# Patient Record
Sex: FEMALE | Race: WHITE | NOT HISPANIC OR LATINO | Employment: OTHER | URBAN - METROPOLITAN AREA
[De-identification: names, ages, dates, MRNs, and addresses within clinical notes are randomized per-mention and may not be internally consistent; named-entity substitution may affect disease eponyms.]

---

## 2017-01-24 ENCOUNTER — OFFICE VISIT (OUTPATIENT)
Dept: CARDIOLOGY | Facility: CLINIC | Age: 82
End: 2017-01-24

## 2017-01-24 VITALS
DIASTOLIC BLOOD PRESSURE: 72 MMHG | WEIGHT: 130 LBS | HEIGHT: 64 IN | HEART RATE: 75 BPM | SYSTOLIC BLOOD PRESSURE: 127 MMHG | BODY MASS INDEX: 22.2 KG/M2

## 2017-01-24 DIAGNOSIS — E78.2 MIXED HYPERLIPIDEMIA: ICD-10-CM

## 2017-01-24 DIAGNOSIS — I42.9 IDIOPATHIC CARDIOMYOPATHY (HCC): Primary | ICD-10-CM

## 2017-01-24 DIAGNOSIS — I10 ESSENTIAL HYPERTENSION: ICD-10-CM

## 2017-01-24 PROCEDURE — 99212 OFFICE O/P EST SF 10 MIN: CPT | Performed by: INTERNAL MEDICINE

## 2017-01-24 RX ORDER — ACETAMINOPHEN 325 MG/1
650 TABLET ORAL EVERY 6 HOURS PRN
COMMUNITY

## 2017-01-24 RX ORDER — POTASSIUM CHLORIDE 20 MEQ/1
20 TABLET, EXTENDED RELEASE ORAL DAILY
COMMUNITY
End: 2019-01-01 | Stop reason: HOSPADM

## 2017-01-24 RX ORDER — CARVEDILOL 12.5 MG/1
12.5 TABLET ORAL 2 TIMES DAILY WITH MEALS
COMMUNITY

## 2017-01-24 RX ORDER — BISACODYL 10 MG
10 SUPPOSITORY, RECTAL RECTAL DAILY PRN
COMMUNITY

## 2017-01-24 RX ORDER — DONEPEZIL HYDROCHLORIDE 5 MG/1
5 TABLET, FILM COATED ORAL NIGHTLY
COMMUNITY

## 2017-01-24 RX ORDER — FERROUS SULFATE 325(65) MG
325 TABLET ORAL
COMMUNITY

## 2017-01-24 RX ORDER — MIRTAZAPINE 15 MG/1
7.5 TABLET, FILM COATED ORAL NIGHTLY
Status: ON HOLD | COMMUNITY
End: 2019-01-01

## 2017-01-24 RX ORDER — LEVOTHYROXINE SODIUM 0.1 MG/1
50 TABLET ORAL DAILY
Status: ON HOLD | COMMUNITY
End: 2019-01-01

## 2017-01-24 RX ORDER — FUROSEMIDE 40 MG/1
40 TABLET ORAL DAILY
Status: ON HOLD | COMMUNITY
End: 2019-01-01 | Stop reason: SDUPTHER

## 2017-01-24 RX ORDER — FAMOTIDINE 20 MG/1
20 TABLET, FILM COATED ORAL 2 TIMES DAILY
Status: ON HOLD | COMMUNITY
End: 2018-10-01 | Stop reason: ALTCHOICE

## 2017-01-24 RX ORDER — TRAMADOL HYDROCHLORIDE 50 MG/1
50 TABLET ORAL NIGHTLY
COMMUNITY
End: 2019-01-01 | Stop reason: HOSPADM

## 2017-01-24 RX ORDER — ASCORBIC ACID 500 MG
500 TABLET ORAL DAILY
Status: ON HOLD | COMMUNITY
End: 2018-10-01 | Stop reason: ALTCHOICE

## 2017-01-24 RX ORDER — FOLIC ACID 1 MG/1
1 TABLET ORAL DAILY
COMMUNITY

## 2017-01-24 RX ORDER — SERTRALINE HYDROCHLORIDE 25 MG/1
50 TABLET, FILM COATED ORAL DAILY
Status: ON HOLD | COMMUNITY
End: 2019-01-01

## 2017-01-24 RX ORDER — BENZONATATE 100 MG/1
100 CAPSULE ORAL 3 TIMES DAILY PRN
Status: ON HOLD | COMMUNITY
End: 2018-10-01 | Stop reason: ALTCHOICE

## 2017-01-24 RX ORDER — LISINOPRIL 2.5 MG/1
2.5 TABLET ORAL DAILY
COMMUNITY

## 2017-01-24 RX ORDER — VITAMIN E 268 MG
400 CAPSULE ORAL DAILY
Status: ON HOLD | COMMUNITY
End: 2018-10-01 | Stop reason: ALTCHOICE

## 2017-01-24 RX ORDER — ACETAMINOPHEN 500 MG
500 TABLET ORAL EVERY 6 HOURS PRN
COMMUNITY
End: 2019-01-01 | Stop reason: HOSPADM

## 2017-01-24 NOTE — PROGRESS NOTES
OFFICE FOLLOW UP     Date of Encounter:2017     Name: Imelda Maldonado  : 1923  Address: [address] [place] [address]  Phone: [phone]    PCP: Leyla Castro, DO  [address] [facility] [address]  [facility] [address]    Imelda Maldonado is a 89 y.o. female.      Chief Complaint: cardiomyopathy, dyslipidemia, and hypertension.  PROBLEM LIST:  1. 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   a. Left bundle branch block.  b. History of nonsustained ventricular ectopy.  c. “Normal” coronary arteries by catheterization, 2003, echo ejection fraction of 30%, 2003.  d. Echocardiogram, 2004:  Global hypokinesis, ejection fraction 25%, moderate mitral regurgitation.  e. [employer] BI-V pacemaker implantation by Dr. Naidu on 2010 also with a coronary sinus lead placement.    f. Patient refuses upgrade to an ICD and Coumadin, 2010.    g. Atrial fibrillation with “failed” cardioversion (early recurrence), 2010.  h. Biventricular pacemaker generator change, 10/30/2015.    2. Acute gastrointestinal bleed with hospitalization, 2013 - 2013.  a. Status post 7 units of packed red blood cells.  b. Esophagogastroduodenoscopy (no active bleed) and colonoscopy (active bleeding from flat cecal polyps and two ascending colonic polyps) suggestive of non-steroidal anti-inflammatory drug colopathy or mild ischemia.    c. Positive for Clostridium difficile status post 14 days of antibiotics.  d. Discontinuation of aspirin, no anticoagulation.    3. Arthritis.  a. Reinitiation of Celebrex, 2013.  4. Dyslipidemia.  5. Gout.  6. History of histoplasmosis, OS.  7. History of remote DVT, RLE.  8. Status post thyroidectomy, remote.  a. Chronic replacement therapy.  9. History of intermittent mild CRI.  10. Hypertension.  11. Surgical history.  a. Appendectomy.  b. Thyroidectomy.  c. Hysterectomy.  d. Polypectomy.  12. Probable  "cognitive “issues.”      No Known Allergies      Current Outpatient Prescriptions:   •  acetaminophen  325 MG tablet, by mouth Every 6 Hours As Needed   •  acetaminophen  500 MG tablet, by mouth Every 6 Hours As Needed    •  benzonatate 100 MG capsule, by mouth 3  Times a Day As Needed   •  bisacodyl 10 MG suppository,  into the rectum Daily As Needed for   •  carvedilol 12.5 MG tablet, by mouth 2 Times a Day With Meals.  •  Diclofenac 1 % gel gel, Apply topically As Needed.  •  donepezil  5 MG tablet, by mouth Every Night.  •  famotidine  20 MG tablet,  by mouth 2 Times a Day.  •  ferrous sulfate 325  MG tablet, by mouth Daily With Breakfast.  •  folic acid  1 MG tablet, by mouth Daily.  •  furosemide 40 MG tablet,  by mouth Daily.  •  levothyroxine 100 MCG tablet,  by mouth Daily.  •  lisinopril 2.5 MG tablet,  by mouth Daily.  •  Magnesium Hydroxide,  by mouth Daily As Needed.  •  mirtazapine 15 MG tablet, by mouth Every Night.  •  potassium chloride 20 MEQ CR tablet, by mouth Daily.  •  sertraline 25 MG tablet, by mouth Daily.  •  similac liquid protein concentration,  by mouth Daily.  •  tramadol 50 MG tablet, by mouth Every Night.   •  vitamin C 500 MG tablet, by mouth Daily.  •  vitamin E 400 UNIT capsule, by mouth Daily.    History of Present Illness:         Mrs. Maldonado is a 89 year old female presenting today for a follow up. Since her last visit she has been doing well from a cardiac standpoint. The patient is very sedentary. She also has arthritis. She denies any chest pain, palpitations, or shortness of breath.        The following portions of the patient's history were reviewed and updated as appropriate: allergies, current medications and problem list.    HPI: Pertinent positives as listed in the HPI.  All other systems reviewed and negative.      Objective:    Vitals:    01/24/17 1429   BP: 127/72   BP Location: Left arm   Patient Position: Sitting   Pulse: 75   Weight: 130 lb (59 kg)   Height: 64\" " (162.6 cm)       Physical Exam   Constitutional: She is oriented to person, place, and time.   Neck: No JVD present. No thyromegaly present.   Cardiovascular: Intact distal pulses.    Exam reveals no gallop, murmur, or rub.   Pulmonary/Chest:   No wheezes, crackles, or rhonchi.   Musculoskeletal:   No peripheral edema, no clubbing, or cyanosis    Neurological: She is alert and oriented to person, place, and time.   No focal abnormalities in sensation or strength    Skin: Skin is dry.   Vitals reviewed.         Diagnostic Data:    Device Interrogation 01/24/2017  50 NSVT (max= 11 beats)     Procedures      Assessment and Plan:  1. Patient is doing well from a cardiac standpoint. We will not change anything at this time.  2. Continue with current medication therapy and recommendations.  3. Patient should follow up with me as needed; interim device clinic with EP.        Scribed for Les Cardozo MD by Diandra Mijares. 1/24/2017  3:02 PM       ILes MD, personally performed the services described in this documentation as scribed by the above named individual in my presence, and it is both accurate and complete.  1/24/2017  3:52 PM

## 2018-10-01 ENCOUNTER — HOSPITAL ENCOUNTER (INPATIENT)
Facility: HOSPITAL | Age: 83
LOS: 1 days | Discharge: NURSING, REHAB OR LONG-TERM CARE | End: 2018-10-02
Attending: EMERGENCY MEDICINE | Admitting: INTERNAL MEDICINE

## 2018-10-01 ENCOUNTER — APPOINTMENT (OUTPATIENT)
Dept: CARDIOLOGY | Facility: HOSPITAL | Age: 83
End: 2018-10-01
Attending: HOSPITALIST

## 2018-10-01 ENCOUNTER — APPOINTMENT (OUTPATIENT)
Dept: GENERAL RADIOLOGY | Facility: HOSPITAL | Age: 83
End: 2018-10-01

## 2018-10-01 DIAGNOSIS — I50.9 ACUTE ON CHRONIC CONGESTIVE HEART FAILURE, UNSPECIFIED HEART FAILURE TYPE (HCC): ICD-10-CM

## 2018-10-01 DIAGNOSIS — Z66 DNR NO CODE (DO NOT RESUSCITATE): ICD-10-CM

## 2018-10-01 DIAGNOSIS — R79.89 ELEVATED LACTIC ACID LEVEL: ICD-10-CM

## 2018-10-01 DIAGNOSIS — Z87.19 HISTORY OF GI BLEED: ICD-10-CM

## 2018-10-01 DIAGNOSIS — Z86.79 HISTORY OF HYPERTENSION: ICD-10-CM

## 2018-10-01 DIAGNOSIS — F03.90 DEMENTIA WITHOUT BEHAVIORAL DISTURBANCE, UNSPECIFIED DEMENTIA TYPE: ICD-10-CM

## 2018-10-01 DIAGNOSIS — E03.9 HYPOTHYROIDISM, UNSPECIFIED TYPE: ICD-10-CM

## 2018-10-01 DIAGNOSIS — J96.01 ACUTE RESPIRATORY FAILURE WITH HYPOXIA (HCC): Primary | ICD-10-CM

## 2018-10-01 PROBLEM — E87.20 LACTIC ACIDOSIS: Status: ACTIVE | Noted: 2018-10-01

## 2018-10-01 PROBLEM — I50.23 ACUTE ON CHRONIC SYSTOLIC (CONGESTIVE) HEART FAILURE (HCC): Status: ACTIVE | Noted: 2018-10-01

## 2018-10-01 PROBLEM — N18.30 CKD (CHRONIC KIDNEY DISEASE) STAGE 3, GFR 30-59 ML/MIN (HCC): Status: ACTIVE | Noted: 2018-10-01

## 2018-10-01 LAB
ANION GAP SERPL CALCULATED.3IONS-SCNC: 15 MMOL/L (ref 3–11)
APTT PPP: 24.9 SECONDS (ref 24–31)
ARTERIAL PATENCY WRIST A: ABNORMAL
ATMOSPHERIC PRESS: ABNORMAL MMHG
BACTERIA UR QL AUTO: ABNORMAL /HPF
BASE EXCESS BLDA CALC-SCNC: -0.4 MMOL/L (ref 0–2)
BASOPHILS # BLD AUTO: 0.02 10*3/MM3 (ref 0–0.2)
BASOPHILS NFR BLD AUTO: 0.2 % (ref 0–1)
BDY SITE: ABNORMAL
BILIRUB UR QL STRIP: NEGATIVE
BNP SERPL-MCNC: 859 PG/ML (ref 0–100)
BUN BLD-MCNC: 20 MG/DL (ref 9–23)
BUN/CREAT SERPL: 13.1 (ref 7–25)
CALCIUM SPEC-SCNC: 7.3 MG/DL (ref 8.7–10.4)
CHLORIDE SERPL-SCNC: 107 MMOL/L (ref 99–109)
CLARITY UR: CLEAR
CO2 BLDA-SCNC: 25.7 MMOL/L (ref 22–33)
CO2 SERPL-SCNC: 18 MMOL/L (ref 20–31)
COHGB MFR BLD: 1.4 % (ref 0–2)
COLOR UR: YELLOW
CREAT BLD-MCNC: 1.53 MG/DL (ref 0.6–1.3)
D-LACTATE SERPL-SCNC: 1.4 MMOL/L (ref 0.5–2)
D-LACTATE SERPL-SCNC: 7.3 MMOL/L (ref 0.5–2)
DEPRECATED RDW RBC AUTO: 57.2 FL (ref 37–54)
EOSINOPHIL # BLD AUTO: 0.15 10*3/MM3 (ref 0–0.3)
EOSINOPHIL NFR BLD AUTO: 1.4 % (ref 0–3)
ERYTHROCYTE [DISTWIDTH] IN BLOOD BY AUTOMATED COUNT: 15.3 % (ref 11.3–14.5)
GFR SERPL CREATININE-BSD FRML MDRD: 32 ML/MIN/1.73
GLUCOSE BLD-MCNC: 300 MG/DL (ref 70–100)
GLUCOSE UR STRIP-MCNC: NEGATIVE MG/DL
HCO3 BLDA-SCNC: 24.4 MMOL/L (ref 20–26)
HCT VFR BLD AUTO: 36.2 % (ref 34.5–44)
HCT VFR BLD CALC: 32.9 %
HGB BLD-MCNC: 11 G/DL (ref 11.5–15.5)
HGB BLDA-MCNC: 10.7 G/DL (ref 14–18)
HGB UR QL STRIP.AUTO: NEGATIVE
HOLD SPECIMEN: NORMAL
HOROWITZ INDEX BLD+IHG-RTO: 35 %
HYALINE CASTS UR QL AUTO: ABNORMAL /LPF
IMM GRANULOCYTES # BLD: 0.16 10*3/MM3 (ref 0–0.03)
IMM GRANULOCYTES NFR BLD: 1.5 % (ref 0–0.6)
INR PPP: 1.14 (ref 0.91–1.09)
KETONES UR QL STRIP: NEGATIVE
LEUKOCYTE ESTERASE UR QL STRIP.AUTO: ABNORMAL
LYMPHOCYTES # BLD AUTO: 3.38 10*3/MM3 (ref 0.6–4.8)
LYMPHOCYTES NFR BLD AUTO: 30.7 % (ref 24–44)
MCH RBC QN AUTO: 31.4 PG (ref 27–31)
MCHC RBC AUTO-ENTMCNC: 30.4 G/DL (ref 32–36)
MCV RBC AUTO: 103.4 FL (ref 80–99)
METHGB BLD QL: 0.9 % (ref 0–1.5)
MODALITY: ABNORMAL
MONOCYTES # BLD AUTO: 0.51 10*3/MM3 (ref 0–1)
MONOCYTES NFR BLD AUTO: 4.6 % (ref 0–12)
NEUTROPHILS # BLD AUTO: 6.94 10*3/MM3 (ref 1.5–8.3)
NEUTROPHILS NFR BLD AUTO: 63.1 % (ref 41–71)
NITRITE UR QL STRIP: NEGATIVE
OXYHGB MFR BLDV: 95 % (ref 94–99)
PCO2 BLDA: 40.1 MM HG (ref 35–45)
PH BLDA: 7.39 PH UNITS (ref 7.35–7.45)
PH UR STRIP.AUTO: 6 [PH] (ref 5–8)
PLATELET # BLD AUTO: 246 10*3/MM3 (ref 150–450)
PMV BLD AUTO: 9.8 FL (ref 6–12)
PO2 BLDA: 89.7 MM HG (ref 83–108)
POTASSIUM BLD-SCNC: 4.6 MMOL/L (ref 3.5–5.5)
PROCALCITONIN SERPL-MCNC: 0.16 NG/ML
PROT UR QL STRIP: ABNORMAL
PROTHROMBIN TIME: 12 SECONDS (ref 9.6–11.5)
RBC # BLD AUTO: 3.5 10*6/MM3 (ref 3.89–5.14)
RBC # UR: ABNORMAL /HPF
REF LAB TEST METHOD: ABNORMAL
SODIUM BLD-SCNC: 140 MMOL/L (ref 132–146)
SP GR UR STRIP: 1.01 (ref 1–1.03)
SQUAMOUS #/AREA URNS HPF: ABNORMAL /HPF
TROPONIN I SERPL-MCNC: 0.03 NG/ML (ref 0–0.07)
UROBILINOGEN UR QL STRIP: ABNORMAL
WBC NRBC COR # BLD: 11 10*3/MM3 (ref 3.5–10.8)
WBC UR QL AUTO: ABNORMAL /HPF

## 2018-10-01 PROCEDURE — 93306 TTE W/DOPPLER COMPLETE: CPT

## 2018-10-01 PROCEDURE — 82805 BLOOD GASES W/O2 SATURATION: CPT | Performed by: PHYSICIAN ASSISTANT

## 2018-10-01 PROCEDURE — P9612 CATHETERIZE FOR URINE SPEC: HCPCS

## 2018-10-01 PROCEDURE — 93005 ELECTROCARDIOGRAM TRACING: CPT | Performed by: EMERGENCY MEDICINE

## 2018-10-01 PROCEDURE — 84145 PROCALCITONIN (PCT): CPT | Performed by: EMERGENCY MEDICINE

## 2018-10-01 PROCEDURE — 85025 COMPLETE CBC W/AUTO DIFF WBC: CPT | Performed by: EMERGENCY MEDICINE

## 2018-10-01 PROCEDURE — 25010000002 HEPARIN (PORCINE) PER 1000 UNITS: Performed by: HOSPITALIST

## 2018-10-01 PROCEDURE — 93005 ELECTROCARDIOGRAM TRACING: CPT

## 2018-10-01 PROCEDURE — 83605 ASSAY OF LACTIC ACID: CPT | Performed by: EMERGENCY MEDICINE

## 2018-10-01 PROCEDURE — 25010000002 PIPERACILLIN SOD-TAZOBACTAM PER 1 G: Performed by: PHYSICIAN ASSISTANT

## 2018-10-01 PROCEDURE — 25010000002 FUROSEMIDE PER 20 MG: Performed by: HOSPITALIST

## 2018-10-01 PROCEDURE — 87040 BLOOD CULTURE FOR BACTERIA: CPT | Performed by: EMERGENCY MEDICINE

## 2018-10-01 PROCEDURE — 99223 1ST HOSP IP/OBS HIGH 75: CPT | Performed by: HOSPITALIST

## 2018-10-01 PROCEDURE — 81001 URINALYSIS AUTO W/SCOPE: CPT | Performed by: EMERGENCY MEDICINE

## 2018-10-01 PROCEDURE — 25010000002 VANCOMYCIN 10 G RECONSTITUTED SOLUTION: Performed by: PHYSICIAN ASSISTANT

## 2018-10-01 PROCEDURE — 94660 CPAP INITIATION&MGMT: CPT

## 2018-10-01 PROCEDURE — 36600 WITHDRAWAL OF ARTERIAL BLOOD: CPT | Performed by: PHYSICIAN ASSISTANT

## 2018-10-01 PROCEDURE — 85610 PROTHROMBIN TIME: CPT | Performed by: EMERGENCY MEDICINE

## 2018-10-01 PROCEDURE — 80048 BASIC METABOLIC PNL TOTAL CA: CPT | Performed by: EMERGENCY MEDICINE

## 2018-10-01 PROCEDURE — 99291 CRITICAL CARE FIRST HOUR: CPT

## 2018-10-01 PROCEDURE — 71045 X-RAY EXAM CHEST 1 VIEW: CPT

## 2018-10-01 PROCEDURE — 94799 UNLISTED PULMONARY SVC/PX: CPT

## 2018-10-01 PROCEDURE — 84484 ASSAY OF TROPONIN QUANT: CPT

## 2018-10-01 PROCEDURE — 85730 THROMBOPLASTIN TIME PARTIAL: CPT | Performed by: EMERGENCY MEDICINE

## 2018-10-01 PROCEDURE — 83880 ASSAY OF NATRIURETIC PEPTIDE: CPT | Performed by: EMERGENCY MEDICINE

## 2018-10-01 PROCEDURE — 93306 TTE W/DOPPLER COMPLETE: CPT | Performed by: INTERNAL MEDICINE

## 2018-10-01 PROCEDURE — 5A09357 ASSISTANCE WITH RESPIRATORY VENTILATION, LESS THAN 24 CONSECUTIVE HOURS, CONTINUOUS POSITIVE AIRWAY PRESSURE: ICD-10-PCS | Performed by: EMERGENCY MEDICINE

## 2018-10-01 RX ORDER — LEVOTHYROXINE SODIUM 0.1 MG/1
100 TABLET ORAL DAILY
Status: DISCONTINUED | OUTPATIENT
Start: 2018-10-01 | End: 2018-10-01

## 2018-10-01 RX ORDER — SODIUM CHLORIDE 0.9 % (FLUSH) 0.9 %
1-10 SYRINGE (ML) INJECTION AS NEEDED
Status: DISCONTINUED | OUTPATIENT
Start: 2018-10-01 | End: 2018-10-02 | Stop reason: HOSPADM

## 2018-10-01 RX ORDER — DONEPEZIL HYDROCHLORIDE 5 MG/1
5 TABLET, FILM COATED ORAL NIGHTLY
Status: DISCONTINUED | OUTPATIENT
Start: 2018-10-01 | End: 2018-10-02 | Stop reason: HOSPADM

## 2018-10-01 RX ORDER — SODIUM CHLORIDE 0.9 % (FLUSH) 0.9 %
10 SYRINGE (ML) INJECTION AS NEEDED
Status: DISCONTINUED | OUTPATIENT
Start: 2018-10-01 | End: 2018-10-02 | Stop reason: HOSPADM

## 2018-10-01 RX ORDER — TRAMADOL HYDROCHLORIDE 50 MG/1
50 TABLET ORAL NIGHTLY
Status: DISCONTINUED | OUTPATIENT
Start: 2018-10-01 | End: 2018-10-02 | Stop reason: HOSPADM

## 2018-10-01 RX ORDER — SERTRALINE HYDROCHLORIDE 25 MG/1
25 TABLET, FILM COATED ORAL DAILY
Status: DISCONTINUED | OUTPATIENT
Start: 2018-10-01 | End: 2018-10-02 | Stop reason: HOSPADM

## 2018-10-01 RX ORDER — MIRTAZAPINE 15 MG/1
15 TABLET, FILM COATED ORAL NIGHTLY
Status: DISCONTINUED | OUTPATIENT
Start: 2018-10-01 | End: 2018-10-02 | Stop reason: HOSPADM

## 2018-10-01 RX ORDER — FAMOTIDINE 20 MG/1
20 TABLET, FILM COATED ORAL 2 TIMES DAILY
Status: DISCONTINUED | OUTPATIENT
Start: 2018-10-01 | End: 2018-10-01

## 2018-10-01 RX ORDER — FUROSEMIDE 10 MG/ML
40 INJECTION INTRAMUSCULAR; INTRAVENOUS ONCE
Status: COMPLETED | OUTPATIENT
Start: 2018-10-01 | End: 2018-10-01

## 2018-10-01 RX ORDER — CARVEDILOL 12.5 MG/1
12.5 TABLET ORAL 2 TIMES DAILY WITH MEALS
Status: DISCONTINUED | OUTPATIENT
Start: 2018-10-01 | End: 2018-10-02 | Stop reason: HOSPADM

## 2018-10-01 RX ORDER — FAMOTIDINE 20 MG/1
20 TABLET, FILM COATED ORAL DAILY
Status: DISCONTINUED | OUTPATIENT
Start: 2018-10-01 | End: 2018-10-02 | Stop reason: HOSPADM

## 2018-10-01 RX ORDER — LEVOTHYROXINE SODIUM 0.05 MG/1
50 TABLET ORAL DAILY
Status: DISCONTINUED | OUTPATIENT
Start: 2018-10-01 | End: 2018-10-02 | Stop reason: HOSPADM

## 2018-10-01 RX ORDER — ONDANSETRON 2 MG/ML
4 INJECTION INTRAMUSCULAR; INTRAVENOUS EVERY 6 HOURS PRN
Status: DISCONTINUED | OUTPATIENT
Start: 2018-10-01 | End: 2018-10-02 | Stop reason: HOSPADM

## 2018-10-01 RX ORDER — QUETIAPINE FUMARATE 25 MG/1
12.5 TABLET, FILM COATED ORAL ONCE
Status: COMPLETED | OUTPATIENT
Start: 2018-10-01 | End: 2018-10-01

## 2018-10-01 RX ORDER — ACETAMINOPHEN 325 MG/1
650 TABLET ORAL EVERY 4 HOURS PRN
Status: DISCONTINUED | OUTPATIENT
Start: 2018-10-01 | End: 2018-10-02 | Stop reason: HOSPADM

## 2018-10-01 RX ORDER — HEPARIN SODIUM 5000 [USP'U]/ML
5000 INJECTION, SOLUTION INTRAVENOUS; SUBCUTANEOUS EVERY 12 HOURS SCHEDULED
Status: DISCONTINUED | OUTPATIENT
Start: 2018-10-01 | End: 2018-10-02 | Stop reason: HOSPADM

## 2018-10-01 RX ORDER — FERROUS SULFATE 325(65) MG
325 TABLET ORAL
Status: DISCONTINUED | OUTPATIENT
Start: 2018-10-01 | End: 2018-10-02 | Stop reason: HOSPADM

## 2018-10-01 RX ORDER — ONDANSETRON 4 MG/1
4 TABLET, FILM COATED ORAL EVERY 6 HOURS PRN
Status: DISCONTINUED | OUTPATIENT
Start: 2018-10-01 | End: 2018-10-02 | Stop reason: HOSPADM

## 2018-10-01 RX ORDER — FOLIC ACID 1 MG/1
1 TABLET ORAL DAILY
Status: DISCONTINUED | OUTPATIENT
Start: 2018-10-01 | End: 2018-10-02 | Stop reason: HOSPADM

## 2018-10-01 RX ORDER — VITAMIN E 268 MG
400 CAPSULE ORAL DAILY
Status: DISCONTINUED | OUTPATIENT
Start: 2018-10-01 | End: 2018-10-02 | Stop reason: HOSPADM

## 2018-10-01 RX ORDER — ASCORBIC ACID 500 MG
500 TABLET ORAL DAILY
Status: DISCONTINUED | OUTPATIENT
Start: 2018-10-01 | End: 2018-10-02 | Stop reason: HOSPADM

## 2018-10-01 RX ADMIN — LEVOTHYROXINE SODIUM 50 MCG: 50 TABLET ORAL at 05:55

## 2018-10-01 RX ADMIN — ACETAMINOPHEN 650 MG: 325 TABLET ORAL at 18:23

## 2018-10-01 RX ADMIN — TRAMADOL HYDROCHLORIDE 50 MG: 50 TABLET, FILM COATED ORAL at 20:08

## 2018-10-01 RX ADMIN — VANCOMYCIN HYDROCHLORIDE 1250 MG: 10 INJECTION, POWDER, LYOPHILIZED, FOR SOLUTION INTRAVENOUS at 03:24

## 2018-10-01 RX ADMIN — QUETIAPINE FUMARATE 12.5 MG: 25 TABLET ORAL at 20:09

## 2018-10-01 RX ADMIN — DONEPEZIL HYDROCHLORIDE 5 MG: 5 TABLET ORAL at 20:08

## 2018-10-01 RX ADMIN — VITAMIN E CAP 400 UNIT 400 UNITS: 400 CAP at 09:34

## 2018-10-01 RX ADMIN — CARVEDILOL 12.5 MG: 12.5 TABLET, FILM COATED ORAL at 09:35

## 2018-10-01 RX ADMIN — TAZOBACTAM SODIUM AND PIPERACILLIN SODIUM 3.38 G: 375; 3 INJECTION, SOLUTION INTRAVENOUS at 02:45

## 2018-10-01 RX ADMIN — FOLIC ACID 1 MG: 1 TABLET ORAL at 09:35

## 2018-10-01 RX ADMIN — HEPARIN SODIUM 5000 UNITS: 5000 INJECTION, SOLUTION INTRAVENOUS; SUBCUTANEOUS at 09:34

## 2018-10-01 RX ADMIN — CARVEDILOL 12.5 MG: 12.5 TABLET, FILM COATED ORAL at 17:26

## 2018-10-01 RX ADMIN — MIRTAZAPINE 15 MG: 15 TABLET, FILM COATED ORAL at 20:09

## 2018-10-01 RX ADMIN — FUROSEMIDE 40 MG: 10 INJECTION, SOLUTION INTRAMUSCULAR; INTRAVENOUS at 09:34

## 2018-10-01 RX ADMIN — FAMOTIDINE 20 MG: 20 TABLET, FILM COATED ORAL at 09:34

## 2018-10-01 RX ADMIN — OXYCODONE HYDROCHLORIDE AND ACETAMINOPHEN 500 MG: 500 TABLET ORAL at 09:35

## 2018-10-01 RX ADMIN — HEPARIN SODIUM 5000 UNITS: 5000 INJECTION, SOLUTION INTRAVENOUS; SUBCUTANEOUS at 20:08

## 2018-10-01 RX ADMIN — Medication 325 MG: at 09:35

## 2018-10-01 RX ADMIN — SERTRALINE HYDROCHLORIDE 25 MG: 25 TABLET ORAL at 09:35

## 2018-10-01 NOTE — ED PROVIDER NOTES
Subjective   History is limited by respiratory distress.  EMS reports they were called out for shortness of breath that began approx 20 min before they were called.  Pt was reportedly given NTG without improvement and then EMS was called. They found her with RA sats in the 60s.  They initiated CPAP, gave a neb, as well as NTG and Lasix.  Pt had some improvement but only to the 80s.    Pt is unable to give me history secondary to resp distress.  DNR/DNI forms are present in her paperwork.  Records indicate a history of CHF, CKD, HTN, DVT.        History provided by:  EMS personnel  History limited by:  Acuity of condition and severe respiratory distress   used: No        Review of Systems   Unable to perform ROS: Severe respiratory distress       Past Medical History:   Diagnosis Date   • Acute GI bleeding     12/09/2013 - 12/24/2013. Status post 7 units of packed red blood cells. Esophagogastroduodenoscopy (no active bleed) and colonoscopy (active bleeding from flat cecal polyps and two ascending colonic polyps) suggestive of non-steroidal anti-inflammatory drug colopathy or mild ischemia. Positive for Clostridium difficile status post 14 days of antibiotics.     • Arthritis     reinitiation of celebrex, October 2013   • CRI (chronic renal insufficiency)     mild   • DVT (deep venous thrombosis) (CMS/HCC)     RLE   • Gout    • Histoplasmosis    • Hyperlipidemia    • Hypertension    • Hypotension     symptoms of dizziness   • Idiopathic cardiomyopathy (CMS/HCC)        No Known Allergies    Past Surgical History:   Procedure Laterality Date   • APPENDECTOMY     • HYSTERECTOMY     • PACEMAKER IMPLANTATION      change out on 10/30/15 without complication   • POLYPECTOMY     • THYROIDECTOMY      chronic replacement therapy       Family History   Problem Relation Age of Onset   • Arthritis Other    • Diabetes Other    • Heart disease Other    • Hypertension Other    • Thyroid disease Other         Social History     Social History   • Marital status:      Social History Main Topics   • Smoking status: Former Smoker     Types: Cigarettes   • Smokeless tobacco: Never Used   • Drug use: No   • Sexual activity: Defer     Other Topics Concern   • Not on file           Objective   Physical Exam   Constitutional: She appears well-developed and well-nourished.   HENT:   Head: Normocephalic and atraumatic.   Eyes: Conjunctivae are normal. No scleral icterus.   Neck: Neck supple.   Cardiovascular: Normal rate, regular rhythm and normal heart sounds.    Pulmonary/Chest: She is in respiratory distress. She has rales (coarse bilateral).   Abdominal: Soft. She exhibits no distension. There is no tenderness.   Musculoskeletal: She exhibits no edema.   Neurological: She exhibits normal muscle tone.   Moves all fours, no gross deficits.  No facial droop.  Speaks but mostly to complain about being cold.   Skin: Skin is warm and dry. Capillary refill takes less than 2 seconds. She is not diaphoretic.   Nursing note and vitals reviewed.      Critical Care  Performed by: JHONATHAN SKAGGS  Authorized by: JHONATHAN SKAGGS     Critical care provider statement:     Critical care time (minutes):  35    Critical care time was exclusive of:  Separately billable procedures and treating other patients    Critical care was necessary to treat or prevent imminent or life-threatening deterioration of the following conditions:  Cardiac failure and respiratory failure    Critical care was time spent personally by me on the following activities:  Discussions with consultants, examination of patient, evaluation of patient's response to treatment, obtaining history from patient or surrogate, ordering and performing treatments and interventions, ordering and review of laboratory studies, ordering and review of radiographic studies, pulse oximetry, re-evaluation of patient's condition, review of old charts and ventilator  management    I assumed direction of critical care for this patient from another provider in my specialty: no                 ED Course  ED Course as of Oct 01 0242   Mon Oct 01, 2018   0226 Echo from 2/14 showed EF of 25-30%.  Pt is on Lasix 40 mg daily.  BNP is elevated at 859.  CXR shows CMG with mild to moderate CHF.  She was given Lasix en route per EMS.  I also ordered ABG for further evaluation.  [FC]   0239 UA is negative for acute infectious process.  Paged Dr. Vega, hospitalist to discuss admission.  She is agreeable to admission on telemetry for acute hypoxic respiratory failure and CHF exacerbation.  [FC]      ED Course User Index  [FC] Ivy Robles, VANDANA      Pt in resp distress.  BIPAP initiated urgently, sats improved to 90s and pt reports dyspnea improved.  EKG paced rhythm.    Reviewed old records, history of stage III CHF in addition to CKD.              MDM  Number of Diagnoses or Management Options     Amount and/or Complexity of Data Reviewed  Clinical lab tests: ordered and reviewed  Tests in the radiology section of CPT®: ordered and reviewed  Decide to obtain previous medical records or to obtain history from someone other than the patient: yes  Review and summarize past medical records: yes  Independent visualization of images, tracings, or specimens: yes    Critical Care  Total time providing critical care: 30-74 minutes        Final diagnoses:   Acute respiratory failure with hypoxia (CMS/HCC)   Acute on chronic congestive heart failure, unspecified heart failure type (CMS/HCC)   Elevated lactic acid level   History of hypertension   Hypothyroidism, unspecified type   Dementia without behavioral disturbance, unspecified dementia type   History of GI bleed   DNR no code (do not resuscitate)            Daniele Estrada MD  10/02/18 8214

## 2018-10-01 NOTE — PROGRESS NOTES
Patient seen and examined this morning during rounds.  H&P has been reviewed.  She was admitted overnight with decompensated heart failure after presenting with respiratory distress and hypoxia.  Briefly she is a 89-year-old female with history of chronic systolic heart failure (last EF 25-30%), BiV-ICD, CAD, CKD 3, Afib, DVT, GI bleed, and dementia .This morning she states that she is doing well, pleasantly confused, breathing well    --Acute decompensated systolic heart failure, follow-up echo, continue volume unloading with Lasix as renal function allows.  -- Agree with holding off on abx  --Otherwise continue plan of care as per admission H&P.

## 2018-10-01 NOTE — PLAN OF CARE
Problem: Patient Care Overview  Goal: Plan of Care Review  Outcome: Ongoing (interventions implemented as appropriate)   10/01/18 0628   Coping/Psychosocial   Plan of Care Reviewed With patient   Plan of Care Review   Progress no change   OTHER   Outcome Summary VSS. Will continue to monitor.        Problem: Fall Risk (Adult)  Goal: Identify Related Risk Factors and Signs and Symptoms  Outcome: Ongoing (interventions implemented as appropriate)    Goal: Absence of Fall  Outcome: Ongoing (interventions implemented as appropriate)      Problem: Skin Injury Risk (Adult)  Goal: Identify Related Risk Factors and Signs and Symptoms  Outcome: Ongoing (interventions implemented as appropriate)    Goal: Skin Health and Integrity  Outcome: Ongoing (interventions implemented as appropriate)

## 2018-10-01 NOTE — PROGRESS NOTES
Clinical Nutrition     Nutrition Assessment  Reason for Visit:   Identified at risk by screening criteria, MST score 2+      Patient Name: Imelda Maldonado  YOB: 1923  MRN: [id number]  Date of Encounter: 10/01/18 3:09 PM  Admission date: 10/1/2018      Comments:   Based on reported weight and intake status as well as BMI, will defer pt to RD to follow up per protocol.     Nutrition Assessment   Assessment       Hospital Problem List  Principal Problem:    Acute respiratory failure with hypoxia (CMS/HCC)  Active Problems:    Acute on chronic systolic (congestive) heart failure (CMS/HCC)    Lactic acidosis    CKD (chronic kidney disease) stage 3, GFR 30-59 ml/min (CMS/HCC)      PMH: She  has a past medical history of Acute GI bleeding; Arthritis; CRI (chronic renal insufficiency); DVT (deep venous thrombosis) (CMS/HCC); Gout; Histoplasmosis; Hyperlipidemia; Hypertension; Hypotension; and Idiopathic cardiomyopathy (CMS/HCC).   PSxH: She  has a past surgical history that includes Thyroidectomy; Appendectomy; Hysterectomy; Polypectomy; and Pacemaker Implantation.     Reported/Observed/Food/Nutrition Related History:     Pt seemed confused during time of visit. Pt unsure UBW, thinks she used to weigh 130 lbs unsure when last weighed this amount, reported always trying to watch her weight and how much she consumes to stay attractive. Pt reported never being a big eater, reported appetite to be reduced currently because she is not as active in the hospital as she is when at home, reported moving a lot of furniture around and remodeling at home or playing with her grandchildren. Pt reported no issues with chewing or swallowing at this time, does have a couple missing top front teeth. Pt declined nutritional supplements at this time. No family at bedside during time of visit. Spoke with RN on phone, last weight charted was taken by night shift RN, per tech report this was a standing scale  "weight.    Anthropometrics     Height: 163 cm (64.17\")  Last filed wt: Weight: 43.5 kg (96 lb) (10/01/18 1508)  Weight Method: Standing scale weight per RN report    BMI: BMI (Calculated): 16.4  Underweight:<18.5kg/m2    Ideal Body Weight (IBW) (kg): 55.4    Weight Change   UBW: 130 lbs   Weight change: 34 lbs   % wt loss: 26%   Timeframe: unknown     Last 15 Recorded Weights   View Complete Flowsheet   Weight Weight (kg) Weight (lbs) Weight Method VISIT REPORT   10/1/2018 43.545 kg 96 lb Standing scale per RN report -   10/1/2018 43.772 kg 96 lb 8 oz Standing scale per RN report -   10/1/2018 58.968 kg 130 lb Estimated -   1/24/2017 58.968 kg 130 lb - Report   1/9/2013 63.19 kg 139 lb 4.9 oz -        Labs reviewed       Results from last 7 days  Lab Units 10/01/18  0037   GLUCOSE mg/dL 300*   BUN mg/dL 20   CREATININE mg/dL 1.53*   SODIUM mmol/L 140   CHLORIDE mmol/L 107   POTASSIUM mmol/L 4.6       Lab Results  Lab Value Date/Time   HGBA1C 5.4 10/29/2015 0415   HGBA1C 5.3 02/17/2014 0454       Current Nutrition Prescription     PO: Diet Regular; Cardiac  No active supplement orders      Intake:  Per charting 25% of 1 meal          Nutrition Diagnosis       Problem Predicted suboptimal energy intake   Etiology Clinical condition   Signs/Symptoms PO intake 25% of 1 meal charted, report of minimal intake by pt       Nutrition Intervention   1.  Follow treatment progress, Care plan reviewed, Advise alternate selection, Advised available snacks, Interview for preferences, Menu provided, Encourage intake, Supplement offered/refused        Goal:   General: Nutrition support treatment  PO: Establish PO  Additional goals:      Monitoring/Evaluation:   Per protocol, PO intake, Weight      Will Continue to follow per protocol      Emilia Corneliuss  Time Spent: 20 minutes      "

## 2018-10-01 NOTE — H&P
"    [facility] Medicine Services  HISTORY AND PHYSICAL    Patient Name: Imelda Maldonado  : 1923  MRN: [id number]  Primary Care Physician: Leyla Castro DO  Date of admission: 10/1/2018      Subjective   Subjective     Chief Complaint:  SOA, hypoxia    HPI:  Imelda Maldonado is a 89 y.o. female with hx of chronic systolic CHF (last EF 25-30%), BiV-ICD, CAD, CKD 3, Afib, DVT, GI bleed, and dementia presents from her SNF due to respiratory distress. Patient reportedly had acute onset shortness of air. EMS was called, O2 sats found to be in the 60s. She was given nitro, Lasix, and a neb en route. In the ER, she was placed on BiPAP with improvement. CXR showed signs of CHF. BNP elevated at 859. Lactic acid 7.2. WBC 11, negative procalcitonin. Negative troponin. She was given Vanc and Zosyn. Patient admitted to hospitalists. Currently patient has come off the BiPAP and on 3 liters nasal cannula. She is awake, alert, providing history best she can. \"My memory isn't so good at my age.\" She denies any SOA. Says she occasionally has chest pains but \"it's only when I'm doing something I'm not supposed to be doing.\" No current chest pain. Denies cough, fevers, chills. States she wears oxygen from time to time, \"when my doctor tells me to.\" Wanting to go home.    Review of Systems     Otherwise 10-system ROS reviewed and is negative except as mentioned in the HPI.    Personal History     Past Medical History:   Diagnosis Date   • Acute GI bleeding     2013 - 2013. Status post 7 units of packed red blood cells. Esophagogastroduodenoscopy (no active bleed) and colonoscopy (active bleeding from flat cecal polyps and two ascending colonic polyps) suggestive of non-steroidal anti-inflammatory drug colopathy or mild ischemia. Positive for Clostridium difficile status post 14 days of antibiotics.     • Arthritis     reinitiation of celebrex, 2013   • CRI (chronic renal " insufficiency)     mild   • DVT (deep venous thrombosis) (CMS/HCC)     RLE   • Gout    • Histoplasmosis    • Hyperlipidemia    • Hypertension    • Hypotension     symptoms of dizziness   • Idiopathic cardiomyopathy (CMS/HCC)        Past Surgical History:   Procedure Laterality Date   • APPENDECTOMY     • HYSTERECTOMY     • PACEMAKER IMPLANTATION      change out on 10/30/15 without complication   • POLYPECTOMY     • THYROIDECTOMY      chronic replacement therapy       Family History: family history includes Arthritis in her other; Diabetes in her other; Heart disease in her other; Hypertension in her other; Thyroid disease in her other.     Social History:  reports that she has quit smoking. Her smoking use included Cigarettes. She has never used smokeless tobacco. She reports that she does not use drugs.  Social History     Social History Narrative   • No narrative on file       Medications:  Available home medication information reviewed     No Known Allergies    Objective   Objective     Vital Signs:   Temp:  [97.6 °F (36.4 °C)-100.3 °F (37.9 °C)] 100.3 °F (37.9 °C)  Heart Rate:  [74-77] 75  Resp:  [35-40] 36  BP: (101-161)/(49-82) 137/62  FiO2 (%):  [28 %-35 %] 35 %        Physical Exam   Constitutional: No acute distress, awake, alert  Eyes: PERRLA, sclerae anicteric, no conjunctival injection  HENT: NCAT, mucous membranes moist  Neck: Supple, no thyromegaly, no lymphadenopathy, trachea midline  Respiratory: mild bibasilar rales, nonlabored respirations  Cardiovascular: RRR, no murmurs, rubs, or gallops, palpable pedal pulses bilaterally  Gastrointestinal: Positive bowel sounds, soft, nontender, nondistended  Musculoskeletal: No bilateral ankle edema, no clubbing or cyanosis to extremities  Psychiatric: Appropriate affect, cooperative  Neurologic: Oriented x 2, poor historian and forgetful, strength symmetric in all extremities, Cranial Nerves grossly intact to confrontation, speech clear  Skin: No  zoraida    Results Reviewed:  I have personally reviewed current lab, radiology, and data and agree.      Results from last 7 days  Lab Units 10/01/18  0037   WBC 10*3/mm3 11.00*   HEMOGLOBIN g/dL 11.0*   HEMATOCRIT % 36.2   PLATELETS 10*3/mm3 246   INR  1.14*       Results from last 7 days  Lab Units 10/01/18  0037   SODIUM mmol/L 140   POTASSIUM mmol/L 4.6   CHLORIDE mmol/L 107   CO2 mmol/L 18.0*   BUN mg/dL 20   CREATININE mg/dL 1.53*   GLUCOSE mg/dL 300*   CALCIUM mg/dL 7.3*     Estimated Creatinine Clearance: 20.5 mL/min (A) (by C-G formula based on SCr of 1.53 mg/dL (H)).  Brief Urine Lab Results  (Last result in the past 365 days)      Color   Clarity   Blood   Leuk Est   Nitrite   Protein   CREAT   Urine HCG        10/01/18 0159 Yellow Clear Negative Trace(A) Negative 30 mg/dL (1+)(A)             BNP   Date Value Ref Range Status   10/01/2018 859.0 (H) 0.0 - 100.0 pg/mL Final     Comment:     Results may be falsely decreased if patient taking Biotin.     Imaging Results (last 24 hours)     Procedure Component Value Units Date/Time    XR Chest 1 View [[id number]] Collected:  10/01/18 0028     Updated:  10/01/18 0142    Narrative:       EXAM:    XR Chest, 1 View    EXAM DATE/TIME:    10/1/2018 12:28 AM    CLINICAL HISTORY:    89 years old, female; Signs and symptoms; Dyspnea; SOB, chf.    TECHNIQUE:    Frontal view of the chest.    COMPARISON:    CR XR CHEST 1 VIEW PORTABLE 11/3/2015 5:46 AM    FINDINGS:    Again seen is a left chest wall pacemaker with moderate to severe enlargement   of the cardiac silhouette.  Diffuse aortic atherosclerosis without superior   mediastinal widening.      Diffuse pulmonary vascular prominence with interstitial thickening in both   lungs is new since the prior exam.  Possible trace left pleural effusion.  No   pneumothorax.      Impression:         Findings most consistent with mild to moderate CHF, new since the prior exam.    Enlargement of the cardiac silhouette.    THIS  DOCUMENT HAS BEEN ELECTRONICALLY SIGNED BY MARLINE HAMILTON MD            Assessment/Plan   Assessment / Plan     Hospital Problem List     * (Principal)Acute respiratory failure with hypoxia (CMS/HCC)    Acute on chronic systolic (congestive) heart failure (CMS/HCC)    Lactic acidosis    CKD (chronic kidney disease) stage 3, GFR 30-59 ml/min (CMS/[facility])            Assessment & Plan:  89 yo F with hx of chronic systolic CHF (last EF 25-30%), BiV-ICD, CAD, CKD 3, Afib, DVT, GI bleed, and dementia presents from her SNF due to respiratory distress and hypoxia. Admitted for decompensated heart failure.    PLAN:  --Stable off BiPAP. Continue O2 via NC and wean as tolerated. Nebs and incentive spirometry.  --Give another 40 mg IV Lasix in the morning and then reassess.  --Check Echo.  --I will hold off further ATBx at this time given lack of clinical signs and symptoms of pneumonia, even with her low grade temp. I think this is mostly heart failure. Low threshold to resume ATBx.  --Anticipate d/c back to SNF 1-2 days.    DVT prophylaxis: [facility]    CODE STATUS:    Code Status and Medical Interventions:   Ordered at: 10/01/18 0304     Limited Support to NOT Include:    Intubation    Cardioversion/Defibrillation     Code Status:    No CPR     Medical Interventions (Level of Support Prior to Arrest):    Limited       Admission Status:  I believe this patient meets INPATIENT status due to the need for care which can only be reasonably provided in an hospital setting such as aggressive/expedited ancillary services and/or consultation services, the necessity for IV medications, close physician monitoring and/or the possible need for procedures.  In such, I feel patient’s risk for adverse outcomes and need for care warrant INPATIENT evaluation and predict the patient’s care encounter to likely last beyond 2 midnights.      Electronically signed by Beverly Glasgow MD, 10/01/18, 3:20 AM

## 2018-10-01 NOTE — PLAN OF CARE
Problem: Patient Care Overview  Goal: Plan of Care Review  Outcome: Ongoing (interventions implemented as appropriate)   10/01/18 6447   Coping/Psychosocial   Plan of Care Reviewed With patient   Plan of Care Review   Progress improving   OTHER   Outcome Summary Patient up to chair for a few hours today with assist x2. She isn't able to bear weight d/t knee pain. VSS, rm air. Confusion worsening through the day, starting to pull off telemetry. Will continue to monitor.        Problem: Fall Risk (Adult)  Goal: Identify Related Risk Factors and Signs and Symptoms  Outcome: Outcome(s) achieved Date Met: 10/01/18    Goal: Absence of Fall  Outcome: Ongoing (interventions implemented as appropriate)      Problem: Skin Injury Risk (Adult)  Goal: Identify Related Risk Factors and Signs and Symptoms  Outcome: Outcome(s) achieved Date Met: 10/01/18    Goal: Skin Health and Integrity  Outcome: Ongoing (interventions implemented as appropriate)

## 2018-10-02 VITALS
TEMPERATURE: 97.6 F | DIASTOLIC BLOOD PRESSURE: 75 MMHG | RESPIRATION RATE: 20 BRPM | WEIGHT: 96 LBS | BODY MASS INDEX: 16.39 KG/M2 | OXYGEN SATURATION: 97 % | HEIGHT: 64 IN | HEART RATE: 75 BPM | SYSTOLIC BLOOD PRESSURE: 159 MMHG

## 2018-10-02 PROBLEM — I50.23 ACUTE ON CHRONIC SYSTOLIC (CONGESTIVE) HEART FAILURE (HCC): Status: RESOLVED | Noted: 2018-10-01 | Resolved: 2018-10-02

## 2018-10-02 PROBLEM — J96.01 ACUTE RESPIRATORY FAILURE WITH HYPOXIA (HCC): Status: RESOLVED | Noted: 2018-10-01 | Resolved: 2018-10-02

## 2018-10-02 PROBLEM — E87.20 LACTIC ACIDOSIS: Status: RESOLVED | Noted: 2018-10-01 | Resolved: 2018-10-02

## 2018-10-02 LAB
ANION GAP SERPL CALCULATED.3IONS-SCNC: 8 MMOL/L (ref 3–11)
BNP SERPL-MCNC: 1026 PG/ML (ref 0–100)
BUN BLD-MCNC: 20 MG/DL (ref 9–23)
BUN/CREAT SERPL: 13.7 (ref 7–25)
CALCIUM SPEC-SCNC: 6.9 MG/DL (ref 8.7–10.4)
CHLORIDE SERPL-SCNC: 107 MMOL/L (ref 99–109)
CO2 SERPL-SCNC: 27 MMOL/L (ref 20–31)
CREAT BLD-MCNC: 1.46 MG/DL (ref 0.6–1.3)
DEPRECATED RDW RBC AUTO: 54.2 FL (ref 37–54)
ERYTHROCYTE [DISTWIDTH] IN BLOOD BY AUTOMATED COUNT: 15 % (ref 11.3–14.5)
GFR SERPL CREATININE-BSD FRML MDRD: 33 ML/MIN/1.73
GLUCOSE BLD-MCNC: 81 MG/DL (ref 70–100)
HCT VFR BLD AUTO: 32.5 % (ref 34.5–44)
HGB BLD-MCNC: 10.3 G/DL (ref 11.5–15.5)
MCH RBC QN AUTO: 31.7 PG (ref 27–31)
MCHC RBC AUTO-ENTMCNC: 31.7 G/DL (ref 32–36)
MCV RBC AUTO: 100 FL (ref 80–99)
PLATELET # BLD AUTO: 149 10*3/MM3 (ref 150–450)
PMV BLD AUTO: 9.4 FL (ref 6–12)
POTASSIUM BLD-SCNC: 3.7 MMOL/L (ref 3.5–5.5)
RBC # BLD AUTO: 3.25 10*6/MM3 (ref 3.89–5.14)
SODIUM BLD-SCNC: 142 MMOL/L (ref 132–146)
WBC NRBC COR # BLD: 4.73 10*3/MM3 (ref 3.5–10.8)

## 2018-10-02 PROCEDURE — 85027 COMPLETE CBC AUTOMATED: CPT | Performed by: HOSPITALIST

## 2018-10-02 PROCEDURE — 83880 ASSAY OF NATRIURETIC PEPTIDE: CPT | Performed by: HOSPITALIST

## 2018-10-02 PROCEDURE — 25010000002 HEPARIN (PORCINE) PER 1000 UNITS: Performed by: HOSPITALIST

## 2018-10-02 PROCEDURE — 99239 HOSP IP/OBS DSCHRG MGMT >30: CPT | Performed by: INTERNAL MEDICINE

## 2018-10-02 PROCEDURE — 80048 BASIC METABOLIC PNL TOTAL CA: CPT | Performed by: HOSPITALIST

## 2018-10-02 RX ADMIN — Medication 325 MG: at 09:25

## 2018-10-02 RX ADMIN — SERTRALINE HYDROCHLORIDE 25 MG: 25 TABLET ORAL at 09:24

## 2018-10-02 RX ADMIN — FAMOTIDINE 20 MG: 20 TABLET, FILM COATED ORAL at 09:25

## 2018-10-02 RX ADMIN — HEPARIN SODIUM 5000 UNITS: 5000 INJECTION, SOLUTION INTRAVENOUS; SUBCUTANEOUS at 09:23

## 2018-10-02 RX ADMIN — ACETAMINOPHEN 650 MG: 325 TABLET ORAL at 09:23

## 2018-10-02 RX ADMIN — VITAMIN E CAP 400 UNIT 400 UNITS: 400 CAP at 09:25

## 2018-10-02 RX ADMIN — LEVOTHYROXINE SODIUM 50 MCG: 50 TABLET ORAL at 06:36

## 2018-10-02 RX ADMIN — FOLIC ACID 1 MG: 1 TABLET ORAL at 09:25

## 2018-10-02 RX ADMIN — CARVEDILOL 12.5 MG: 12.5 TABLET, FILM COATED ORAL at 09:25

## 2018-10-02 RX ADMIN — OXYCODONE HYDROCHLORIDE AND ACETAMINOPHEN 500 MG: 500 TABLET ORAL at 09:24

## 2018-10-02 NOTE — PROGRESS NOTES
Case Management Discharge Note    Final Note: Back to [facility] medicaid bed at [facility] via EMS at Noon. PCS form completed and placed on chartlet. Nurse to call report to [phone]. Liaison to obtain transfer summary from [facility]. Nurse to send hard Rx's with patient for any controlled substance ordered on d/c summary. Daughter is aware of transport back to facility.     Destination - Selection Complete     Service Request Status Selected Specialties Address Phone Number Fax Number    [facility] Selected Intermediate Care Facility [address] [place] [address] [phone]      Durable Medical Equipment     No service has been selected for the patient.      Dialysis/Infusion     No service has been selected for the patient.      Home Medical Care     No service has been selected for the patient.      Social Care     No service has been selected for the patient.             Final Discharge Disposition Code: 04 - intermediate care facility

## 2018-10-02 NOTE — PLAN OF CARE
Problem: Patient Care Overview  Goal: Plan of Care Review  Outcome: Ongoing (interventions implemented as appropriate)   10/02/18 0516   Coping/Psychosocial   Plan of Care Reviewed With patient   Plan of Care Review   Progress no change   OTHER   Outcome Summary VSS. pt rested well overnight. plan to go back to assisted living today. Will contiue to monitor.        Problem: Fall Risk (Adult)  Goal: Absence of Fall  Outcome: Ongoing (interventions implemented as appropriate)      Problem: Skin Injury Risk (Adult)  Goal: Skin Health and Integrity  Outcome: Ongoing (interventions implemented as appropriate)

## 2018-10-02 NOTE — PROGRESS NOTES
Case Management Discharge Note    Final Note: Back to LT medicaid bed at [facility] via EMS at Noon. PCS form completed and placed on chartlet. Nurse to call report to [phone]. Liaison to obtain transfer summary from [facility]. Nurse to send hard Rx's with patient for any controlled substance ordered on d/c summary. Daughter is aware of transport back to facility.     Destination - Selection Complete     Service Request Status Selected Specialties Address Phone Number Fax Number    [facility] Selected Intermediate Care Facility [address] [place] [address] [phone]      Durable Medical Equipment     No service has been selected for the patient.      Dialysis/Infusion     No service has been selected for the patient.      Home Medical Care     No service has been selected for the patient.      Social Care     No service has been selected for the patient.        Ambulance: [facility]    Final Discharge Disposition Code: 04 - intermediate care facility

## 2018-10-02 NOTE — DISCHARGE SUMMARY
[facility] Medicine Services  DISCHARGE SUMMARY    Patient Name: Imelda Maldonado  : 1923  MRN: [id number]    Date of Admission: 10/1/2018  Date of Discharge:  10/2/2018  Primary Care Physician: Leyla Castro DO    Consults     No orders found for last 30 day(s).        Hospital Course     Presenting Problem:   Acute respiratory failure with hypoxia (CMS/HCC) [J96.01]  Acute respiratory failure with hypoxia (CMS/HCC) [J96.01]    Active Hospital Problems    Diagnosis Date Noted   • CKD (chronic kidney disease) stage 3, GFR 30-59 ml/min (CMS/HCC) [N18.3] 10/01/2018      Resolved Hospital Problems    Diagnosis Date Noted Date Resolved   • **Acute respiratory failure with hypoxia (CMS/HCC) [J96.01] 10/01/2018 10/02/2018   • Acute on chronic systolic (congestive) heart failure (CMS/[facility]) [I50.23] 10/01/2018 10/02/2018   • Lactic acidosis [E87.2] 10/01/2018 10/02/2018        Hospital Course:  Imelda Maldonado is a 89 y.o. female  with history of chronic systolic heart failure (last EF 25-30%), BiV-ICD, CAD, CKD 3, Afib, DVT, GI bleed, and dementia.  She presented with respiratory distress and hypoxia, and was admitted with decompensated systolic heart failure.  She was placed on BiPAP, diuresed with Lasix and given to her nebs with good response.  Echo was done final read was pending at the time of d/c.She was initially on 2 L of oxygen per nasal cannula, however, after the above she's now on room air and doing well.  Patient was slightly confused but not too far off her baseline.  She was assisting stable and ready for discharge back SNF    Discharge Follow Up Recommendations for labs/diagnostics:  F/u with PCP in 1 week    Day of Discharge     HPI: No acute events overnight, patient is awake and alert, confused.    Review of Systems  Gen- No fevers, chills  CV- No chest pain, palpitations  Resp- No cough, dyspnea  GI- No N/V/D, abd pain    Otherwise ROS is negative except as mentioned  in the HPI.    Vital Signs:   Temp:  [97.4 °F (36.3 °C)-98 °F (36.7 °C)] 97.6 °F (36.4 °C)  Heart Rate:  [69-85] 75  Resp:  [12-20] 20  BP: (139-159)/(64-75) 159/75     Physical Exam:  Constitutional: Thin elderly female in no acute distress, awake, alert, but pleasantly confused  HENT: NCAT, mucous membranes moist  Respiratory: Non labored respirations, no wheezes/rhonchi.   Cardiovascular: RRR, no murmurs, rubs, or gallops, palpable pedal pulses bilaterally  Gastrointestinal: Positive bowel sounds, soft, nontender, nondistended  Musculoskeletal: No bilateral ankle edema  Psychiatric: Appropriate affect, cooperative  Neurologic: confused, no focal deficits  Skin: No rashes    Pertinent  and/or Most Recent Results       Results from last 7 days  Lab Units 10/02/18  0359 10/01/18  0037   WBC 10*3/mm3 4.73 11.00*   HEMOGLOBIN g/dL 10.3* 11.0*   HEMATOCRIT % 32.5* 36.2   PLATELETS 10*3/mm3 149* 246   SODIUM mmol/L 142 140   POTASSIUM mmol/L 3.7 4.6   CHLORIDE mmol/L 107 107   CO2 mmol/L 27.0 18.0*   BUN mg/dL 20 20   CREATININE mg/dL 1.46* 1.53*   GLUCOSE mg/dL 81 300*   CALCIUM mg/dL 6.9* 7.3*       Results from last 7 days  Lab Units 10/01/18  0037   PROTIME Seconds 12.0*   INR  1.14*   APTT seconds 24.9           Invalid input(s): TG, LDLCALC, LDLREALC    Results from last 7 days  Lab Units 10/02/18  0359 10/01/18  0037   BNP pg/mL 1,026.0* 859.0*     Brief Urine Lab Results  (Last result in the past 365 days)      Color   Clarity   Blood   Leuk Est   Nitrite   Protein   CREAT   Urine HCG        10/01/18 0159 Yellow Clear Negative Trace(A) Negative 30 mg/dL (1+)(A)               Microbiology Results Abnormal     Procedure Component Value - Date/Time    Blood Culture - Blood, [[id number]]  (Normal) Collected:  10/01/18 0112    Lab Status:  Preliminary result Specimen:  Blood from Arm, Left Updated:  10/02/18 0145     Blood Culture No growth at 24 hours    Blood Culture - Blood, [[id number]]  (Normal) Collected:   10/01/18 0113    Lab Status:  Preliminary result Specimen:  Blood from Arm, Right Updated:  10/02/18 0145     Blood Culture No growth at 24 hours          Imaging Results (all)     Procedure Component Value Units Date/Time    XR Chest 1 View [[id number]] Collected:  10/01/18 0028     Updated:  10/01/18 0142    Narrative:       EXAM:    XR Chest, 1 View    EXAM DATE/TIME:    10/1/2018 12:28 AM    CLINICAL HISTORY:    89 years old, female; Signs and symptoms; Dyspnea; SOB, chf.    TECHNIQUE:    Frontal view of the chest.    COMPARISON:    CR XR CHEST 1 VIEW PORTABLE 11/3/2015 5:46 AM    FINDINGS:    Again seen is a left chest wall pacemaker with moderate to severe enlargement   of the cardiac silhouette.  Diffuse aortic atherosclerosis without superior   mediastinal widening.      Diffuse pulmonary vascular prominence with interstitial thickening in both   lungs is new since the prior exam.  Possible trace left pleural effusion.  No   pneumothorax.      Impression:         Findings most consistent with mild to moderate CHF, new since the prior exam.    Enlargement of the cardiac silhouette.    THIS DOCUMENT HAS BEEN ELECTRONICALLY SIGNED BY MARLINE HAMILTON MD                    Results for orders placed in visit on 14   CONVERTED (HISTORICAL) ECHO    Narrative Patient:      LOBO RDZ    [facility] Rec#:     [id number]               :          1923            Date:         2014            Age:          89y                   Height:       162.56 cm / 64.0 in  Weight:       61.69 kg / 136.0 lbs  Sex:          F                     BSA:          1.66  Room#:        [facility]                      Sonographer:  Collett, Tamela RDCS, RDMS  Reading:      Les Cardozo MD, FACC, [facility]  ______________________________________________________________________    Transthoracic Echocardiogram    Indication:  CHF  BP:           137/53    Conclusions  1. The study quality is good.   2. The left ventricular chamber size is  normal.  3. Mild concentric left ventricular hypertrophy is observed.  4. There is moderate to severely decreased left ventricular systolic  function.   5. The estimated ejection fraction is 25-30%.   6. The left atrium is mildly dilated.  7. The right ventricle is slightly dilated.   8. There is moderate mitral regurgitation.   9. There is moderate aortic regurgitation.  10. There is mild tricuspid regurgitation with an RSVP=39 torr.  11. There is a small pericardial effusion.    Findings       Technical Comments:  The study quality is good.      Left Ventricle:  The left ventricular chamber size is normal. Mild concentric left  ventricular hypertrophy is observed. There is moderate to severely  decreased left ventricular systolic function.  The estimated ejection  fraction is 25-30%.      Left Atrium:  The left atrium is mildly dilated.     Right Ventricle:  The right ventricle is slightly dilated.  The right ventricular global  systolic function is normal. A pacemaker wire is visualized in the right  ventricle.     Right Atrium:  The right atrium is mildly dilated.  No atrial septal defect is  visualized.     Aortic Valve:  The aortic valve is trileaflet. There is moderate aortic regurgitation.  There is no evidence of aortic stenosis.     Mitral Valve:  The mitral valve leaflets appear normal. There is no evidence of mitral  valve prolapse. There is moderate mitral regurgitation.  There is no  evidence of mitral stenosis.     Tricuspid Valve:  The tricuspid valve leaflets are normal.  There is mild tricuspid  regurgitation.     Pulmonic Valve:  The pulmonic valve appears normal. There is mild pulmonic regurgitation.       Pericardium:  There is a small pericardial effusion.     Aorta:  There is no dilatation of the aortic root.     Pulmonary Artery:  The main pulmonary artery appears normal.     Venous:  The venous system appears normal. The inferior vena cava is dilated.      Measurements   Chambers  Name                     Value           RVIDd (AP) 2D           2.74 cm         IVSd (2D)               1.25 cm         LVIDd (2D)              5.25 cm         LVIDs (2D)              4.75 cm         LVPWd (2D)              1.13 cm         LA dimension (AP) 2D    4.5 cm            Diastolic/Systolic Function  Name                    Value           MV E-wave Vmax          0.94 m/sec        Aortic Valve  Name                    Value           AR PHT                  419 msec          Mitral Valve  Name                    Value           MR Vmax                 5.28 m/sec      MR VTI                  189 cm          MR volume (PISA)        15 ml           MR ERO                  0.08 cm2        MR PISA radius          0.4 cm          MR alias Vmax           39.3 cm/sec       Tricuspid Valve  Name                    Value           TR Vmax                 2.7 m/sec       TR peak gradient        29 mmHg         RAP                     10 mmHg         RVSP                    39 mmHg           Pulmonic Valve/Qp:Qs  Name                    Value           PV Vmax                 0.91 m/sec      PV peak gradient        3 mmHg          NV end-diastolic Vmax   1.32 m/sec        Electronically signed by: Les Cardozo MD, [facility], [facility] on 02/17/2014  19:52:36        Order Current Status    Blood Culture - Blood, Preliminary result    Blood Culture - Blood, Preliminary result        Discharge Details        Discharge Medications      Continue These Medications      Instructions Start Date   acetaminophen 325 MG tablet  Commonly known as:  TYLENOL   650 mg, Oral, Every 6 Hours PRN      acetaminophen 500 MG tablet  Commonly known as:  TYLENOL   500 mg, Oral, Every 6 Hours PRN      BISAC-EVAC 10 MG suppository  Generic drug:  bisacodyl   10 mg, Rectal, Daily PRN      carvedilol 12.5 MG tablet  Commonly known as:  COREG   12.5 mg, Oral, 2 Times Daily With Meals      diclofenac 1 % gel gel  Commonly known as:  VOLTAREN   4 g, Topical, As  Needed      donepezil 5 MG tablet  Commonly known as:  ARICEPT   5 mg, Oral, Nightly      ferrous sulfate 325 (65 FE) MG tablet   325 mg, Oral, Daily With Breakfast      folic acid 1 MG tablet  Commonly known as:  FOLVITE   1 mg, Oral, Daily      furosemide 40 MG tablet  Commonly known as:  LASIX   40 mg, Oral, Daily      levothyroxine 100 MCG tablet  Commonly known as:  SYNTHROID, LEVOTHROID   50 mcg, Oral, Daily      lisinopril 2.5 MG tablet  Commonly known as:  PRINIVIL,ZESTRIL   2.5 mg, Oral, Daily      MILK OF MAGNESIA PO   30 mL, Oral, Daily PRN      mirtazapine 15 MG tablet  Commonly known as:  REMERON   7.5 mg, Oral, Nightly      potassium chloride 20 MEQ CR tablet  Commonly known as:  K-DUR,KLOR-CON   20 mEq, Oral, Daily      sertraline 25 MG tablet  Commonly known as:  ZOLOFT   25 mg, Oral, Daily      traMADol 50 MG tablet  Commonly known as:  ULTRAM   50 mg, Oral, Nightly             Discharge Disposition:SNF      Discharge Diet:  Diet Instructions     Advance Diet As Tolerated             Discharge Activity:   Activity Instructions     Activity as Tolerated           Special Instructions:None    Code Status/Level of Support:  Code Status and Medical Interventions:   Ordered at: 10/01/18 0304     Limited Support to NOT Include:    Intubation    Cardioversion/Defibrillation     Code Status:    No CPR     Medical Interventions (Level of Support Prior to Arrest):    Limited       No future appointments.  Additional Instructions for the Follow-ups that You Need to Schedule     Discharge Follow-up with PCP    As directed      Currently Documented PCP:  Leyla Castro DO  PCP Phone Number:  [phone]    Follow Up Details:  1 week             Time Spent on Discharge:  30 minutes    Electronically signed by Bobby Diaz MD, 10/02/18, 11:13 AM.

## 2018-10-06 LAB
BACTERIA SPEC AEROBE CULT: NORMAL
BACTERIA SPEC AEROBE CULT: NORMAL
BH CV ECHO MEAS - AI DEC SLOPE: 334.7 CM/SEC^2
BH CV ECHO MEAS - AI MAX PG: 90.5 MMHG
BH CV ECHO MEAS - AI MAX VEL: 475.6 CM/SEC
BH CV ECHO MEAS - AI P1/2T: 416.2 MSEC
BH CV ECHO MEAS - AO MAX PG (FULL): 6.3 MMHG
BH CV ECHO MEAS - AO MAX PG: 8 MMHG
BH CV ECHO MEAS - AO MEAN PG (FULL): 3 MMHG
BH CV ECHO MEAS - AO MEAN PG: 3.9 MMHG
BH CV ECHO MEAS - AO ROOT AREA (BSA CORRECTED): 2
BH CV ECHO MEAS - AO ROOT AREA: 6.4 CM^2
BH CV ECHO MEAS - AO ROOT DIAM: 2.9 CM
BH CV ECHO MEAS - AO V2 MAX: 140.7 CM/SEC
BH CV ECHO MEAS - AO V2 MEAN: 90.9 CM/SEC
BH CV ECHO MEAS - AO V2 VTI: 25.3 CM
BH CV ECHO MEAS - AVA(I,A): 1.5 CM^2
BH CV ECHO MEAS - AVA(I,D): 1.5 CM^2
BH CV ECHO MEAS - AVA(V,A): 1.4 CM^2
BH CV ECHO MEAS - AVA(V,D): 1.4 CM^2
BH CV ECHO MEAS - BSA(HAYCOCK): 1.4 M^2
BH CV ECHO MEAS - BSA: 1.4 M^2
BH CV ECHO MEAS - BZI_BMI: 16.5 KILOGRAMS/M^2
BH CV ECHO MEAS - BZI_METRIC_HEIGHT: 162.6 CM
BH CV ECHO MEAS - BZI_METRIC_WEIGHT: 43.5 KG
BH CV ECHO MEAS - EDV(CUBED): 246 ML
BH CV ECHO MEAS - EDV(TEICH): 198.7 ML
BH CV ECHO MEAS - EF(CUBED): 23.8 %
BH CV ECHO MEAS - EF(TEICH): 18.7 %
BH CV ECHO MEAS - ESV(CUBED): 187.5 ML
BH CV ECHO MEAS - ESV(TEICH): 161.6 ML
BH CV ECHO MEAS - FS: 8.7 %
BH CV ECHO MEAS - IVS/LVPW: 0.98
BH CV ECHO MEAS - IVSD: 0.97 CM
BH CV ECHO MEAS - LA DIMENSION: 5 CM
BH CV ECHO MEAS - LA/AO: 1.7
BH CV ECHO MEAS - LAD MAJOR: 7.6 CM
BH CV ECHO MEAS - LAT PEAK E' VEL: 8.4 CM/SEC
BH CV ECHO MEAS - LATERAL E/E' RATIO: 12.4
BH CV ECHO MEAS - LV MASS(C)D: 259.4 GRAMS
BH CV ECHO MEAS - LV MASS(C)DI: 181.1 GRAMS/M^2
BH CV ECHO MEAS - LV MAX PG: 1.7 MMHG
BH CV ECHO MEAS - LV MEAN PG: 0.87 MMHG
BH CV ECHO MEAS - LV V1 MAX: 64.9 CM/SEC
BH CV ECHO MEAS - LV V1 MEAN: 42.7 CM/SEC
BH CV ECHO MEAS - LV V1 VTI: 12.8 CM
BH CV ECHO MEAS - LVIDD: 6.3 CM
BH CV ECHO MEAS - LVIDS: 5.7 CM
BH CV ECHO MEAS - LVOT AREA (M): 3.1 CM^2
BH CV ECHO MEAS - LVOT AREA: 3 CM^2
BH CV ECHO MEAS - LVOT DIAM: 2 CM
BH CV ECHO MEAS - LVPWD: 0.99 CM
BH CV ECHO MEAS - MED PEAK E' VEL: 6.9 CM/SEC
BH CV ECHO MEAS - MEDIAL E/E' RATIO: 15.1
BH CV ECHO MEAS - MR ALIAS VEL: 30.8 CM/SEC
BH CV ECHO MEAS - MR ERO: 0.5 CM^2
BH CV ECHO MEAS - MR FLOW RATE: 287.7 CM^3/SEC
BH CV ECHO MEAS - MR MAX PG: 131.5 MMHG
BH CV ECHO MEAS - MR MAX VEL: 573.4 CM/SEC
BH CV ECHO MEAS - MR MEAN PG: 74.5 MMHG
BH CV ECHO MEAS - MR MEAN VEL: 399.7 CM/SEC
BH CV ECHO MEAS - MR PISA RADIUS: 1.2 CM
BH CV ECHO MEAS - MR PISA: 9.3 CM^2
BH CV ECHO MEAS - MR VOLUME: 92.5 ML
BH CV ECHO MEAS - MR VTI: 184.4 CM
BH CV ECHO MEAS - MV AREA (1 DIAM): 9.5 CM^2
BH CV ECHO MEAS - MV DEC TIME: 0.14 SEC
BH CV ECHO MEAS - MV DIAM: 3.5 CM
BH CV ECHO MEAS - MV E MAX VEL: 106.1 CM/SEC
BH CV ECHO MEAS - MV FLOW AREA(1DIAM): 9.5 CM^2
BH CV ECHO MEAS - MV MAX PG: 5 MMHG
BH CV ECHO MEAS - MV MEAN PG: 2.1 MMHG
BH CV ECHO MEAS - MV V2 MAX: 111.3 CM/SEC
BH CV ECHO MEAS - MV V2 MEAN: 68.9 CM/SEC
BH CV ECHO MEAS - MV V2 VTI: 24.4 CM
BH CV ECHO MEAS - MVA(VTI): 1.6 CM^2
BH CV ECHO MEAS - PI END-D VEL: 102.1 CM/SEC
BH CV ECHO MEAS - RAP SYSTOLE: 8 MMHG
BH CV ECHO MEAS - RF(MV,AO)(1 DIAM): 0.3
BH CV ECHO MEAS - RF(MV,LVOT)(1DIAM): 0.83
BH CV ECHO MEAS - RVSP: 35 MMHG
BH CV ECHO MEAS - SI(AO): 113.2 ML/M^2
BH CV ECHO MEAS - SI(CUBED): 40.9 ML/M^2
BH CV ECHO MEAS - SI(LVOT): 26.8 ML/M^2
BH CV ECHO MEAS - SI(MV 1 DIAM): 161 ML/M^2
BH CV ECHO MEAS - SI(TEICH): 25.9 ML/M^2
BH CV ECHO MEAS - SV(AO): 162.1 ML
BH CV ECHO MEAS - SV(CUBED): 58.5 ML
BH CV ECHO MEAS - SV(LVOT): 38.3 ML
BH CV ECHO MEAS - SV(MV 1 DIAM): 230.5 ML
BH CV ECHO MEAS - SV(TEICH): 37.1 ML
BH CV ECHO MEAS - TR MAX PG: 27 MMHG
BH CV ECHO MEAS - TR MAX VEL: 258.7 CM/SEC
BH CV ECHO MEASUREMENTS AVERAGE E/E' RATIO: 13.87
BH CV VAS BP RIGHT ARM: NORMAL MMHG
BH CV XLRA - RV BASE: 3.1 CM
BH CV XLRA - RV LENGTH: 7.5 CM
BH CV XLRA - RV MID: 1.8 CM
BH CV XLRA - TDI S': 7.2 CM/SEC
LEFT ATRIUM VOLUME INDEX: 90.1 ML/M^2
LEFT ATRIUM VOLUME: 131 CM3
MAXIMAL PREDICTED HEART RATE: 125 BPM
MR PISA EROA: 0.48 CM2
MV REGURGITANT FRACTION: 38 %
PISA ALIASING VEL: 3.1 M/S
PISA RADIUS: 1.2 CM
STRESS TARGET HR: 106 BPM

## 2019-01-01 ENCOUNTER — HOSPITAL ENCOUNTER (INPATIENT)
Facility: HOSPITAL | Age: 84
LOS: 2 days | Discharge: NURSING, REHAB OR LONG-TERM CARE | End: 2019-08-21
Attending: EMERGENCY MEDICINE | Admitting: FAMILY MEDICINE

## 2019-01-01 ENCOUNTER — APPOINTMENT (OUTPATIENT)
Dept: GENERAL RADIOLOGY | Facility: HOSPITAL | Age: 84
End: 2019-01-01

## 2019-01-01 ENCOUNTER — OFFICE VISIT (OUTPATIENT)
Dept: CARDIOLOGY | Facility: CLINIC | Age: 84
End: 2019-01-01

## 2019-01-01 ENCOUNTER — APPOINTMENT (OUTPATIENT)
Dept: CT IMAGING | Facility: HOSPITAL | Age: 84
End: 2019-01-01

## 2019-01-01 ENCOUNTER — HOSPITAL ENCOUNTER (INPATIENT)
Facility: HOSPITAL | Age: 84
LOS: 3 days | Discharge: NURSING, REHAB OR LONG-TERM CARE | End: 2019-09-07
Attending: EMERGENCY MEDICINE | Admitting: HOSPITALIST

## 2019-01-01 ENCOUNTER — APPOINTMENT (OUTPATIENT)
Dept: CARDIOLOGY | Facility: HOSPITAL | Age: 84
End: 2019-01-01

## 2019-01-01 ENCOUNTER — DOCUMENTATION (OUTPATIENT)
Dept: CARDIAC REHAB | Facility: HOSPITAL | Age: 84
End: 2019-01-01

## 2019-01-01 VITALS
BODY MASS INDEX: 16.39 KG/M2 | WEIGHT: 96 LBS | HEART RATE: 72 BPM | HEIGHT: 64 IN | SYSTOLIC BLOOD PRESSURE: 100 MMHG | DIASTOLIC BLOOD PRESSURE: 52 MMHG

## 2019-01-01 VITALS
HEIGHT: 64 IN | RESPIRATION RATE: 18 BRPM | TEMPERATURE: 97.8 F | HEART RATE: 74 BPM | OXYGEN SATURATION: 95 % | WEIGHT: 133.38 LBS | SYSTOLIC BLOOD PRESSURE: 100 MMHG | DIASTOLIC BLOOD PRESSURE: 68 MMHG | BODY MASS INDEX: 22.77 KG/M2

## 2019-01-01 VITALS
HEART RATE: 75 BPM | OXYGEN SATURATION: 90 % | HEIGHT: 64 IN | RESPIRATION RATE: 20 BRPM | WEIGHT: 128.8 LBS | SYSTOLIC BLOOD PRESSURE: 123 MMHG | TEMPERATURE: 97.7 F | BODY MASS INDEX: 21.99 KG/M2 | DIASTOLIC BLOOD PRESSURE: 88 MMHG

## 2019-01-01 DIAGNOSIS — D64.9 ANEMIA, UNSPECIFIED TYPE: ICD-10-CM

## 2019-01-01 DIAGNOSIS — R13.10 DYSPHAGIA, UNSPECIFIED TYPE: ICD-10-CM

## 2019-01-01 DIAGNOSIS — R77.8 ELEVATED TROPONIN: ICD-10-CM

## 2019-01-01 DIAGNOSIS — R06.02 SHORTNESS OF BREATH: ICD-10-CM

## 2019-01-01 DIAGNOSIS — R09.02 HYPOXIA: ICD-10-CM

## 2019-01-01 DIAGNOSIS — I42.9 IDIOPATHIC CARDIOMYOPATHY (HCC): Primary | ICD-10-CM

## 2019-01-01 DIAGNOSIS — I50.9 ACUTE ON CHRONIC CONGESTIVE HEART FAILURE, UNSPECIFIED HEART FAILURE TYPE (HCC): Primary | ICD-10-CM

## 2019-01-01 DIAGNOSIS — Z95.0 PACEMAKER: ICD-10-CM

## 2019-01-01 DIAGNOSIS — J18.9 HCAP (HEALTHCARE-ASSOCIATED PNEUMONIA): Primary | ICD-10-CM

## 2019-01-01 LAB
ABO + RH BLD: NORMAL
ABO GROUP BLD: NORMAL
ALBUMIN SERPL-MCNC: 3 G/DL (ref 3.5–5.2)
ALBUMIN SERPL-MCNC: 3.1 G/DL (ref 3.5–5.2)
ALBUMIN SERPL-MCNC: 3.1 G/DL (ref 3.5–5.2)
ALBUMIN SERPL-MCNC: 3.3 G/DL (ref 3.5–5.2)
ALBUMIN/GLOB SERPL: 0.7 G/DL
ALBUMIN/GLOB SERPL: 0.8 G/DL
ALP SERPL-CCNC: 114 U/L (ref 39–117)
ALP SERPL-CCNC: 84 U/L (ref 39–117)
ALP SERPL-CCNC: 84 U/L (ref 39–117)
ALT SERPL W P-5'-P-CCNC: 10 U/L (ref 1–33)
ALT SERPL W P-5'-P-CCNC: <5 U/L (ref 1–33)
ALT SERPL W P-5'-P-CCNC: <5 U/L (ref 1–33)
ANION GAP SERPL CALCULATED.3IONS-SCNC: 11 MMOL/L (ref 5–15)
ANION GAP SERPL CALCULATED.3IONS-SCNC: 11 MMOL/L (ref 5–15)
ANION GAP SERPL CALCULATED.3IONS-SCNC: 12 MMOL/L (ref 5–15)
ANION GAP SERPL CALCULATED.3IONS-SCNC: 13 MMOL/L (ref 5–15)
ANTI-FYA: NORMAL
AST SERPL-CCNC: 16 U/L (ref 1–32)
AST SERPL-CCNC: 16 U/L (ref 1–32)
AST SERPL-CCNC: 34 U/L (ref 1–32)
BACTERIA SPEC AEROBE CULT: NORMAL
BACTERIA SPEC AEROBE CULT: NORMAL
BASOPHILS # BLD AUTO: 0.01 10*3/MM3 (ref 0–0.2)
BASOPHILS # BLD AUTO: 0.01 10*3/MM3 (ref 0–0.2)
BASOPHILS # BLD AUTO: 0.02 10*3/MM3 (ref 0–0.2)
BASOPHILS NFR BLD AUTO: 0.2 % (ref 0–1.5)
BASOPHILS NFR BLD AUTO: 0.2 % (ref 0–1.5)
BASOPHILS NFR BLD AUTO: 0.3 % (ref 0–1.5)
BH BB BLOOD EXPIRATION DATE: NORMAL
BH BB BLOOD TYPE BARCODE: 600
BH BB DISPENSE STATUS: NORMAL
BH BB PRODUCT CODE: NORMAL
BH BB UNIT NUMBER: NORMAL
BH CV ECHO MEAS - AI DEC SLOPE: 226.3 CM/SEC^2
BH CV ECHO MEAS - AI MAX PG: 50.9 MMHG
BH CV ECHO MEAS - AI MAX VEL: 356.6 CM/SEC
BH CV ECHO MEAS - AI P1/2T: 461.5 MSEC
BH CV ECHO MEAS - AO ROOT AREA (BSA CORRECTED): 2.5
BH CV ECHO MEAS - AO ROOT AREA: 11.1 CM^2
BH CV ECHO MEAS - AO ROOT DIAM: 3.8 CM
BH CV ECHO MEAS - BSA(HAYCOCK): 1.5 M^2
BH CV ECHO MEAS - BSA: 1.5 M^2
BH CV ECHO MEAS - BZI_BMI: 19.1 KILOGRAMS/M^2
BH CV ECHO MEAS - BZI_METRIC_HEIGHT: 162.6 CM
BH CV ECHO MEAS - BZI_METRIC_WEIGHT: 50.3 KG
BH CV ECHO MEAS - EDV(CUBED): 266.8 ML
BH CV ECHO MEAS - EDV(MOD-SP2): 121 ML
BH CV ECHO MEAS - EDV(MOD-SP4): 166 ML
BH CV ECHO MEAS - EDV(TEICH): 211.3 ML
BH CV ECHO MEAS - EF(CUBED): 29.9 %
BH CV ECHO MEAS - EF(MOD-BP): 27 %
BH CV ECHO MEAS - EF(MOD-SP2): 43 %
BH CV ECHO MEAS - EF(MOD-SP4): 26.5 %
BH CV ECHO MEAS - EF(TEICH): 23.7 %
BH CV ECHO MEAS - ESV(CUBED): 187 ML
BH CV ECHO MEAS - ESV(MOD-SP2): 69 ML
BH CV ECHO MEAS - ESV(MOD-SP4): 122 ML
BH CV ECHO MEAS - ESV(TEICH): 161.2 ML
BH CV ECHO MEAS - FS: 11.2 %
BH CV ECHO MEAS - IVS/LVPW: 0.67
BH CV ECHO MEAS - IVSD: 1.2 CM
BH CV ECHO MEAS - LA DIMENSION: 5.1 CM
BH CV ECHO MEAS - LA/AO: 1.4
BH CV ECHO MEAS - LAD MAJOR: 6 CM
BH CV ECHO MEAS - LAT PEAK E' VEL: 7.3 CM/SEC
BH CV ECHO MEAS - LATERAL E/E' RATIO: 9.5
BH CV ECHO MEAS - LV DIASTOLIC VOL/BSA (35-75): 109 ML/M^2
BH CV ECHO MEAS - LV MASS(C)D: 319.1 GRAMS
BH CV ECHO MEAS - LV MASS(C)DI: 209.5 GRAMS/M^2
BH CV ECHO MEAS - LV MAX PG: 1.2 MMHG
BH CV ECHO MEAS - LV MEAN PG: 0.57 MMHG
BH CV ECHO MEAS - LV SYSTOLIC VOL/BSA (12-30): 80.1 ML/M^2
BH CV ECHO MEAS - LV V1 MAX: 55.3 CM/SEC
BH CV ECHO MEAS - LV V1 MEAN: 35 CM/SEC
BH CV ECHO MEAS - LV V1 VTI: 11.8 CM
BH CV ECHO MEAS - LVIDD: 6.4 CM
BH CV ECHO MEAS - LVIDS: 5.7 CM
BH CV ECHO MEAS - LVLD AP2: 7.2 CM
BH CV ECHO MEAS - LVLD AP4: 7.8 CM
BH CV ECHO MEAS - LVLS AP2: 6 CM
BH CV ECHO MEAS - LVLS AP4: 7.2 CM
BH CV ECHO MEAS - LVOT AREA (M): 3.1 CM^2
BH CV ECHO MEAS - LVOT AREA: 3 CM^2
BH CV ECHO MEAS - LVOT DIAM: 2 CM
BH CV ECHO MEAS - LVPWD: 1.3 CM
BH CV ECHO MEAS - MED PEAK E' VEL: 4.4 CM/SEC
BH CV ECHO MEAS - MEDIAL E/E' RATIO: 15.7
BH CV ECHO MEAS - MR MAX PG: 127 MMHG
BH CV ECHO MEAS - MR MAX VEL: 560.2 CM/SEC
BH CV ECHO MEAS - MR MEAN PG: 70 MMHG
BH CV ECHO MEAS - MR MEAN VEL: 383.6 CM/SEC
BH CV ECHO MEAS - MR PISA RADIUS: 0.43 CM
BH CV ECHO MEAS - MR PISA: 1.2 CM^2
BH CV ECHO MEAS - MR VTI: 199.9 CM
BH CV ECHO MEAS - MV A MAX VEL: 26.2 CM/SEC
BH CV ECHO MEAS - MV AREA (1 DIAM): 0.31 CM^2
BH CV ECHO MEAS - MV DEC TIME: 0.11 SEC
BH CV ECHO MEAS - MV DIAM: 0.63 CM
BH CV ECHO MEAS - MV E MAX VEL: 71.1 CM/SEC
BH CV ECHO MEAS - MV E/A: 2.7
BH CV ECHO MEAS - MV FLOW AREA(1DIAM): 0.31 CM^2
BH CV ECHO MEAS - PA ACC SLOPE: 305.1 CM/SEC^2
BH CV ECHO MEAS - PA ACC TIME: 0.12 SEC
BH CV ECHO MEAS - PA PR(ACCEL): 25.1 MMHG
BH CV ECHO MEAS - PI END-D VEL: 137.1 CM/SEC
BH CV ECHO MEAS - RAP SYSTOLE: 8 MMHG
BH CV ECHO MEAS - RVDD: 3.1 CM
BH CV ECHO MEAS - RVSP: 25 MMHG
BH CV ECHO MEAS - SI(CUBED): 52.4 ML/M^2
BH CV ECHO MEAS - SI(LVOT): 23.4 ML/M^2
BH CV ECHO MEAS - SI(MOD-SP2): 34.1 ML/M^2
BH CV ECHO MEAS - SI(MOD-SP4): 28.9 ML/M^2
BH CV ECHO MEAS - SI(TEICH): 32.9 ML/M^2
BH CV ECHO MEAS - SV(CUBED): 79.8 ML
BH CV ECHO MEAS - SV(LVOT): 35.7 ML
BH CV ECHO MEAS - SV(MOD-SP2): 52 ML
BH CV ECHO MEAS - SV(MOD-SP4): 44 ML
BH CV ECHO MEAS - SV(TEICH): 50.1 ML
BH CV ECHO MEAS - TAPSE (>1.6): 1 CM2
BH CV ECHO MEAS - TR MAX PG: 17 MMHG
BH CV ECHO MEAS - TR MAX VEL: 207.7 CM/SEC
BH CV ECHO MEASUREMENTS AVERAGE E/E' RATIO: 12.15
BH CV XLRA - RV BASE: 3.6 CM
BH CV XLRA - RV LENGTH: 5.4 CM
BH CV XLRA - RV MID: 2.1 CM
BH CV XLRA - TDI S': 6.58 CM/SEC
BILIRUB CONJ SERPL-MCNC: <0.2 MG/DL (ref 0.2–0.3)
BILIRUB INDIRECT SERPL-MCNC: ABNORMAL MG/DL
BILIRUB SERPL-MCNC: 0.3 MG/DL (ref 0.2–1.2)
BILIRUB SERPL-MCNC: 0.3 MG/DL (ref 0.2–1.2)
BILIRUB SERPL-MCNC: 0.4 MG/DL (ref 0.2–1.2)
BLD GP AB SCN SERPL QL: POSITIVE
BUN BLD-MCNC: 20 MG/DL (ref 8–23)
BUN BLD-MCNC: 25 MG/DL (ref 8–23)
BUN BLD-MCNC: 33 MG/DL (ref 8–23)
BUN BLD-MCNC: 36 MG/DL (ref 8–23)
BUN BLD-MCNC: 40 MG/DL (ref 8–23)
BUN BLD-MCNC: 41 MG/DL (ref 8–23)
BUN BLDA-MCNC: 31 MG/DL (ref 8–26)
BUN/CREAT SERPL: 17.1 (ref 7–25)
BUN/CREAT SERPL: 21.2 (ref 7–25)
BUN/CREAT SERPL: 26.7 (ref 7–25)
BUN/CREAT SERPL: 29.5 (ref 7–25)
BUN/CREAT SERPL: 31.1 (ref 7–25)
BUN/CREAT SERPL: 31.3 (ref 7–25)
CA-I BLDA-SCNC: 1.07 MMOL/L (ref 1.2–1.32)
CALCIUM SPEC-SCNC: 6.9 MG/DL (ref 8.2–9.6)
CALCIUM SPEC-SCNC: 7 MG/DL (ref 8.2–9.6)
CALCIUM SPEC-SCNC: 7.3 MG/DL (ref 8.2–9.6)
CALCIUM SPEC-SCNC: 7.4 MG/DL (ref 8.2–9.6)
CALCIUM SPEC-SCNC: 7.4 MG/DL (ref 8.2–9.6)
CALCIUM SPEC-SCNC: 7.6 MG/DL (ref 8.2–9.6)
CHLORIDE BLDA-SCNC: 107 MMOL/L (ref 98–109)
CHLORIDE SERPL-SCNC: 101 MMOL/L (ref 98–107)
CHLORIDE SERPL-SCNC: 101 MMOL/L (ref 98–107)
CHLORIDE SERPL-SCNC: 103 MMOL/L (ref 98–107)
CHLORIDE SERPL-SCNC: 104 MMOL/L (ref 98–107)
CHLORIDE SERPL-SCNC: 106 MMOL/L (ref 98–107)
CHLORIDE SERPL-SCNC: 99 MMOL/L (ref 98–107)
CO2 BLDA-SCNC: 24 MMOL/L (ref 24–29)
CO2 SERPL-SCNC: 24 MMOL/L (ref 22–29)
CO2 SERPL-SCNC: 24 MMOL/L (ref 22–29)
CO2 SERPL-SCNC: 25 MMOL/L (ref 22–29)
CO2 SERPL-SCNC: 27 MMOL/L (ref 22–29)
CO2 SERPL-SCNC: 28 MMOL/L (ref 22–29)
CO2 SERPL-SCNC: 29 MMOL/L (ref 22–29)
CREAT BLD-MCNC: 1.06 MG/DL (ref 0.57–1)
CREAT BLD-MCNC: 1.17 MG/DL (ref 0.57–1)
CREAT BLD-MCNC: 1.18 MG/DL (ref 0.57–1)
CREAT BLD-MCNC: 1.28 MG/DL (ref 0.57–1)
CREAT BLD-MCNC: 1.35 MG/DL (ref 0.57–1)
CREAT BLD-MCNC: 1.39 MG/DL (ref 0.57–1)
CREAT BLDA-MCNC: 1.3 MG/DL (ref 0.6–1.3)
CROSSMATCH INTERPRETATION: NORMAL
D-LACTATE SERPL-SCNC: 1 MMOL/L (ref 0.5–2)
DEPRECATED RDW RBC AUTO: 63.2 FL (ref 37–54)
DEPRECATED RDW RBC AUTO: 65.1 FL (ref 37–54)
DEPRECATED RDW RBC AUTO: 67.7 FL (ref 37–54)
DEPRECATED RDW RBC AUTO: 73.7 FL (ref 37–54)
DEPRECATED RDW RBC AUTO: 75.3 FL (ref 37–54)
EOSINOPHIL # BLD AUTO: 0.09 10*3/MM3 (ref 0–0.4)
EOSINOPHIL # BLD AUTO: 0.1 10*3/MM3 (ref 0–0.4)
EOSINOPHIL # BLD AUTO: 0.1 10*3/MM3 (ref 0–0.4)
EOSINOPHIL NFR BLD AUTO: 1.5 % (ref 0.3–6.2)
EOSINOPHIL NFR BLD AUTO: 1.5 % (ref 0.3–6.2)
EOSINOPHIL NFR BLD AUTO: 2.2 % (ref 0.3–6.2)
ERYTHROCYTE [DISTWIDTH] IN BLOOD BY AUTOMATED COUNT: 16.4 % (ref 12.3–15.4)
ERYTHROCYTE [DISTWIDTH] IN BLOOD BY AUTOMATED COUNT: 17 % (ref 12.3–15.4)
ERYTHROCYTE [DISTWIDTH] IN BLOOD BY AUTOMATED COUNT: 17.2 % (ref 12.3–15.4)
ERYTHROCYTE [DISTWIDTH] IN BLOOD BY AUTOMATED COUNT: 20.6 % (ref 12.3–15.4)
ERYTHROCYTE [DISTWIDTH] IN BLOOD BY AUTOMATED COUNT: 21.2 % (ref 12.3–15.4)
FERRITIN SERPL-MCNC: 120.8 NG/ML (ref 13–150)
FOLATE SERPL-MCNC: >20 NG/ML (ref 4.78–24.2)
GFR SERPL CREATININE-BSD FRML MDRD: 35 ML/MIN/1.73
GFR SERPL CREATININE-BSD FRML MDRD: 36 ML/MIN/1.73
GFR SERPL CREATININE-BSD FRML MDRD: 39 ML/MIN/1.73
GFR SERPL CREATININE-BSD FRML MDRD: 42 ML/MIN/1.73
GFR SERPL CREATININE-BSD FRML MDRD: 43 ML/MIN/1.73
GFR SERPL CREATININE-BSD FRML MDRD: 48 ML/MIN/1.73
GLOBULIN UR ELPH-MCNC: 4.4 GM/DL
GLOBULIN UR ELPH-MCNC: 4.6 GM/DL
GLUCOSE BLD-MCNC: 162 MG/DL (ref 65–99)
GLUCOSE BLD-MCNC: 70 MG/DL (ref 65–99)
GLUCOSE BLD-MCNC: 79 MG/DL (ref 65–99)
GLUCOSE BLD-MCNC: 83 MG/DL (ref 65–99)
GLUCOSE BLD-MCNC: 87 MG/DL (ref 65–99)
GLUCOSE BLD-MCNC: 91 MG/DL (ref 65–99)
GLUCOSE BLDC GLUCOMTR-MCNC: 86 MG/DL (ref 70–130)
HCT VFR BLD AUTO: 26 % (ref 34–46.6)
HCT VFR BLD AUTO: 27.3 % (ref 34–46.6)
HCT VFR BLD AUTO: 28.3 % (ref 34–46.6)
HCT VFR BLD AUTO: 28.7 % (ref 34–46.6)
HCT VFR BLD AUTO: 31.6 % (ref 34–46.6)
HCT VFR BLDA CALC: 21 % (ref 38–51)
HGB BLD-MCNC: 7.7 G/DL (ref 12–15.9)
HGB BLD-MCNC: 7.8 G/DL (ref 12–15.9)
HGB BLD-MCNC: 8.3 G/DL (ref 12–15.9)
HGB BLD-MCNC: 8.7 G/DL (ref 12–15.9)
HGB BLD-MCNC: 9.2 G/DL (ref 12–15.9)
HGB BLDA-MCNC: 7.1 G/DL (ref 12–17)
HOLD SPECIMEN: NORMAL
IMM GRANULOCYTES # BLD AUTO: 0.02 10*3/MM3 (ref 0–0.05)
IMM GRANULOCYTES # BLD AUTO: 0.02 10*3/MM3 (ref 0–0.05)
IMM GRANULOCYTES # BLD AUTO: 0.03 10*3/MM3 (ref 0–0.05)
IMM GRANULOCYTES NFR BLD AUTO: 0.3 % (ref 0–0.5)
IMM GRANULOCYTES NFR BLD AUTO: 0.4 % (ref 0–0.5)
IMM GRANULOCYTES NFR BLD AUTO: 0.5 % (ref 0–0.5)
INR PPP: 1.27 (ref 0.85–1.16)
IRON 24H UR-MRATE: 46 MCG/DL (ref 37–145)
IRON SATN MFR SERPL: 14 % (ref 20–50)
LEFT ATRIUM VOLUME INDEX: 95.9 ML/M^2
LEFT ATRIUM VOLUME: 146 ML
LV EF 2D ECHO EST: 24 %
LYMPHOCYTES # BLD AUTO: 0.61 10*3/MM3 (ref 0.7–3.1)
LYMPHOCYTES # BLD AUTO: 0.72 10*3/MM3 (ref 0.7–3.1)
LYMPHOCYTES # BLD AUTO: 0.74 10*3/MM3 (ref 0.7–3.1)
LYMPHOCYTES NFR BLD AUTO: 12.1 % (ref 19.6–45.3)
LYMPHOCYTES NFR BLD AUTO: 15.5 % (ref 19.6–45.3)
LYMPHOCYTES NFR BLD AUTO: 9.4 % (ref 19.6–45.3)
MAGNESIUM SERPL-MCNC: 2.1 MG/DL (ref 1.7–2.3)
MAGNESIUM SERPL-MCNC: 2.3 MG/DL (ref 1.7–2.3)
MAXIMAL PREDICTED HEART RATE: 124 BPM
MCH RBC QN AUTO: 29.8 PG (ref 26.6–33)
MCH RBC QN AUTO: 30.4 PG (ref 26.6–33)
MCH RBC QN AUTO: 30.6 PG (ref 26.6–33)
MCH RBC QN AUTO: 31 PG (ref 26.6–33)
MCH RBC QN AUTO: 31.4 PG (ref 26.6–33)
MCHC RBC AUTO-ENTMCNC: 28.6 G/DL (ref 31.5–35.7)
MCHC RBC AUTO-ENTMCNC: 29.1 G/DL (ref 31.5–35.7)
MCHC RBC AUTO-ENTMCNC: 29.3 G/DL (ref 31.5–35.7)
MCHC RBC AUTO-ENTMCNC: 29.6 G/DL (ref 31.5–35.7)
MCHC RBC AUTO-ENTMCNC: 30.3 G/DL (ref 31.5–35.7)
MCV RBC AUTO: 100.3 FL (ref 79–97)
MCV RBC AUTO: 100.8 FL (ref 79–97)
MCV RBC AUTO: 105.6 FL (ref 79–97)
MCV RBC AUTO: 107.1 FL (ref 79–97)
MCV RBC AUTO: 107.8 FL (ref 79–97)
MONOCYTES # BLD AUTO: 0.44 10*3/MM3 (ref 0.1–0.9)
MONOCYTES # BLD AUTO: 0.46 10*3/MM3 (ref 0.1–0.9)
MONOCYTES # BLD AUTO: 0.53 10*3/MM3 (ref 0.1–0.9)
MONOCYTES NFR BLD AUTO: 7.5 % (ref 5–12)
MONOCYTES NFR BLD AUTO: 8.2 % (ref 5–12)
MONOCYTES NFR BLD AUTO: 9.5 % (ref 5–12)
MRSA DNA SPEC QL NAA+PROBE: NEGATIVE
NEUTROPHILS # BLD AUTO: 3.35 10*3/MM3 (ref 1.7–7)
NEUTROPHILS # BLD AUTO: 4.79 10*3/MM3 (ref 1.7–7)
NEUTROPHILS # BLD AUTO: 5.18 10*3/MM3 (ref 1.7–7)
NEUTROPHILS NFR BLD AUTO: 72.2 % (ref 42.7–76)
NEUTROPHILS NFR BLD AUTO: 78.2 % (ref 42.7–76)
NEUTROPHILS NFR BLD AUTO: 80.3 % (ref 42.7–76)
NRBC BLD AUTO-RTO: 0 /100 WBC (ref 0–0.2)
NT-PROBNP SERPL-MCNC: 7535 PG/ML (ref 5–1800)
NT-PROBNP SERPL-MCNC: ABNORMAL PG/ML (ref 5–1800)
PHOSPHATE SERPL-MCNC: 3.6 MG/DL (ref 2.5–4.5)
PLATELET # BLD AUTO: 154 10*3/MM3 (ref 140–450)
PLATELET # BLD AUTO: 172 10*3/MM3 (ref 140–450)
PLATELET # BLD AUTO: 180 10*3/MM3 (ref 140–450)
PLATELET # BLD AUTO: 181 10*3/MM3 (ref 140–450)
PLATELET # BLD AUTO: 192 10*3/MM3 (ref 140–450)
PMV BLD AUTO: 8.9 FL (ref 6–12)
PMV BLD AUTO: 9.1 FL (ref 6–12)
PMV BLD AUTO: 9.1 FL (ref 6–12)
PMV BLD AUTO: 9.5 FL (ref 6–12)
PMV BLD AUTO: 9.5 FL (ref 6–12)
POTASSIUM BLD-SCNC: 4 MMOL/L (ref 3.5–5.2)
POTASSIUM BLD-SCNC: 4.2 MMOL/L (ref 3.5–5.2)
POTASSIUM BLD-SCNC: 4.6 MMOL/L (ref 3.5–5.2)
POTASSIUM BLD-SCNC: 4.7 MMOL/L (ref 3.5–5.2)
POTASSIUM BLD-SCNC: 4.9 MMOL/L (ref 3.5–5.2)
POTASSIUM BLD-SCNC: 5 MMOL/L (ref 3.5–5.2)
POTASSIUM BLDA-SCNC: 4.7 MMOL/L (ref 3.5–4.9)
PROCALCITONIN SERPL-MCNC: 0.15 NG/ML (ref 0.1–0.25)
PROT SERPL-MCNC: 7.7 G/DL (ref 6–8.5)
PROTHROMBIN TIME: 15.3 SECONDS (ref 11.2–14.3)
RBC # BLD AUTO: 2.55 10*6/MM3 (ref 3.77–5.28)
RBC # BLD AUTO: 2.58 10*6/MM3 (ref 3.77–5.28)
RBC # BLD AUTO: 2.68 10*6/MM3 (ref 3.77–5.28)
RBC # BLD AUTO: 2.86 10*6/MM3 (ref 3.77–5.28)
RBC # BLD AUTO: 2.93 10*6/MM3 (ref 3.77–5.28)
RH BLD: POSITIVE
SODIUM BLD-SCNC: 139 MMOL/L (ref 136–145)
SODIUM BLD-SCNC: 140 MMOL/L (ref 136–145)
SODIUM BLD-SCNC: 141 MMOL/L (ref 136–145)
SODIUM BLD-SCNC: 142 MMOL/L (ref 136–145)
SODIUM BLDA-SCNC: 141 MMOL/L (ref 138–146)
STRESS TARGET HR: 105 BPM
T&S EXPIRATION DATE: NORMAL
TIBC SERPL-MCNC: 323 MCG/DL (ref 298–536)
TRANSFERRIN SERPL-MCNC: 217 MG/DL (ref 200–360)
TROPONIN T SERPL-MCNC: 0.02 NG/ML (ref 0–0.03)
TROPONIN T SERPL-MCNC: 0.03 NG/ML (ref 0–0.03)
TROPONIN T SERPL-MCNC: 0.03 NG/ML (ref 0–0.03)
TROPONIN T SERPL-MCNC: 0.04 NG/ML (ref 0–0.03)
TROPONIN T SERPL-MCNC: 0.04 NG/ML (ref 0–0.03)
UNIT  ABO: NORMAL
UNIT  RH: NORMAL
WBC NRBC COR # BLD: 4.64 10*3/MM3 (ref 3.4–10.8)
WBC NRBC COR # BLD: 4.82 10*3/MM3 (ref 3.4–10.8)
WBC NRBC COR # BLD: 5.21 10*3/MM3 (ref 3.4–10.8)
WBC NRBC COR # BLD: 6.12 10*3/MM3 (ref 3.4–10.8)
WBC NRBC COR # BLD: 6.46 10*3/MM3 (ref 3.4–10.8)
WHOLE BLOOD HOLD SPECIMEN: NORMAL

## 2019-01-01 PROCEDURE — 25010000002 HEPARIN (PORCINE) PER 1000 UNITS: Performed by: NURSE PRACTITIONER

## 2019-01-01 PROCEDURE — 86900 BLOOD TYPING SEROLOGIC ABO: CPT | Performed by: FAMILY MEDICINE

## 2019-01-01 PROCEDURE — G0378 HOSPITAL OBSERVATION PER HR: HCPCS

## 2019-01-01 PROCEDURE — 25010000002 PIPERACILLIN SOD-TAZOBACTAM PER 1 G: Performed by: PHYSICIAN ASSISTANT

## 2019-01-01 PROCEDURE — 99285 EMERGENCY DEPT VISIT HI MDM: CPT

## 2019-01-01 PROCEDURE — 71275 CT ANGIOGRAPHY CHEST: CPT

## 2019-01-01 PROCEDURE — 83880 ASSAY OF NATRIURETIC PEPTIDE: CPT | Performed by: EMERGENCY MEDICINE

## 2019-01-01 PROCEDURE — 84145 PROCALCITONIN (PCT): CPT | Performed by: INTERNAL MEDICINE

## 2019-01-01 PROCEDURE — 97610 LOW FREQUENCY NON-THERMAL US: CPT

## 2019-01-01 PROCEDURE — 97162 PT EVAL MOD COMPLEX 30 MIN: CPT

## 2019-01-01 PROCEDURE — 93005 ELECTROCARDIOGRAM TRACING: CPT

## 2019-01-01 PROCEDURE — 94799 UNLISTED PULMONARY SVC/PX: CPT

## 2019-01-01 PROCEDURE — 85027 COMPLETE CBC AUTOMATED: CPT | Performed by: HOSPITALIST

## 2019-01-01 PROCEDURE — 84484 ASSAY OF TROPONIN QUANT: CPT | Performed by: EMERGENCY MEDICINE

## 2019-01-01 PROCEDURE — 93005 ELECTROCARDIOGRAM TRACING: CPT | Performed by: NURSE PRACTITIONER

## 2019-01-01 PROCEDURE — 99232 SBSQ HOSP IP/OBS MODERATE 35: CPT | Performed by: HOSPITALIST

## 2019-01-01 PROCEDURE — 99223 1ST HOSP IP/OBS HIGH 75: CPT | Performed by: FAMILY MEDICINE

## 2019-01-01 PROCEDURE — 99232 SBSQ HOSP IP/OBS MODERATE 35: CPT | Performed by: INTERNAL MEDICINE

## 2019-01-01 PROCEDURE — 82728 ASSAY OF FERRITIN: CPT | Performed by: NURSE PRACTITIONER

## 2019-01-01 PROCEDURE — 80069 RENAL FUNCTION PANEL: CPT | Performed by: PHYSICIAN ASSISTANT

## 2019-01-01 PROCEDURE — 99212 OFFICE O/P EST SF 10 MIN: CPT | Performed by: INTERNAL MEDICINE

## 2019-01-01 PROCEDURE — 93306 TTE W/DOPPLER COMPLETE: CPT | Performed by: INTERNAL MEDICINE

## 2019-01-01 PROCEDURE — 86901 BLOOD TYPING SEROLOGIC RH(D): CPT | Performed by: FAMILY MEDICINE

## 2019-01-01 PROCEDURE — 86870 RBC ANTIBODY IDENTIFICATION: CPT | Performed by: FAMILY MEDICINE

## 2019-01-01 PROCEDURE — 86900 BLOOD TYPING SEROLOGIC ABO: CPT

## 2019-01-01 PROCEDURE — 80053 COMPREHEN METABOLIC PANEL: CPT | Performed by: EMERGENCY MEDICINE

## 2019-01-01 PROCEDURE — 36430 TRANSFUSION BLD/BLD COMPNT: CPT

## 2019-01-01 PROCEDURE — 92526 ORAL FUNCTION THERAPY: CPT

## 2019-01-01 PROCEDURE — 99222 1ST HOSP IP/OBS MODERATE 55: CPT | Performed by: INTERNAL MEDICINE

## 2019-01-01 PROCEDURE — 71045 X-RAY EXAM CHEST 1 VIEW: CPT

## 2019-01-01 PROCEDURE — 84484 ASSAY OF TROPONIN QUANT: CPT | Performed by: NURSE PRACTITIONER

## 2019-01-01 PROCEDURE — 99232 SBSQ HOSP IP/OBS MODERATE 35: CPT | Performed by: FAMILY MEDICINE

## 2019-01-01 PROCEDURE — 83735 ASSAY OF MAGNESIUM: CPT | Performed by: INTERNAL MEDICINE

## 2019-01-01 PROCEDURE — 85610 PROTHROMBIN TIME: CPT | Performed by: EMERGENCY MEDICINE

## 2019-01-01 PROCEDURE — 93010 ELECTROCARDIOGRAM REPORT: CPT | Performed by: INTERNAL MEDICINE

## 2019-01-01 PROCEDURE — 83605 ASSAY OF LACTIC ACID: CPT | Performed by: EMERGENCY MEDICINE

## 2019-01-01 PROCEDURE — 25010000002 FUROSEMIDE PER 20 MG: Performed by: EMERGENCY MEDICINE

## 2019-01-01 PROCEDURE — 97530 THERAPEUTIC ACTIVITIES: CPT

## 2019-01-01 PROCEDURE — 80076 HEPATIC FUNCTION PANEL: CPT | Performed by: EMERGENCY MEDICINE

## 2019-01-01 PROCEDURE — 83540 ASSAY OF IRON: CPT | Performed by: NURSE PRACTITIONER

## 2019-01-01 PROCEDURE — 99239 HOSP IP/OBS DSCHRG MGMT >30: CPT | Performed by: NURSE PRACTITIONER

## 2019-01-01 PROCEDURE — 85027 COMPLETE CBC AUTOMATED: CPT | Performed by: INTERNAL MEDICINE

## 2019-01-01 PROCEDURE — 94640 AIRWAY INHALATION TREATMENT: CPT

## 2019-01-01 PROCEDURE — 80048 BASIC METABOLIC PNL TOTAL CA: CPT | Performed by: INTERNAL MEDICINE

## 2019-01-01 PROCEDURE — 97161 PT EVAL LOW COMPLEX 20 MIN: CPT

## 2019-01-01 PROCEDURE — 83735 ASSAY OF MAGNESIUM: CPT | Performed by: PHYSICIAN ASSISTANT

## 2019-01-01 PROCEDURE — 85025 COMPLETE CBC W/AUTO DIFF WBC: CPT | Performed by: EMERGENCY MEDICINE

## 2019-01-01 PROCEDURE — 99223 1ST HOSP IP/OBS HIGH 75: CPT | Performed by: HOSPITALIST

## 2019-01-01 PROCEDURE — 93005 ELECTROCARDIOGRAM TRACING: CPT | Performed by: EMERGENCY MEDICINE

## 2019-01-01 PROCEDURE — 25010000002 FUROSEMIDE PER 20 MG: Performed by: HOSPITALIST

## 2019-01-01 PROCEDURE — 25010000002 IOPAMIDOL 61 % SOLUTION: Performed by: EMERGENCY MEDICINE

## 2019-01-01 PROCEDURE — 84466 ASSAY OF TRANSFERRIN: CPT | Performed by: NURSE PRACTITIONER

## 2019-01-01 PROCEDURE — 25010000002 PIPERACILLIN SOD-TAZOBACTAM PER 1 G: Performed by: EMERGENCY MEDICINE

## 2019-01-01 PROCEDURE — 99239 HOSP IP/OBS DSCHRG MGMT >30: CPT | Performed by: FAMILY MEDICINE

## 2019-01-01 PROCEDURE — 25010000002 VANCOMYCIN 10 G RECONSTITUTED SOLUTION: Performed by: EMERGENCY MEDICINE

## 2019-01-01 PROCEDURE — 85025 COMPLETE CBC W/AUTO DIFF WBC: CPT | Performed by: FAMILY MEDICINE

## 2019-01-01 PROCEDURE — 80048 BASIC METABOLIC PNL TOTAL CA: CPT | Performed by: HOSPITALIST

## 2019-01-01 PROCEDURE — 86850 RBC ANTIBODY SCREEN: CPT | Performed by: FAMILY MEDICINE

## 2019-01-01 PROCEDURE — 86920 COMPATIBILITY TEST SPIN: CPT

## 2019-01-01 PROCEDURE — 85007 BL SMEAR W/DIFF WBC COUNT: CPT | Performed by: FAMILY MEDICINE

## 2019-01-01 PROCEDURE — 99221 1ST HOSP IP/OBS SF/LOW 40: CPT | Performed by: INTERNAL MEDICINE

## 2019-01-01 PROCEDURE — 99233 SBSQ HOSP IP/OBS HIGH 50: CPT | Performed by: HOSPITALIST

## 2019-01-01 PROCEDURE — P9016 RBC LEUKOCYTES REDUCED: HCPCS

## 2019-01-01 PROCEDURE — 92610 EVALUATE SWALLOWING FUNCTION: CPT

## 2019-01-01 PROCEDURE — 85014 HEMATOCRIT: CPT

## 2019-01-01 PROCEDURE — 25010000002 FUROSEMIDE PER 20 MG: Performed by: FAMILY MEDICINE

## 2019-01-01 PROCEDURE — 80047 BASIC METABLC PNL IONIZED CA: CPT

## 2019-01-01 PROCEDURE — 87641 MR-STAPH DNA AMP PROBE: CPT

## 2019-01-01 PROCEDURE — 84484 ASSAY OF TROPONIN QUANT: CPT | Performed by: PHYSICIAN ASSISTANT

## 2019-01-01 PROCEDURE — 93005 ELECTROCARDIOGRAM TRACING: CPT | Performed by: INTERNAL MEDICINE

## 2019-01-01 PROCEDURE — 93306 TTE W/DOPPLER COMPLETE: CPT

## 2019-01-01 PROCEDURE — 97110 THERAPEUTIC EXERCISES: CPT

## 2019-01-01 PROCEDURE — 82746 ASSAY OF FOLIC ACID SERUM: CPT | Performed by: NURSE PRACTITIONER

## 2019-01-01 PROCEDURE — 87040 BLOOD CULTURE FOR BACTERIA: CPT | Performed by: EMERGENCY MEDICINE

## 2019-01-01 PROCEDURE — 86922 COMPATIBILITY TEST ANTIGLOB: CPT

## 2019-01-01 RX ORDER — LEVOTHYROXINE SODIUM 0.05 MG/1
50 TABLET ORAL DAILY
Status: DISCONTINUED | OUTPATIENT
Start: 2019-01-01 | End: 2019-01-01 | Stop reason: HOSPADM

## 2019-01-01 RX ORDER — IPRATROPIUM BROMIDE AND ALBUTEROL SULFATE 2.5; .5 MG/3ML; MG/3ML
3 SOLUTION RESPIRATORY (INHALATION)
Status: DISCONTINUED | OUTPATIENT
Start: 2019-01-01 | End: 2019-01-01 | Stop reason: HOSPADM

## 2019-01-01 RX ORDER — BISACODYL 10 MG
10 SUPPOSITORY, RECTAL RECTAL DAILY PRN
Status: DISCONTINUED | OUTPATIENT
Start: 2019-01-01 | End: 2019-01-01 | Stop reason: HOSPADM

## 2019-01-01 RX ORDER — SODIUM CHLORIDE 0.9 % (FLUSH) 0.9 %
10 SYRINGE (ML) INJECTION AS NEEDED
Status: DISCONTINUED | OUTPATIENT
Start: 2019-01-01 | End: 2019-01-01 | Stop reason: HOSPADM

## 2019-01-01 RX ORDER — SODIUM CHLORIDE 0.9 % (FLUSH) 0.9 %
3 SYRINGE (ML) INJECTION EVERY 12 HOURS SCHEDULED
Status: DISCONTINUED | OUTPATIENT
Start: 2019-01-01 | End: 2019-01-01 | Stop reason: HOSPADM

## 2019-01-01 RX ORDER — MIRTAZAPINE 15 MG/1
15 TABLET, FILM COATED ORAL DAILY
Status: DISCONTINUED | OUTPATIENT
Start: 2019-01-01 | End: 2019-01-01 | Stop reason: HOSPADM

## 2019-01-01 RX ORDER — ACETAMINOPHEN 325 MG/1
650 TABLET ORAL EVERY 6 HOURS PRN
Status: DISCONTINUED | OUTPATIENT
Start: 2019-01-01 | End: 2019-01-01 | Stop reason: HOSPADM

## 2019-01-01 RX ORDER — FUROSEMIDE 10 MG/ML
40 INJECTION INTRAMUSCULAR; INTRAVENOUS ONCE
Status: COMPLETED | OUTPATIENT
Start: 2019-01-01 | End: 2019-01-01

## 2019-01-01 RX ORDER — LEVOTHYROXINE SODIUM 0.05 MG/1
50 TABLET ORAL DAILY
COMMUNITY

## 2019-01-01 RX ORDER — FUROSEMIDE 40 MG/1
40 TABLET ORAL 2 TIMES DAILY
Start: 2019-01-01

## 2019-01-01 RX ORDER — LISINOPRIL 2.5 MG/1
2.5 TABLET ORAL DAILY
Status: DISCONTINUED | OUTPATIENT
Start: 2019-01-01 | End: 2019-01-01 | Stop reason: HOSPADM

## 2019-01-01 RX ORDER — LEVOTHYROXINE SODIUM 0.05 MG/1
50 TABLET ORAL
Status: DISCONTINUED | OUTPATIENT
Start: 2019-01-01 | End: 2019-01-01 | Stop reason: HOSPADM

## 2019-01-01 RX ORDER — BUMETANIDE 0.25 MG/ML
1 INJECTION INTRAMUSCULAR; INTRAVENOUS EVERY 12 HOURS
Status: DISCONTINUED | OUTPATIENT
Start: 2019-01-01 | End: 2019-01-01

## 2019-01-01 RX ORDER — CARVEDILOL 12.5 MG/1
12.5 TABLET ORAL 2 TIMES DAILY WITH MEALS
Status: DISCONTINUED | OUTPATIENT
Start: 2019-01-01 | End: 2019-01-01 | Stop reason: HOSPADM

## 2019-01-01 RX ORDER — HEPARIN SODIUM 5000 [USP'U]/ML
5000 INJECTION, SOLUTION INTRAVENOUS; SUBCUTANEOUS EVERY 8 HOURS SCHEDULED
Status: DISCONTINUED | OUTPATIENT
Start: 2019-01-01 | End: 2019-01-01 | Stop reason: HOSPADM

## 2019-01-01 RX ORDER — FUROSEMIDE 40 MG/1
40 TABLET ORAL 3 TIMES WEEKLY
Status: ON HOLD
Start: 2019-01-01 | End: 2019-01-01 | Stop reason: SDUPTHER

## 2019-01-01 RX ORDER — SODIUM CHLORIDE 0.9 % (FLUSH) 0.9 %
3-10 SYRINGE (ML) INJECTION AS NEEDED
Status: DISCONTINUED | OUTPATIENT
Start: 2019-01-01 | End: 2019-01-01 | Stop reason: HOSPADM

## 2019-01-01 RX ORDER — FERROUS SULFATE 325(65) MG
325 TABLET ORAL
Status: DISCONTINUED | OUTPATIENT
Start: 2019-01-01 | End: 2019-01-01 | Stop reason: HOSPADM

## 2019-01-01 RX ORDER — IPRATROPIUM BROMIDE AND ALBUTEROL SULFATE 2.5; .5 MG/3ML; MG/3ML
3 SOLUTION RESPIRATORY (INHALATION) EVERY 4 HOURS PRN
Status: ON HOLD | COMMUNITY
End: 2019-01-01

## 2019-01-01 RX ORDER — IPRATROPIUM BROMIDE AND ALBUTEROL SULFATE 2.5; .5 MG/3ML; MG/3ML
3 SOLUTION RESPIRATORY (INHALATION) EVERY 4 HOURS PRN
Status: DISCONTINUED | OUTPATIENT
Start: 2019-01-01 | End: 2019-01-01 | Stop reason: HOSPADM

## 2019-01-01 RX ORDER — MIRTAZAPINE 15 MG/1
15 TABLET, FILM COATED ORAL DAILY
COMMUNITY

## 2019-01-01 RX ORDER — METOLAZONE 5 MG/1
5 TABLET ORAL ONCE
Status: COMPLETED | OUTPATIENT
Start: 2019-01-01 | End: 2019-01-01

## 2019-01-01 RX ORDER — FUROSEMIDE 10 MG/ML
60 INJECTION INTRAMUSCULAR; INTRAVENOUS ONCE
Status: COMPLETED | OUTPATIENT
Start: 2019-01-01 | End: 2019-01-01

## 2019-01-01 RX ORDER — IPRATROPIUM BROMIDE AND ALBUTEROL SULFATE 2.5; .5 MG/3ML; MG/3ML
3 SOLUTION RESPIRATORY (INHALATION) EVERY 4 HOURS PRN
Start: 2019-01-01

## 2019-01-01 RX ORDER — DONEPEZIL HYDROCHLORIDE 5 MG/1
5 TABLET, FILM COATED ORAL NIGHTLY
Status: DISCONTINUED | OUTPATIENT
Start: 2019-01-01 | End: 2019-01-01 | Stop reason: HOSPADM

## 2019-01-01 RX ORDER — POTASSIUM CHLORIDE 750 MG/1
20 CAPSULE, EXTENDED RELEASE ORAL
Status: DISCONTINUED | OUTPATIENT
Start: 2019-01-01 | End: 2019-01-01 | Stop reason: HOSPADM

## 2019-01-01 RX ORDER — ASPIRIN 300 MG/1
300 SUPPOSITORY RECTAL ONCE
Status: COMPLETED | OUTPATIENT
Start: 2019-01-01 | End: 2019-01-01

## 2019-01-01 RX ORDER — NITROGLYCERIN 0.4 MG/1
0.4 TABLET SUBLINGUAL
COMMUNITY

## 2019-01-01 RX ORDER — HYDROCODONE BITARTRATE AND ACETAMINOPHEN 5; 325 MG/1; MG/1
1 TABLET ORAL ONCE AS NEEDED
Status: COMPLETED | OUTPATIENT
Start: 2019-01-01 | End: 2019-01-01

## 2019-01-01 RX ORDER — FOLIC ACID 1 MG/1
1 TABLET ORAL DAILY
Status: DISCONTINUED | OUTPATIENT
Start: 2019-01-01 | End: 2019-01-01 | Stop reason: HOSPADM

## 2019-01-01 RX ORDER — FUROSEMIDE 10 MG/ML
20 INJECTION INTRAMUSCULAR; INTRAVENOUS ONCE
Status: COMPLETED | OUTPATIENT
Start: 2019-01-01 | End: 2019-01-01

## 2019-01-01 RX ORDER — MIRTAZAPINE 15 MG/1
7.5 TABLET, FILM COATED ORAL NIGHTLY
Status: DISCONTINUED | OUTPATIENT
Start: 2019-01-01 | End: 2019-01-01 | Stop reason: HOSPADM

## 2019-01-01 RX ORDER — LISINOPRIL 2.5 MG/1
2.5 TABLET ORAL DAILY
Status: DISCONTINUED | OUTPATIENT
Start: 2019-01-01 | End: 2019-01-01

## 2019-01-01 RX ORDER — FUROSEMIDE 10 MG/ML
20 INJECTION INTRAMUSCULAR; INTRAVENOUS EVERY 12 HOURS
Status: DISCONTINUED | OUTPATIENT
Start: 2019-01-01 | End: 2019-01-01 | Stop reason: HOSPADM

## 2019-01-01 RX ADMIN — LISINOPRIL 2.5 MG: 2.5 TABLET ORAL at 08:52

## 2019-01-01 RX ADMIN — LISINOPRIL 2.5 MG: 2.5 TABLET ORAL at 09:50

## 2019-01-01 RX ADMIN — MIRTAZAPINE 7.5 MG: 15 TABLET, FILM COATED ORAL at 21:28

## 2019-01-01 RX ADMIN — FOLIC ACID 1 MG: 1 TABLET ORAL at 09:50

## 2019-01-01 RX ADMIN — CARVEDILOL 12.5 MG: 12.5 TABLET, FILM COATED ORAL at 17:50

## 2019-01-01 RX ADMIN — SERTRALINE HYDROCHLORIDE 50 MG: 50 TABLET ORAL at 08:54

## 2019-01-01 RX ADMIN — TAZOBACTAM SODIUM AND PIPERACILLIN SODIUM 3.38 G: 375; 3 INJECTION, SOLUTION INTRAVENOUS at 00:27

## 2019-01-01 RX ADMIN — MIRTAZAPINE 15 MG: 15 TABLET, FILM COATED ORAL at 08:51

## 2019-01-01 RX ADMIN — LEVOTHYROXINE SODIUM 50 MCG: 50 TABLET ORAL at 05:38

## 2019-01-01 RX ADMIN — SERTRALINE HYDROCHLORIDE 50 MG: 50 TABLET ORAL at 08:52

## 2019-01-01 RX ADMIN — FUROSEMIDE 20 MG: 10 INJECTION, SOLUTION INTRAMUSCULAR; INTRAVENOUS at 18:05

## 2019-01-01 RX ADMIN — TAZOBACTAM SODIUM AND PIPERACILLIN SODIUM 3.38 G: 375; 3 INJECTION, SOLUTION INTRAVENOUS at 13:04

## 2019-01-01 RX ADMIN — SODIUM CHLORIDE, PRESERVATIVE FREE 10 ML: 5 INJECTION INTRAVENOUS at 09:51

## 2019-01-01 RX ADMIN — TAZOBACTAM SODIUM AND PIPERACILLIN SODIUM 3.38 G: 375; 3 INJECTION, SOLUTION INTRAVENOUS at 09:00

## 2019-01-01 RX ADMIN — IPRATROPIUM BROMIDE AND ALBUTEROL SULFATE 3 ML: 2.5; .5 SOLUTION RESPIRATORY (INHALATION) at 16:17

## 2019-01-01 RX ADMIN — CARVEDILOL 12.5 MG: 12.5 TABLET, FILM COATED ORAL at 08:45

## 2019-01-01 RX ADMIN — MIRTAZAPINE 15 MG: 15 TABLET, FILM COATED ORAL at 09:10

## 2019-01-01 RX ADMIN — IOPAMIDOL 50 ML: 612 INJECTION, SOLUTION INTRAVENOUS at 04:30

## 2019-01-01 RX ADMIN — SERTRALINE HYDROCHLORIDE 50 MG: 50 TABLET ORAL at 09:20

## 2019-01-01 RX ADMIN — Medication 325 MG: at 08:53

## 2019-01-01 RX ADMIN — SODIUM CHLORIDE, PRESERVATIVE FREE 3 ML: 5 INJECTION INTRAVENOUS at 21:29

## 2019-01-01 RX ADMIN — TAZOBACTAM SODIUM AND PIPERACILLIN SODIUM 3.38 G: 375; 3 INJECTION, SOLUTION INTRAVENOUS at 00:22

## 2019-01-01 RX ADMIN — TAZOBACTAM SODIUM AND PIPERACILLIN SODIUM 3.38 G: 375; 3 INJECTION, SOLUTION INTRAVENOUS at 05:36

## 2019-01-01 RX ADMIN — IPRATROPIUM BROMIDE AND ALBUTEROL SULFATE 3 ML: 2.5; .5 SOLUTION RESPIRATORY (INHALATION) at 06:37

## 2019-01-01 RX ADMIN — CARVEDILOL 12.5 MG: 12.5 TABLET, FILM COATED ORAL at 08:57

## 2019-01-01 RX ADMIN — FOLIC ACID 1 MG: 1 TABLET ORAL at 08:52

## 2019-01-01 RX ADMIN — FUROSEMIDE 20 MG: 10 INJECTION, SOLUTION INTRAMUSCULAR; INTRAVENOUS at 05:38

## 2019-01-01 RX ADMIN — TAZOBACTAM SODIUM AND PIPERACILLIN SODIUM 3.38 G: 375; 3 INJECTION, SOLUTION INTRAVENOUS at 16:41

## 2019-01-01 RX ADMIN — MIRTAZAPINE 15 MG: 15 TABLET, FILM COATED ORAL at 08:57

## 2019-01-01 RX ADMIN — LISINOPRIL 2.5 MG: 2.5 TABLET ORAL at 08:57

## 2019-01-01 RX ADMIN — SODIUM CHLORIDE, PRESERVATIVE FREE 3 ML: 5 INJECTION INTRAVENOUS at 08:54

## 2019-01-01 RX ADMIN — DONEPEZIL HYDROCHLORIDE 5 MG: 5 TABLET ORAL at 21:28

## 2019-01-01 RX ADMIN — HEPARIN SODIUM 5000 UNITS: 5000 INJECTION INTRAVENOUS; SUBCUTANEOUS at 05:39

## 2019-01-01 RX ADMIN — IPRATROPIUM BROMIDE AND ALBUTEROL SULFATE 3 ML: 2.5; .5 SOLUTION RESPIRATORY (INHALATION) at 12:27

## 2019-01-01 RX ADMIN — FERROUS SULFATE TAB 325 MG (65 MG ELEMENTAL FE) 325 MG: 325 (65 FE) TAB at 08:52

## 2019-01-01 RX ADMIN — IPRATROPIUM BROMIDE AND ALBUTEROL SULFATE 3 ML: 2.5; .5 SOLUTION RESPIRATORY (INHALATION) at 09:24

## 2019-01-01 RX ADMIN — IPRATROPIUM BROMIDE AND ALBUTEROL SULFATE 3 ML: 2.5; .5 SOLUTION RESPIRATORY (INHALATION) at 07:56

## 2019-01-01 RX ADMIN — LEVOTHYROXINE SODIUM 50 MCG: 50 TABLET ORAL at 09:57

## 2019-01-01 RX ADMIN — MIRTAZAPINE 15 MG: 15 TABLET, FILM COATED ORAL at 09:50

## 2019-01-01 RX ADMIN — FUROSEMIDE 20 MG: 10 INJECTION, SOLUTION INTRAMUSCULAR; INTRAVENOUS at 05:05

## 2019-01-01 RX ADMIN — METOLAZONE 5 MG: 5 TABLET ORAL at 12:57

## 2019-01-01 RX ADMIN — MIRTAZAPINE 7.5 MG: 15 TABLET, FILM COATED ORAL at 21:36

## 2019-01-01 RX ADMIN — DICLOFENAC SODIUM 4 G: 10 GEL TOPICAL at 12:54

## 2019-01-01 RX ADMIN — IPRATROPIUM BROMIDE AND ALBUTEROL SULFATE 3 ML: 2.5; .5 SOLUTION RESPIRATORY (INHALATION) at 19:40

## 2019-01-01 RX ADMIN — FOLIC ACID 1 MG: 1 TABLET ORAL at 08:45

## 2019-01-01 RX ADMIN — BUMETANIDE 1 MG: 0.25 INJECTION INTRAMUSCULAR; INTRAVENOUS at 12:56

## 2019-01-01 RX ADMIN — FERROUS SULFATE TAB 325 MG (65 MG ELEMENTAL FE) 325 MG: 325 (65 FE) TAB at 09:09

## 2019-01-01 RX ADMIN — LEVOTHYROXINE SODIUM 50 MCG: 50 TABLET ORAL at 05:06

## 2019-01-01 RX ADMIN — HEPARIN SODIUM 5000 UNITS: 5000 INJECTION INTRAVENOUS; SUBCUTANEOUS at 06:24

## 2019-01-01 RX ADMIN — HEPARIN SODIUM 5000 UNITS: 5000 INJECTION INTRAVENOUS; SUBCUTANEOUS at 13:24

## 2019-01-01 RX ADMIN — DONEPEZIL HYDROCHLORIDE 5 MG: 5 TABLET ORAL at 21:36

## 2019-01-01 RX ADMIN — CARVEDILOL 12.5 MG: 12.5 TABLET, FILM COATED ORAL at 17:39

## 2019-01-01 RX ADMIN — DONEPEZIL HYDROCHLORIDE 5 MG: 5 TABLET ORAL at 21:24

## 2019-01-01 RX ADMIN — IPRATROPIUM BROMIDE AND ALBUTEROL SULFATE 3 ML: 2.5; .5 SOLUTION RESPIRATORY (INHALATION) at 12:00

## 2019-01-01 RX ADMIN — CARVEDILOL 12.5 MG: 12.5 TABLET, FILM COATED ORAL at 08:54

## 2019-01-01 RX ADMIN — FUROSEMIDE 40 MG: 10 INJECTION, SOLUTION INTRAMUSCULAR; INTRAVENOUS at 13:05

## 2019-01-01 RX ADMIN — FUROSEMIDE 20 MG: 10 INJECTION, SOLUTION INTRAMUSCULAR; INTRAVENOUS at 18:15

## 2019-01-01 RX ADMIN — LEVOTHYROXINE SODIUM 50 MCG: 50 TABLET ORAL at 05:39

## 2019-01-01 RX ADMIN — CARVEDILOL 12.5 MG: 12.5 TABLET, FILM COATED ORAL at 09:51

## 2019-01-01 RX ADMIN — FUROSEMIDE 20 MG: 10 INJECTION, SOLUTION INTRAMUSCULAR; INTRAVENOUS at 17:39

## 2019-01-01 RX ADMIN — LISINOPRIL 2.5 MG: 2.5 TABLET ORAL at 09:10

## 2019-01-01 RX ADMIN — HEPARIN SODIUM 5000 UNITS: 5000 INJECTION INTRAVENOUS; SUBCUTANEOUS at 21:36

## 2019-01-01 RX ADMIN — TAZOBACTAM SODIUM AND PIPERACILLIN SODIUM 3.38 G: 375; 3 INJECTION, SOLUTION INTRAVENOUS at 01:09

## 2019-01-01 RX ADMIN — CARVEDILOL 12.5 MG: 12.5 TABLET, FILM COATED ORAL at 18:05

## 2019-01-01 RX ADMIN — FOLIC ACID 1 MG: 1 TABLET ORAL at 08:57

## 2019-01-01 RX ADMIN — FOLIC ACID 1 MG: 1 TABLET ORAL at 08:53

## 2019-01-01 RX ADMIN — ACETAMINOPHEN 650 MG: 325 TABLET, FILM COATED ORAL at 10:34

## 2019-01-01 RX ADMIN — ACETAMINOPHEN 650 MG: 325 TABLET ORAL at 01:20

## 2019-01-01 RX ADMIN — Medication 325 MG: at 09:20

## 2019-01-01 RX ADMIN — ASPIRIN 300 MG: 300 SUPPOSITORY RECTAL at 05:49

## 2019-01-01 RX ADMIN — CARVEDILOL 12.5 MG: 12.5 TABLET, FILM COATED ORAL at 17:58

## 2019-01-01 RX ADMIN — SODIUM CHLORIDE, PRESERVATIVE FREE 3 ML: 5 INJECTION INTRAVENOUS at 08:48

## 2019-01-01 RX ADMIN — HEPARIN SODIUM 5000 UNITS: 5000 INJECTION INTRAVENOUS; SUBCUTANEOUS at 06:58

## 2019-01-01 RX ADMIN — SERTRALINE HYDROCHLORIDE 50 MG: 50 TABLET ORAL at 09:50

## 2019-01-01 RX ADMIN — CARVEDILOL 12.5 MG: 12.5 TABLET, FILM COATED ORAL at 08:52

## 2019-01-01 RX ADMIN — SERTRALINE HYDROCHLORIDE 50 MG: 50 TABLET ORAL at 08:45

## 2019-01-01 RX ADMIN — POTASSIUM CHLORIDE 20 MEQ: 750 CAPSULE, EXTENDED RELEASE ORAL at 08:43

## 2019-01-01 RX ADMIN — LEVOTHYROXINE SODIUM 50 MCG: 50 TABLET ORAL at 06:58

## 2019-01-01 RX ADMIN — HYDROCODONE BITARTRATE AND ACETAMINOPHEN 1 TABLET: 5; 325 TABLET ORAL at 22:24

## 2019-01-01 RX ADMIN — FUROSEMIDE 60 MG: 10 INJECTION, SOLUTION INTRAMUSCULAR; INTRAVENOUS at 02:47

## 2019-01-01 RX ADMIN — CARVEDILOL 12.5 MG: 12.5 TABLET, FILM COATED ORAL at 09:09

## 2019-01-01 RX ADMIN — TAZOBACTAM SODIUM AND PIPERACILLIN SODIUM 3.38 G: 375; 3 INJECTION, SOLUTION INTRAVENOUS at 16:51

## 2019-01-01 RX ADMIN — HEPARIN SODIUM 5000 UNITS: 5000 INJECTION INTRAVENOUS; SUBCUTANEOUS at 21:28

## 2019-01-01 RX ADMIN — FERROUS SULFATE TAB 325 MG (65 MG ELEMENTAL FE) 325 MG: 325 (65 FE) TAB at 08:57

## 2019-01-01 RX ADMIN — LISINOPRIL 2.5 MG: 2.5 TABLET ORAL at 09:20

## 2019-01-01 RX ADMIN — SERTRALINE HYDROCHLORIDE 50 MG: 50 TABLET ORAL at 08:57

## 2019-01-01 RX ADMIN — FOLIC ACID 1 MG: 1 TABLET ORAL at 09:10

## 2019-01-01 RX ADMIN — IPRATROPIUM BROMIDE AND ALBUTEROL SULFATE 3 ML: 2.5; .5 SOLUTION RESPIRATORY (INHALATION) at 17:10

## 2019-01-01 RX ADMIN — CARVEDILOL 12.5 MG: 12.5 TABLET, FILM COATED ORAL at 09:19

## 2019-01-01 RX ADMIN — BUMETANIDE 1 MG: 0.25 INJECTION INTRAMUSCULAR; INTRAVENOUS at 01:26

## 2019-01-01 RX ADMIN — FERROUS SULFATE TAB 325 MG (65 MG ELEMENTAL FE) 325 MG: 325 (65 FE) TAB at 09:50

## 2019-01-01 RX ADMIN — TAZOBACTAM SODIUM AND PIPERACILLIN SODIUM 3.38 G: 375; 3 INJECTION, SOLUTION INTRAVENOUS at 09:09

## 2019-01-01 RX ADMIN — POTASSIUM CHLORIDE 20 MEQ: 750 CAPSULE, EXTENDED RELEASE ORAL at 08:53

## 2019-01-01 RX ADMIN — FUROSEMIDE 40 MG: 10 INJECTION, SOLUTION INTRAMUSCULAR; INTRAVENOUS at 06:47

## 2019-01-01 RX ADMIN — FOLIC ACID 1 MG: 1 TABLET ORAL at 09:20

## 2019-01-01 RX ADMIN — VANCOMYCIN HYDROCHLORIDE 1250 MG: 10 INJECTION, POWDER, LYOPHILIZED, FOR SOLUTION INTRAVENOUS at 05:52

## 2019-01-01 RX ADMIN — Medication 325 MG: at 08:44

## 2019-01-01 RX ADMIN — SERTRALINE HYDROCHLORIDE 50 MG: 50 TABLET ORAL at 09:10

## 2019-01-01 RX ADMIN — LEVOTHYROXINE SODIUM 50 MCG: 50 TABLET ORAL at 05:22

## 2019-01-01 RX ADMIN — ACETAMINOPHEN 650 MG: 325 TABLET ORAL at 17:58

## 2019-01-01 RX ADMIN — DONEPEZIL HYDROCHLORIDE 5 MG: 5 TABLET ORAL at 20:04

## 2019-01-01 RX ADMIN — IPRATROPIUM BROMIDE AND ALBUTEROL SULFATE 3 ML: 2.5; .5 SOLUTION RESPIRATORY (INHALATION) at 00:38

## 2019-01-01 RX ADMIN — HEPARIN SODIUM 5000 UNITS: 5000 INJECTION INTRAVENOUS; SUBCUTANEOUS at 12:55

## 2019-01-10 PROBLEM — Z95.0 PACEMAKER: Status: ACTIVE | Noted: 2019-01-01

## 2019-01-10 NOTE — PROGRESS NOTES
OFFICE FOLLOW UP     Date of Encounter:01/10/2019     Name: Imelda Maldonado  : 1923  Address: [address] [place] [address]  Home Phone: [phone]    PCP: Leyla Castro DO  [address] [facility] [address]  [facility] [address]    Imelda Maldonado is a 89 y.o. female.      Chief Complaint: Follow up of CHF, PPM interrogation    Problem List:   1. 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   a. Left bundle branch block.  b. History of nonsustained ventricular ectopy.  c. “Normal” coronary arteries by catheterization, 2003, echo ejection fraction of 30%, 2003.  d. Echocardiogram, 2004:  Global hypokinesis, ejection fraction 25%, moderate mitral regurgitation.  e. [employer] BI-V pacemaker implantation by Dr. Naidu on 2010 also with a coronary sinus lead placement.    f. Patient refuses upgrade to an ICD and Coumadin, 2010.    g. Atrial fibrillation with “failed” cardioversion (early recurrence), 2010.  h. Biventricular pacemaker generator change, 10/30/2015.  i. Echocardiogram, 10/1/18 with severe MR and moderate AI, EF=21%-25%.  2. Acute gastrointestinal bleed with hospitalization, 2013 - 2013.  a. Status post 7 units of packed red blood cells.  b. Esophagogastroduodenoscopy (no active bleed) and colonoscopy (active bleeding from flat cecal polyps and two ascending colonic polyps) suggestive of non-steroidal anti-inflammatory drug colopathy or mild ischemia.    c. Positive for Clostridium difficile status post 14 days of antibiotics.  d. Discontinuation of aspirin, no anticoagulation.    3. Arthritis.  a. Reinitiation of Celebrex, 2013.  4. Dyslipidemia.  5. Gout.  6. History of histoplasmosis, OS.  7. History of remote DVT, RLE.  8. Status post thyroidectomy, remote.  a. Chronic replacement therapy.  9. History of intermittent mild CRI.  10. Hypertension.  11. Surgical  "history.  a. Appendectomy.  b. Thyroidectomy.  c. Hysterectomy.  d. Polypectomy.  12. Probable cognitive “issues.”    Allergies:  No Known Allergies    Current Medications:  •  bisacodyl (BISAC-EVAC) 10 MG suppository, Insert 10 mg into the rectum Daily As Needed for constipation.  •  carvedilol (COREG) 12.5 MG tablet, Take 12.5 mg by mouth 2 (Two) Times a Day With Meals  •  diclofenac (VOLTAREN) 1 % gel gel, Apply 4 g topically As Needed.  •  donepezil (ARICEPT) 5 MG tablet, Take 5 mg by mouth Every Night.  •  ferrous sulfate 325 (65 FE) MG tablet, Take 325 mg by mouth Daily With Breakfast  •  folic acid (FOLVITE) 1 MG tablet, Take 1 mg by mouth Daily.  •  furosemide (LASIX) 40 MG tablet, Take 40 mg by mouth Daily.  •  ipratropium-albuterol (DUO-NEB) 0.5-2.5 mg/3 ml nebulizer, Take 3 mL by nebulization Every 4 (Four) Hours As Needed for Wheezing  •  levothyroxine (SYNTHROID, LEVOTHROID) 100 MCG tablet, Take 50 mcg by mouth Daily  •  lisinopril (PRINIVIL,ZESTRIL) 2.5 MG tablet, Take 2.5 mg by mouth Daily  •  Magnesium Hydroxide (MILK OF MAGNESIA PO), Take 30 mL by mouth Daily As Needed.  •  mirtazapine (REMERON) 15 MG tablet, Take 7.5 mg by mouth Every Night.  •  nitroglycerin (NITROSTAT) 0.4 MG SL As Needed for Chest Pain.   •  potassium chloride (K-DUR,KLOR-CON) 20 MEQ CR tablet, Take 20 mEq by mouth Daily.  •  sertraline (ZOLOFT) 25 MG tablet, Take 25 mg by mouth Daily  •  traMADol (ULTRAM) 50 MG tablet, Take 50 mg by mouth Every Night    History of Present Illness:  Imelda remains at her skilled nursing facility.  She \"enjoys\" the company in the food and really has no specific complaints today.  Physical limitations are essentially, those that would be expected in a 89-year-old.              The following portions of the patient's history were reviewed and updated as appropriate: allergies, current medications and problem list.    ROS: Pertinent positives as listed in the HPI.  All other systems reviewed and " "negative.    Objective:    Vitals:    01/10/19 1205   BP: 100/52   BP Location: Right arm   Patient Position: Sitting   Pulse: 72   Weight: 43.5 kg (96 lb)   Height: 162.6 cm (64\")       Physical Exam:  GENERAL: Alert, cooperative, in no acute distress.   HEENT: Normocephalic, no adenopathy, no jugular venous distention  HEART: No discrete PMI is noted. Regular rhythm, normal rate, and no murmurs, gallops, or rubs.   LUNGS: Clear to auscultation bilaterally. No wheezing, rales or ronchi.  ABDOMEN: Soft, bowel sounds present, non-tender   NEUROLOGIC: No focal abnormalities involving strength or sensation are noted.   EXTREMITIES: No clubbing, cyanosis, or edema noted.     Diagnostic Data:    Lab Results   Component Value Date    WBC 4.73 10/02/2018    HGB 10.3 (L) 10/02/2018    HCT 32.5 (L) 10/02/2018    .0 (H) 10/02/2018     (L) 10/02/2018      Lab Results   Component Value Date    GLUCOSE 81 10/02/2018    CALCIUM 6.9 (L) 10/02/2018     10/02/2018    K 3.7 10/02/2018    CO2 27.0 10/02/2018     10/02/2018    BUN 20 10/02/2018    CREATININE 1.46 (H) 10/02/2018    EGFRIFNONA 33 (L) 10/02/2018    BCR 13.7 10/02/2018    ANIONGAP 8.0 10/02/2018    BNP                            1,026                                                               10/02/2018      Procedures  BiV PPM interrogation today: all \"OK\"    Assessment and Plan:   1.  ICM:        I will see Imelda Maldonado back in one year or sooner on an as needed basis.        Scribed for Les Cardozo MD by Jill Colon RN. 01/10/2019 12:55 PM.        EMR Dragon/Transcription Disclaimer:  Much of this encounter note is an electronic transcription/translation of spoken language to printed text.  The electronic translation of spoken language may permit erroneous, or at times, nonsensical words or phrases to be inadvertently transcribed.  Although I have reviewed the note for such errors, some may still exist.             "

## 2019-08-19 PROBLEM — E03.9 HYPOTHYROIDISM (ACQUIRED): Status: ACTIVE | Noted: 2019-01-01

## 2019-08-19 PROBLEM — F03.90 DEMENTIA (HCC): Status: ACTIVE | Noted: 2019-01-01

## 2019-08-19 PROBLEM — R77.8 ELEVATED TROPONIN: Status: ACTIVE | Noted: 2019-01-01

## 2019-08-19 PROBLEM — I50.9 ACUTE ON CHRONIC CONGESTIVE HEART FAILURE (HCC): Status: ACTIVE | Noted: 2019-01-01

## 2019-08-19 PROBLEM — J96.21 ACUTE AND CHRONIC RESPIRATORY FAILURE WITH HYPOXIA (HCC): Status: ACTIVE | Noted: 2018-10-01

## 2019-08-19 NOTE — PROGRESS NOTES
Discharge Planning Assessment  [facility]     Patient Name: Imedla Maldonado  MRN: [id number]  Today's Date: 8/19/2019    Admit Date: 8/19/2019    Discharge Needs Assessment     Row Name 08/19/19 1529       Living Environment    Lives With  facility resident [facility]    Current Living Arrangements  residential facility    Primary Care Provided by  other (see comments) [facility]       Transition Planning    Patient/Family Anticipated Services at Transition  other (see comments) LTC    Transportation Anticipated  other (see comments) Ambulance vs H van transport        Discharge Plan     Row Name 08/19/19 1532       Plan    Plan  Return to [facility] (long term care)    Patient/Family in Agreement with Plan  other (see comments)    Plan Comments  Per Jill White with [facility], patient has a long term care bed hold unsure of length of hold. Per primary nurse, daughter in [place] is POA.  Will continue to follow up as needed.       Final Discharge Disposition Code  04 - intermediate care facility        Destination      No service coordination in this encounter.      Durable Medical Equipment      No service coordination in this encounter.      Dialysis/Infusion      No service coordination in this encounter.      Home Medical Care      No service coordination in this encounter.      Therapy      No service coordination in this encounter.      Community Resources      No service coordination in this encounter.        Expected Discharge Date and Time     Expected Discharge Date Expected Discharge Time    Aug 21, 2019         Demographic Summary    No documentation.       Functional Status     Row Name 08/19/19 1528       Functional Status    Usual Activity Tolerance  poor    Current Activity Tolerance  poor       Functional Status, IADL    Medications  completely dependent    Meal Preparation  completely dependent    Housekeeping  completely dependent    Laundry  completely  dependent    Shopping  completely dependent       Mental Status Summary    Mental Status Comments  Very confused        Psychosocial    No documentation.       Abuse/Neglect    No documentation.       Legal    No documentation.       Substance Abuse    No documentation.       Patient Forms    No documentation.           Aliya Duran RN

## 2019-08-19 NOTE — PLAN OF CARE
Problem: Patient Care Overview  Goal: Plan of Care Review  Outcome: Ongoing (interventions implemented as appropriate)   08/19/19 0910   Coping/Psychosocial   Plan of Care Reviewed With patient;daughter;other (see comments)  (Emilia CARMICHAEL)   Plan of Care Review   Progress no change   OTHER   Outcome Summary WOC nurse consulted to assess left foot wound. Pt has DTPI (POA) left medial distal foot with surrounding blanchable redness; Foam dressing applied. PT Wound Care consulted for MIST therapy. Right distal medial foot with blanchable redness; foam dressing in place. Buttocks skin pink and intact; Z-guard applied. Pt on waffle mattress; black off loading boots in place. [facility] due to high skin risk, moisture, inadequate nutrition, immobility.  See LDA and wound/skin orders. WOC nurse will f/u. Please contact WOc nurse as needed for concerns.

## 2019-08-19 NOTE — ED PROVIDER NOTES
[facility] EMERGENCY DEPARTMENT    eMERGENCY dEPARTMENT eNCOUnter      Pt Name: Imelda Maldonado  MRN: [id number]  YOB: 1923  Date of evaluation: 8/19/2019  Provider: Didier Hurtado DO    CHIEF COMPLAINT       Chief Complaint   Patient presents with   • Shortness of Breath         HISTORY OF PRESENT ILLNESS  (Location/Symptom, Timing/Onset, Context/Setting, Quality, Duration, Modifying Factors, Severity.)   Imelda Maldonado is a 89 y.o. female who presents to the emergency department via EMS with complaints of shortness of breath. The patient was at the nursing home facility where she woke up short of breath. According to nursing staff at the facility, she was in distress but she was not distressed upon EMS arrival. She had O2 saturation in the 80’s which was improved with 4L nasal cannula. She notes she often has intermittent episodes of shortness of breath. She has recently had a cough but denies chest pain. She has a history of congestive heart failure, cardiomyopathy and chronic renal insufficiency. There are no other acute complaints at this time.        Nursing notes were reviewed.    REVIEW OF SYSTEMS    (2-9 systems for level 4, 10 or more for level 5)   ROS:  General:  No fevers, no chills, no weakness  Cardiovascular:  No chest pain, no palpitations  Respiratory: + shortness of breath, + cough, no wheezing  Gastrointestinal:  No pain, no nausea, no vomiting, no diarrhea  Musculoskeletal:  No muscle pain, no joint pain  Skin:  No rash, no easy bruising  Neurologic:  No speech problems, no headache, no extremity numbness, no extremity tingling, no extremity weakness  Psychiatric:  No anxiety  Genitourinary:  No dysuria, no hematuria    Except as noted above the remainder of the review of systems was reviewed and negative.       PAST MEDICAL HISTORY     Past Medical History:   Diagnosis Date   • Acute GI bleeding     12/09/2013 - 12/24/2013. Status post 7 units of packed red  blood cells. Esophagogastroduodenoscopy (no active bleed) and colonoscopy (active bleeding from flat cecal polyps and two ascending colonic polyps) suggestive of non-steroidal anti-inflammatory drug colopathy or mild ischemia. Positive for Clostridium difficile status post 14 days of antibiotics.     • Arthritis     reinitiation of celebrex, October 2013   • CRI (chronic renal insufficiency)     mild   • DVT (deep venous thrombosis) (CMS/HCC)     RLE   • Gout    • Histoplasmosis    • Hyperlipidemia    • Hypertension    • Hypotension     symptoms of dizziness   • Idiopathic cardiomyopathy (CMS/HCC)          SURGICAL HISTORY       Past Surgical History:   Procedure Laterality Date   • APPENDECTOMY     • HYSTERECTOMY     • PACEMAKER IMPLANTATION      change out on 10/30/15 without complication   • POLYPECTOMY     • THYROIDECTOMY      chronic replacement therapy         CURRENT MEDICATIONS       Current Facility-Administered Medications:   •  sodium chloride 0.9 % flush 10 mL, 10 mL, Intravenous, PRN, Didier Hurtado, DO    Current Outpatient Medications:   •  acetaminophen (TYLENOL) 325 MG tablet, Take 650 mg by mouth Every 6 (Six) Hours As Needed for mild pain (1-3)., Disp: , Rfl:   •  acetaminophen (TYLENOL) 500 MG tablet, Take 500 mg by mouth Every 6 (Six) Hours As Needed for mild pain (1-3)., Disp: , Rfl:   •  bisacodyl (BISAC-EVAC) 10 MG suppository, Insert 10 mg into the rectum Daily As Needed for constipation., Disp: , Rfl:   •  carvedilol (COREG) 12.5 MG tablet, Take 12.5 mg by mouth 2 (Two) Times a Day With Meals., Disp: , Rfl:   •  diclofenac (VOLTAREN) 1 % gel gel, Apply 4 g topically As Needed., Disp: , Rfl:   •  donepezil (ARICEPT) 5 MG tablet, Take 5 mg by mouth Every Night., Disp: , Rfl:   •  ferrous sulfate 325 (65 FE) MG tablet, Take 325 mg by mouth Daily With Breakfast., Disp: , Rfl:   •  folic acid (FOLVITE) 1 MG tablet, Take 1 mg by mouth Daily., Disp: , Rfl:   •  furosemide (LASIX) 40 MG  tablet, Take 40 mg by mouth Daily., Disp: , Rfl:   •  ipratropium-albuterol (DUO-NEB) 0.5-2.5 mg/3 ml nebulizer, Take 3 mL by nebulization Every 4 (Four) Hours As Needed for Wheezing., Disp: , Rfl:   •  levothyroxine (SYNTHROID, LEVOTHROID) 100 MCG tablet, Take 50 mcg by mouth Daily., Disp: , Rfl:   •  lisinopril (PRINIVIL,ZESTRIL) 2.5 MG tablet, Take 2.5 mg by mouth Daily., Disp: , Rfl:   •  Magnesium Hydroxide (MILK OF MAGNESIA PO), Take 30 mL by mouth Daily As Needed., Disp: , Rfl:   •  mirtazapine (REMERON) 15 MG tablet, Take 7.5 mg by mouth Every Night., Disp: , Rfl:   •  nitroglycerin (NITROSTAT) 0.4 MG SL tablet, Place 0.4 mg under the tongue Every 5 (Five) Minutes As Needed for Chest Pain. Take no more than 3 doses in 15 minutes., Disp: , Rfl:   •  potassium chloride (K-DUR,KLOR-CON) 20 MEQ CR tablet, Take 20 mEq by mouth Daily., Disp: , Rfl:   •  sertraline (ZOLOFT) 25 MG tablet, Take 25 mg by mouth Daily., Disp: , Rfl:   •  traMADol (ULTRAM) 50 MG tablet, Take 50 mg by mouth Every Night., Disp: , Rfl:     ALLERGIES     Patient has no known allergies.    FAMILY HISTORY       Family History   Problem Relation Age of Onset   • Arthritis Other    • Diabetes Other    • Heart disease Other    • Hypertension Other    • Thyroid disease Other           SOCIAL HISTORY       Social History     Socioeconomic History   • Marital status:      Spouse name: Not on file   • Number of children: Not on file   • Years of education: Not on file   • Highest education level: Not on file   Tobacco Use   • Smoking status: Former Smoker     Types: Cigarettes   • Smokeless tobacco: Never Used   Substance and Sexual Activity   • Drug use: No   • Sexual activity: Defer         PHYSICAL EXAM    (up to 7 for level 4, 8 or more for level 5)     Vitals:    08/19/19 0151   BP: 148/61   BP Location: Left arm   Patient Position: Lying   Pulse: 74   Resp: 20   Temp: 97.6 °F (36.4 °C)   TempSrc: Oral   SpO2: 93%   Weight: 43.5 kg (95 lb  "14.4 oz)   Height: 162.6 cm (64\")       Physical Exam  General : Patient an elderly frail female who answers questions appropriately. She is awake, alert, oriented, in no acute distress, nontoxic appearing.  HEENT: Pupils are equally round and reactive to light, EOMI, conjunctivae clear, sclerae white, there is no injection no icterus.  Oral mucosa is moist, no exudate. Uvula is midline  Neck: Neck is supple, full range of motion, trachea midline  Cardiac: Heart regular rate, rhythm, no murmurs, rubs, or gallops  Lungs: She is in no acute respiratory distress. There are scattered rhonchi diffusely throughout the lung fields. There is no wheezing or rales. There is no use of accessory muscles.  Abdomen: Abdomen is soft, nontender, nondistended. There is no firm or pulsatile masses, no rebound rigidity or guarding.   Musculoskeletal: 5 out of 5 strength in all 4 extremities.  No focal muscle deficits are appreciated. 1+ edema in bilateral lower extremities.   Neuro: Motor intact, sensory intact, level of consciousness is normal, cerebellar function is normal, reflexes are grossly normal.  Dermatology: Skin is warm and dry. There is ecchymosis on the left medial knee. On the extremities, she has areas of bruising in different stages of healing.    Psych: Mentation is grossly normal, cognition is grossly normal. Affect is appropriate.      DIAGNOSTIC RESULTS     EKG: All EKG's are interpreted by the Emergency Department Physician who either signs or Co-signs this chart in the absence of a cardiologist.    ECG 12 Lead             RADIOLOGY:   Non-plain film images such as CT, Ultrasound and MRI are read by the radiologist. Plain radiographic images are visualized and preliminarily interpreted by the emergency physician with the below findings:      [] Radiologist's Report Reviewed:  XR Chest 1 View   Final Result   Cardiomegaly with prominent bilateral vascular congestive changes which show interval worsening when " compared to the prior study of 10/1/2018.      Signer Name: Lemuel Pinon MD    Signed: 8/19/2019 2:24 AM    Workstation Name: [email or web address]     [facility]            ED BEDSIDE ULTRASOUND:   Performed by ED Physician - none    LABS:    I have reviewed and interpreted all of the currently available lab results from this visit (if applicable):  Results for orders placed or performed during the hospital encounter of 08/19/19   Comprehensive Metabolic Panel   Result Value Ref Range    Glucose 162 (H) 65 - 99 mg/dL    BUN 20 8 - 23 mg/dL    Creatinine 1.17 (H) 0.57 - 1.00 mg/dL    Sodium 139 136 - 145 mmol/L    Potassium 5.0 3.5 - 5.2 mmol/L    Chloride 103 98 - 107 mmol/L    CO2 24.0 22.0 - 29.0 mmol/L    Calcium 7.6 (L) 8.2 - 9.6 mg/dL    Total Protein 7.7 6.0 - 8.5 g/dL    Albumin 3.30 (L) 3.50 - 5.20 g/dL    ALT (SGPT) 10 1 - 33 U/L    AST (SGOT) 34 (H) 1 - 32 U/L    Alkaline Phosphatase 114 39 - 117 U/L    Total Bilirubin 0.4 0.2 - 1.2 mg/dL    eGFR Non African Amer 43 (L) >60 mL/min/1.73    Globulin 4.4 gm/dL    A/G Ratio 0.8 g/dL    BUN/Creatinine Ratio 17.1 7.0 - 25.0    Anion Gap 12.0 5.0 - 15.0 mmol/L   BNP   Result Value Ref Range    proBNP 10,020.0 (H) 5.0-1,800.0 pg/mL   Troponin   Result Value Ref Range    Troponin T 0.033 (C) 0.000 - 0.030 ng/mL   CBC Auto Differential   Result Value Ref Range    WBC 6.46 3.40 - 10.80 10*3/mm3    RBC 2.93 (L) 3.77 - 5.28 10*6/mm3    Hemoglobin 9.2 (L) 12.0 - 15.9 g/dL    Hematocrit 31.6 (L) 34.0 - 46.6 %    .8 (H) 79.0 - 97.0 fL    MCH 31.4 26.6 - 33.0 pg    MCHC 29.1 (L) 31.5 - 35.7 g/dL    RDW 17.2 (H) 12.3 - 15.4 %    RDW-SD 67.7 (H) 37.0 - 54.0 fl    MPV 8.9 6.0 - 12.0 fL    Platelets 192 140 - 450 10*3/mm3    Neutrophil % 80.3 (H) 42.7 - 76.0 %    Lymphocyte % 9.4 (L) 19.6 - 45.3 %    Monocyte % 8.2 5.0 - 12.0 %    Eosinophil % 1.5 0.3 - 6.2 %    Basophil % 0.3 0.0 - 1.5 %    Immature Grans % 0.3 0.0 - 0.5 %    Neutrophils, Absolute  "5.18 1.70 - 7.00 10*3/mm3    Lymphocytes, Absolute 0.61 (L) 0.70 - 3.10 10*3/mm3    Monocytes, Absolute 0.53 0.10 - 0.90 10*3/mm3    Eosinophils, Absolute 0.10 0.00 - 0.40 10*3/mm3    Basophils, Absolute 0.02 0.00 - 0.20 10*3/mm3    Immature Grans, Absolute 0.02 0.00 - 0.05 10*3/mm3    nRBC 0.0 0.0 - 0.2 /100 WBC        All other labs were within normal range or not returned as of this dictation.      EMERGENCY DEPARTMENT COURSE and DIFFERENTIAL DIAGNOSIS/MDM:   Vitals:    Vitals:    08/19/19 0151   BP: 148/61   BP Location: Left arm   Patient Position: Lying   Pulse: 74   Resp: 20   Temp: 97.6 °F (36.4 °C)   TempSrc: Oral   SpO2: 93%   Weight: 43.5 kg (95 lb 14.4 oz)   Height: 162.6 cm (64\")         Patient with acute on chronic CHF, underlying increase in her symptoms, mild edema.  Is requiring extra oxygen due to hypoxia at her nursing facility.  On arrival she is alert, pleasant, no significant respiratory distress, we did give her Lasix 60 mg IV, her BNP is greater than 10,000, she has scattered rhonchi on her physical exam, chest x-ray with cardiomegaly with increased interstitial markings.  With her acute CHF exacerbation and hypoxia we discussed admission to the hospital for further work-up.  Case discussed with our hospitalist team for admission.          MEDICATIONS ADMINISTERED IN ED:  Medications   sodium chloride 0.9 % flush 10 mL (not administered)   furosemide (LASIX) injection 60 mg (60 mg Intravenous Given 8/19/19 0247)       PROCEDURES:  Procedures    CRITICAL CARE TIME    Total Critical Care time was 0 minutes, excluding separately reportable procedures.   There was a high probability of clinically significant/life threatening deterioration in the patient's condition which required my urgent intervention.      FINAL IMPRESSION      1. Acute on chronic congestive heart failure, unspecified heart failure type (CMS/HCC)    2. Hypoxia          DISPOSITION/PLAN     ED Disposition     ED Disposition " Condition Comment    Decision to Admit  Level of Care: Telemetry [5]   Admitting Physician: FESTUS VALDEZ [[id number]]            PATIENT REFERRED TO:  No follow-up provider specified.    DISCHARGE MEDICATIONS:     Medication List      ASK your doctor about these medications    * acetaminophen 325 MG tablet  Commonly known as:  TYLENOL     * acetaminophen 500 MG tablet  Commonly known as:  TYLENOL     BISAC-EVAC 10 MG suppository  Generic drug:  bisacodyl     carvedilol 12.5 MG tablet  Commonly known as:  COREG     diclofenac 1 % gel gel  Commonly known as:  VOLTAREN     donepezil 5 MG tablet  Commonly known as:  ARICEPT     ferrous sulfate 325 (65 FE) MG tablet     folic acid 1 MG tablet  Commonly known as:  FOLVITE     furosemide 40 MG tablet  Commonly known as:  LASIX     ipratropium-albuterol 0.5-2.5 mg/3 ml nebulizer  Commonly known as:  DUO-NEB     levothyroxine 100 MCG tablet  Commonly known as:  SYNTHROID, LEVOTHROID     lisinopril 2.5 MG tablet  Commonly known as:  PRINIVIL,ZESTRIL     MILK OF MAGNESIA PO     mirtazapine 15 MG tablet  Commonly known as:  REMERON     nitroglycerin 0.4 MG SL tablet  Commonly known as:  NITROSTAT     potassium chloride 20 MEQ CR tablet  Commonly known as:  K-DUR,KLOR-CON     sertraline 25 MG tablet  Commonly known as:  ZOLOFT     traMADol 50 MG tablet  Commonly known as:  ULTRAM         * This list has 2 medication(s) that are the same as other medications   prescribed for you. Read the directions carefully, and ask your doctor or   other care provider to review them with you.                Documentation assistance provided by Jairon Byrne acting as scribe for Dr. Didier Hurtado.     The scribe's documentation has been prepared under my direction and personally reviewed by me in its entirety.  I confirm that the note above accurately reflects all work, treatment, procedures, and medical decision making performed by me.      Comment: Please note this report has been produced  using speech recognition software.      Didier Hurtado DO  Attending Emergency Physician                 Jairon Byrne  08/19/19 0244       Didier Hurtado DO  08/19/19 0304

## 2019-08-19 NOTE — CONSULTS
[facility]  CARDIOLOGY CONSULTATION NOTE    Imelda Maldonado  : 1923  MRN:[id number]    Date of Admission:2019  Date of Consultation: 19    PCP: Leyla Castro DO    IDENTIFICATION: A 89 y.o. female resident of [facility]     Chief Complaint   Patient presents with   • Shortness of Breath     PROBLEM LIST:   1. 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   a. Left bundle branch block.  b. History of nonsustained ventricular ectopy.  c. “Normal” coronary arteries by catheterization, 2003, echo ejection fraction of 30%, 2003.  d. Echocardiogram, 2004:  Global hypokinesis, ejection fraction 25%, moderate mitral regurgitation.  e. D[employer]-V[employer]ation[employer]ures BI-V pacemaker implantation by Dr. Naidu on 2010 also with a coronary sinus lead placement.    f. Patient refuses upgrade to an ICD and Coumadin, 2010.    g. Atrial fibrillation with “failed” cardioversion (early recurrence), 2010.  h. Biventricular pacemaker generator change, 10/30/2015.  i. Echocardiogram, 10/1/18 with severe MR and moderate AI, EF=21%-25%.  j. Acute on chronic SHF 2019   2. Acute gastrointestinal bleed with hospitalization, 2013 - 2013.  a. Status post 7 units of packed red blood cells.  b. Esophagogastroduodenoscopy (no active bleed) and colonoscopy (active bleeding from flat cecal polyps and two ascending colonic polyps) suggestive of non-steroidal anti-inflammatory drug colopathy or mild ischemia.    c. Positive for Clostridium difficile status post 14 days of antibiotics.  d. Discontinuation of aspirin, no anticoagulation.    3. Arthritis.  a. Reinitiation of Celebrex, 2013.  4. Dyslipidemia.  5. Gout.  6. History of histoplasmosis, OS.  7. History of remote DVT, RLE.  8. Status post thyroidectomy, remote.  a. Chronic replacement therapy.  9. History of intermittent mild  CRI.  10. Hypertension.  11. Surgical history.  a. Appendectomy.  b. Thyroidectomy.  c. Hysterectomy.  d. Polypectomy.  12. Dementia   13. DNR code     ALLERGIES: No Known Allergies    HPI: Mrs. Maldonado is a pleasant 89 y/o WF with above noted history who is seen in consultation today for acute/chronic SHF. She lives in a local nursing home, and has dementia and is a very poor historian. History is therefore obtained from chart review. She was brought to our ER from her nursing home due to complaints of acute dyspnea and hypoxia with O2 sats in the 80's. These improved with supplemental O2. On arrival to the ER her CXR and proBNP were consistent with pulmonary edema. She was given IV Lasix 60mg and currently appears comfortable. She reports she uses supplemental O2 as needed. She is unsure if she is taking Lasix, and there is no family present. She reports sharp atypical chest pain that occurs daily. She denies syncope or pre-syncope. Her last know EF was 21-25% with severe MR on echocardiographic study in October 2018. She is a DNR code.       ROS: All systems have been reviewed and are negative with the exception of those mentioned in the HPI and problem list above.    Surgical History:   Past Surgical History:   Procedure Laterality Date   • APPENDECTOMY     • HYSTERECTOMY     • PACEMAKER IMPLANTATION      change out on 10/30/15 without complication   • POLYPECTOMY     • THYROIDECTOMY      chronic replacement therapy     Social History:   Social History     Socioeconomic History   • Marital status:    Tobacco Use   • Smoking status: Former Smoker     Types: Cigarettes   • Smokeless tobacco: Never Used   Substance and Sexual Activity   • Drug use: No   • Sexual activity: Defer       Family History:   Family History   Problem Relation Age of Onset   • Arthritis Other    • Diabetes Other    • Heart disease Other    • Hypertension Other    • Thyroid disease Other        Objective     /59 (BP Location:  "Right arm, Patient Position: Lying)   Pulse 79   Temp 98.1 °F (36.7 °C) (Oral)   Resp 20   Ht 162.6 cm (64\")   Wt 50.3 kg (111 lb)   LMP  (LMP Unknown)   SpO2 91%   BMI 19.05 kg/m²     Intake/Output Summary (Last 24 hours) at 8/19/2019 1031  Last data filed at 8/19/2019 0657  Gross per 24 hour   Intake --   Output 700 ml   Net -700 ml       PHYSICAL EXAM:  CONSTITUTIONAL: Elderly, frail, cooperative, in no acute distress  HEENT: Normocephalic, atraumatic, PERRLA, no JVD  CARDIOVASCULAR:  Regular rhythm and normal rate, grade 2/6 murmur; no gallop or rub.   RESPIRATORY: Coarse lung sounds with wheezing and ronchi. Normal respiratory effort, on 3L NC  GI: Soft, nontender, normal bowel sounds  MUSCULOSKELETAL: No gross deformities, no edema  SKIN: Warm, dry. No bleeding, bruising or rash  NEUROLOGICAL: No focal deficits  PSYCHIATRIC: Normal mood and affect. Behavior is normal     Labs/Diagnostic Data  Results from last 7 days   Lab Units 08/19/19  0201   SODIUM mmol/L 139   POTASSIUM mmol/L 5.0   CHLORIDE mmol/L 103   CO2 mmol/L 24.0   BUN mg/dL 20   CREATININE mg/dL 1.17*   GLUCOSE mg/dL 162*   CALCIUM mg/dL 7.6*     Results from last 7 days   Lab Units 08/19/19  0503 08/19/19  0201   TROPONIN T ng/mL 0.039* 0.033*     Results from last 7 days   Lab Units 08/19/19  0200   WBC 10*3/mm3 6.46   HEMOGLOBIN g/dL 9.2*   HEMATOCRIT % 31.6*   PLATELETS 10*3/mm3 192         Results from last 7 days   Lab Units 08/19/19  0201   PROBNP pg/mL 10,020.0*       I personally reviewed the patient's EKG/Telemetry data    Radiology Data:   CXR  8/19/19:     IMPRESSION:  Cardiomegaly with prominent bilateral vascular congestive changes which show interval worsening when compared to the prior study of 10/1/2018.    Current Medications:      carvedilol 12.5 mg Oral BID With Meals   donepezil 5 mg Oral Nightly   ferrous sulfate 325 mg Oral Daily With Breakfast   folic acid 1 mg Oral Daily   heparin (porcine) 5,000 Units Subcutaneous " Q8H   levothyroxine 50 mcg Oral Daily   lisinopril 2.5 mg Oral Daily   mirtazapine 7.5 mg Oral Nightly   potassium chloride 20 mEq Oral Daily With Breakfast   sertraline 50 mg Oral Daily   sodium chloride 3 mL Intravenous Q12H          Assessment and Plan:     1. Acute on chronic SHF/Idiopathic CM  - proBNP and CXR with worsening pulmonary edema  - echo is pending  - start IV Bumex 1mg BID and add Metolazone 5mg once with daily BMPs   - discontinue Lisinopril and plan to start low dose Entresto in 48 hours  - continue Coreg  - defer MPS/LHC     2. Dementia and poor historian     3. Renal insufficiency   - pay close attention with diuresis   - daily renal function panel    4. Anemia  - recommend work-up and treatment: would consider IV Fe if persistent anemia despite oral iron     5. History of atrial fibrillation  - not previously anticoagulated due to severe GI bleed  in 2013  - will obtain device interrogation and if significant Afib burden will consider trial of Apixaban 2.5mg BID    6. DNR code    Scribed for Noel Muñoz MD by Danna Rodriguez PA-C. 8/19/2019  10:31 AM    Thank you for allowing me to participate in the care of Imelda Maldonado. Feel free to contact me directly with any further questions or concerns.    I have seen and examined the patient, case was discussed with the physician extender, reviewed the above note, necessary changes were made and I agree with the final note.     Noel Muñoz MD [facility]

## 2019-08-19 NOTE — H&P
[facility] Medicine Services  HISTORY AND PHYSICAL    Patient Name: Imelda Maldonado  : 1923  MRN: [id number]  Primary Care Physician: Leyla Castro DO  Date of admission: 2019      Subjective   Subjective     Chief Complaint:  Shortness of breath     HPI:  Imelda Maldonado is a 89 y.o. female with PMH significant for chronic systolic heart failure, BiV-ICD, CAD, CKD 3, Afib, DVT, GI bleed, and dementia who presents to the ED with complaint of shortness of breath.  She currently is a resident at [facility].  Per ED report, the SNF reported that she woke up tonight short of breath.  Upon EMS arrival she had oxygen saturation in the 80s which improved with increase of supplemental oxygen to 4L nasal cannula.  She states that she uses oxygen intermittently when she gets short of breath.  She also notes cough that has been ongoing and not productive.  Patient does have history of dementia and is a poor historian.  She currently denies any chest pain.  Upon arrival to the ED, she remains on supplemental oxygen at 4L.  She is in no significant respiratory distress.  Her BNP is found to be elevated as well as CXR is concerning for cardiomegaly with increased interstitial markings.  She was given Lasix 60 mg IV while in the ED.  She will be admitted to hospital medicine for further evaluation.      Review of Systems   Unable to perform ROS: Dementia      All other systems reviewed and are negative.     Personal History     Past Medical History:   Diagnosis Date   • Acute GI bleeding     2013 - 2013. Status post 7 units of packed red blood cells. Esophagogastroduodenoscopy (no active bleed) and colonoscopy (active bleeding from flat cecal polyps and two ascending colonic polyps) suggestive of non-steroidal anti-inflammatory drug colopathy or mild ischemia. Positive for Clostridium difficile status post 14 days of antibiotics.     • Arthritis     reinitiation of  celebrex, October 2013   • CRI (chronic renal insufficiency)     mild   • DVT (deep venous thrombosis) (CMS/HCC)     RLE   • Gout    • Histoplasmosis    • Hyperlipidemia    • Hypertension    • Hypotension     symptoms of dizziness   • Idiopathic cardiomyopathy (CMS/HCC)        Past Surgical History:   Procedure Laterality Date   • APPENDECTOMY     • HYSTERECTOMY     • PACEMAKER IMPLANTATION      change out on 10/30/15 without complication   • POLYPECTOMY     • THYROIDECTOMY      chronic replacement therapy       Family History: family history includes Arthritis in her other; Diabetes in her other; Heart disease in her other; Hypertension in her other; Thyroid disease in her other. Otherwise pertinent FHx was reviewed and unremarkable.     Social History:  reports that she has quit smoking. Her smoking use included cigarettes. She has never used smokeless tobacco. She reports that she does not use drugs.  Social History     Social History Narrative   • Not on file       Medications:    Available home medication information reviewed.  Medications Prior to Admission   Medication Sig Dispense Refill Last Dose   • acetaminophen (TYLENOL) 325 MG tablet Take 650 mg by mouth Every 6 (Six) Hours As Needed for mild pain (1-3).   Taking   • acetaminophen (TYLENOL) 500 MG tablet Take 500 mg by mouth Every 6 (Six) Hours As Needed for mild pain (1-3).   Taking   • bisacodyl (BISAC-EVAC) 10 MG suppository Insert 10 mg into the rectum Daily As Needed for constipation.   Taking   • carvedilol (COREG) 12.5 MG tablet Take 12.5 mg by mouth 2 (Two) Times a Day With Meals.   Taking   • diclofenac (VOLTAREN) 1 % gel gel Apply 4 g topically As Needed.   Taking   • donepezil (ARICEPT) 5 MG tablet Take 5 mg by mouth Every Night.   Taking   • ferrous sulfate 325 (65 FE) MG tablet Take 325 mg by mouth Daily With Breakfast.   Taking   • folic acid (FOLVITE) 1 MG tablet Take 1 mg by mouth Daily.   Taking   • furosemide (LASIX) 40 MG tablet Take  40 mg by mouth Daily.   Taking   • ipratropium-albuterol (DUO-NEB) 0.5-2.5 mg/3 ml nebulizer Take 3 mL by nebulization Every 4 (Four) Hours As Needed for Wheezing.   Taking   • levothyroxine (SYNTHROID, LEVOTHROID) 100 MCG tablet Take 50 mcg by mouth Daily.   Taking   • lisinopril (PRINIVIL,ZESTRIL) 2.5 MG tablet Take 2.5 mg by mouth Daily.   Taking   • Magnesium Hydroxide (MILK OF MAGNESIA PO) Take 30 mL by mouth Daily As Needed.   Taking   • mirtazapine (REMERON) 15 MG tablet Take 7.5 mg by mouth Every Night.   Taking   • nitroglycerin (NITROSTAT) 0.4 MG SL tablet Place 0.4 mg under the tongue Every 5 (Five) Minutes As Needed for Chest Pain. Take no more than 3 doses in 15 minutes.   Taking   • potassium chloride (K-DUR,KLOR-CON) 20 MEQ CR tablet Take 20 mEq by mouth Daily.   Taking   • sertraline (ZOLOFT) 25 MG tablet Take 50 mg by mouth Daily.   Taking   • traMADol (ULTRAM) 50 MG tablet Take 50 mg by mouth Every Night.   Taking       No Known Allergies    Objective   Objective     Vital Signs:   Temp:  [97.6 °F (36.4 °C)] 97.6 °F (36.4 °C)  Heart Rate:  [74-75] 74  Resp:  [20] 20  BP: (114-155)/(61-78) 155/74        Physical Exam   Constitutional: She appears well-developed. No distress.   Thin limbs   HENT:   Head: Normocephalic and atraumatic.   Eyes: Pupils are equal, round, and reactive to light.   Neck: Normal range of motion. Neck supple. No JVD present.   Cardiovascular: Normal rate, regular rhythm, normal heart sounds and intact distal pulses. Exam reveals no gallop and no friction rub.   No murmur heard.  Pulmonary/Chest: Effort normal. No respiratory distress. She has wheezes. She has rales.   Scattered rales throughout.  Mild expiratory wheeze right upper lobe   Abdominal: Soft. Bowel sounds are normal. She exhibits no distension. There is no tenderness. There is no guarding.   Musculoskeletal: Normal range of motion. She exhibits edema. She exhibits no tenderness.   Neurological: She is alert.    Oriented to self only.  Believes that she is in [place] and was unable to recall her age.  Unsure of sequence of events that occurred for her to arrive at the hospital.   Skin: Skin is warm and dry. Capillary refill takes less than 2 seconds. No erythema. No pallor.   Psychiatric: She has a normal mood and affect. Her behavior is normal.   Vitals reviewed.      Results Reviewed:  I have personally reviewed current lab and radiology data.    Results from last 7 days   Lab Units 08/19/19  0200   WBC 10*3/mm3 6.46   HEMOGLOBIN g/dL 9.2*   HEMATOCRIT % 31.6*   PLATELETS 10*3/mm3 192     Results from last 7 days   Lab Units 08/19/19  0201   SODIUM mmol/L 139   POTASSIUM mmol/L 5.0   CHLORIDE mmol/L 103   CO2 mmol/L 24.0   BUN mg/dL 20   CREATININE mg/dL 1.17*   GLUCOSE mg/dL 162*   CALCIUM mg/dL 7.6*   ALT (SGPT) U/L 10   AST (SGOT) U/L 34*   TROPONIN T ng/mL 0.033*   PROBNP pg/mL 10,020.0*     Estimated Creatinine Clearance: 19.3 mL/min (A) (by C-G formula based on SCr of 1.17 mg/dL (H)).  Brief Urine Lab Results  (Last result in the past 365 days)      Color   Clarity   Blood   Leuk Est   Nitrite   Protein   CREAT   Urine HCG        10/01/18 0159 Yellow Clear Negative Trace Negative 30 mg/dL (1+)             Imaging Results (last 24 hours)     Procedure Component Value Units Date/Time    XR Chest 1 View [[id number]] Collected:  08/19/19 0224     Updated:  08/19/19 0226    Narrative:       CR Chest 1 Vw    INDICATION:   Shortness of breath protocol     COMPARISON:    10/1/2018    FINDINGS:  Single portable AP view(s) of the chest.  The heart size is enlarged. The aorta is densely calcified and ectatic. Dual-lead pacing device overlies the left chest.    There is marked prominence of the pulmonary vascularity centrally. The bronchovascular markings are significantly increased in both lung fields. There may be pleural fluid on the left side. No pneumothorax.      Impression:       Cardiomegaly with prominent bilateral  vascular congestive changes which show interval worsening when compared to the prior study of 10/1/2018.    Signer Name: Lemuel Pinon MD   Signed: 8/19/2019 2:24 AM   Workstation Name: [id number]    [facility]        Results for orders placed during the hospital encounter of 10/01/18   Adult Transthoracic Echo Complete W/ Cont if Necessary Per Protocol    Narrative · The left ventricular cavity is moderately dilated.  · Left atrial cavity size is moderate-to-severely dilated.  · Mild pulmonic valve regurgitation is present.  · Moderate aortic valve regurgitation is present.  · Severe mitral valve regurgitation is present  · Mild tricuspid valve regurgitation is present.  · Mildly reduced right ventricular systolic function noted.  · Calculated right ventricular systolic pressure from tricuspid   regurgitation is 35 mmHg.          Assessment/Plan   Assessment / Plan     Active Hospital Problems    Diagnosis POA   • **Acute on chronic congestive heart failure (CMS/HCC) [I50.9] Yes   • Hypothyroidism (acquired) [E03.9] Yes   • Dementia [F03.90] Yes   • Elevated troponin [R74.8] Yes   • CKD (chronic kidney disease) stage 3, GFR 30-59 ml/min (CMS/HCC) [N18.3] Yes   • Acute and chronic respiratory failure with hypoxia (CMS/HCC) [J96.21] Yes   • Hyperlipidemia [E78.5] Yes   • Idiopathic cardiomyopathy (CMS/HCC) [I42.8] Yes     1. 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   a. Left bundle branch block.  b. History of nonsustained ventricular ectopy.  c. “Normal” coronary arteries by catheterization, October 2003, echo ejection fraction of 30%, October 2003.  d. Echocardiogram, 01/27/2004:  Global hypokinesis, ejection fraction 25%, moderate mitral regurgitation.  e. [employer] BI-V pacemaker implantation by Dr. Naidu on 04/16/2010 also with a coronary sinus lead placement.    f. Patient refuses upgrade to an ICD and Coumadin, March 2010.    g. Atrial  fibrillation with “failed” cardioversion (early recurrence), January 2010.  h. Biventricular pacemaker generator change, 10/30/2015.         • Essential hypertension [I10] Yes     89-year-old female presenting to the ED with complaint of shortness of breath who was found to have concern for acute on chronic CHF with acute on chronic respiratory failure.    Acute on chronic CHF  - Most recent echo showing EF of 20% - 21%  -Echo in a.m.  - Consider cardiology consult  - Lasix 60 mg IV given while in the ED, will defer to daytime rounding physician for additional dosing as she received initial dose at 2:47 AM  - Normally on Lasix 40 mg p.o. daily, holding p.o. dosing for now  - Monitor electrolytes  - Daily weight  - Heart failure navigator    Acute on chronic respiratory failure  - Likely secondary to CHF  -PRN DuoNeb  - Wean oxygen, past notes show normal use of 2L supplemental oxygen    Elevated troponin  -Denies chest pain  -Likely secondary to above  -Trend troponin  -Trend EKG    Idiopathic cardiomyopathy  - S/P Bi-V pacemaker implantation    Hypertension  - Continue home dose of carvedilol and lisinopril    Dementia  -Continue Aricept    Hypothyroid  -Continue home dose of levothyroxine    CKD 3  - Appears to be at baseline  - Continue to monitor    Anemia  - Slightly lower than previous trend  - Denies blood in stool, but patient is poor historian  - Iron studies, occult stool      DVT prophylaxis: Heparin    CODE STATUS:    Code Status and Medical Interventions:   Ordered at: 08/19/19 0453     Limited Support to NOT Include:    Intubation     Code Status:    No CPR     Medical Interventions (Level of Support Prior to Arrest):    Limited       Admission Status: This patient is admitted as inpatient secondary to severe acute CHF exacerbation with last known EF 20% and need for IV medication (Lasix) as well as inpatient cardiology consultation.  She also has acute on chronic respiratory failure with  hypoxia.      Electronically signed by ALEX Betancourt, 08/19/19, 4:23 AM.      Brief Attending Admission Attestation     I have seen and examined the patient, performing an independent face-to-face diagnostic evaluation with plan of care reviewed and developed with the advanced practice clinician (APC).      Brief Summary Statement:   Imelda Maldonado is a 89 y.o. female who presented with shortness of air was found to be in acute CHF failure.  She also has acute on chronic hypoxic respiratory failure.  She reports increasing shortness of air as well as functional decline and debility.  She presented from a nursing home.  There are no alleviating factors.  Her symptoms are exacerbated by exertion.  Remainder of detailed HPI is as noted above and has been reviewed and/or edited by me for completeness.      Attending Physical Exam:  Constitutional: No acute distress, awake, alert, nontoxic, thin body habitus, frail  Eyes: Pupils equal, sclerae anicteric, no conjunctival injection  HENT: NCAT, mucous membranes moist  Neck: Supple, no thyromegaly, no lymphadenopathy  Respiratory: mildly labored respirations, faint crackles, no wheezing  Cardiovascular: RRR  Gastrointestinal: Positive bowel sounds, soft, nontender, nondistended  Musculoskeletal: Extremities are tender to touch bilaterally she also has osteoarthritis and swollen joints consistent with age-related changes, she appears unable to ambulate  Psychiatric: cooperative  Neurologic: Oriented x 3, movements symmetric BUE and BLE, Cranial Nerves grossly intact to confrontation, speech clear and fluent  Skin: No rashes, no jaundice, no petechiae, no mottling     Brief Assessment/Plan :    Acute severe CHF exacerbation with acute on chronic hypoxic respiratory failure.  Will admit for treatment.  Cardiology consult requested.  IV Lasix for diuresis.  See above for further detailed assessment and plan developed with APC which I have reviewed and/or edited for  completeness.      Electronically signed by Quyen Gordon MD, 08/19/19, 2:38 PM.

## 2019-08-19 NOTE — PLAN OF CARE
Problem: Patient Care Overview  Goal: Plan of Care Review  Outcome: Ongoing (interventions implemented as appropriate)   08/19/19 0628   Coping/Psychosocial   Plan of Care Reviewed With patient   Plan of Care Review   Progress no change   OTHER   Outcome Summary VSS. pt resting well. WOC consult put in for area on outer part of left foot at bony prominence. No complaints per patient. Called in critical troponin this morning. Will montior.        Problem: Fall Risk (Adult)  Goal: Absence of Fall  Outcome: Outcome(s) achieved Date Met: 08/19/19 08/19/19 0628   Fall Risk (Adult)   Absence of Fall making progress toward outcome       Problem: Pain, Chronic (Adult)  Goal: Acceptable Pain/Comfort Level and Functional Ability  Outcome: Ongoing (interventions implemented as appropriate)   08/19/19 0628   Pain, Chronic (Adult)   Acceptable Pain/Comfort Level and Functional Ability making progress toward outcome       Problem: Skin Injury Risk (Adult)  Goal: Skin Health and Integrity  Outcome: Ongoing (interventions implemented as appropriate)   08/19/19 0628   Skin Injury Risk (Adult)   Skin Health and Integrity making progress toward outcome

## 2019-08-20 NOTE — PROGRESS NOTES
Imelda Maldonado  [id number]  4/29/1923   LOS: 1 day   Patient Care Team:  Leyla Castro DO as PCP - General  Les Cardozo MD as PCP - Claims Attributed    Chief Complaint:  SOB / WEAKNESS / CONFUSION / CHF / NICM    Subjective     Comfortable in left lateral decubitus position mostly flat in bed on supplemental oxygen 2 L/min with acceptable oximetry (96%).  Minimal verbalization.  Oriented to person only.  Lethargic but denies anginal type chest discomfort, increased tachypnea/dyspnea, nausea, emesis, palpitation, headache, or focal motor-sensory changes.    Objective     Vital Sign Min/Max for last 24 hours  Temp  Min: 97.3 °F (36.3 °C)  Max: 98.2 °F (36.8 °C)   BP  Min: 107/48  Max: 148/66   Pulse  Min: 74  Max: 87   Resp  Min: 20  Max: 20   SpO2  Min: 91 %  Max: 94 %      Weight  Min: 50.3 kg (111 lb)  Max: 50.3 kg (111 lb)         08/19/19  1610 08/20/19  0524   Weight: 50.3 kg (111 lb) (Tried to have pt stand on scale she was weak and dizzy)         Intake/Output Summary (Last 24 hours) at 8/20/2019 0724  Last data filed at 8/20/2019 0500  Gross per 24 hour   Intake --   Output 2800 ml   Net -2800 ml       Physical Exam:     General Appearance:    Alert, cooperative, in no acute distress   Lungs:    Bibasilar crackles with overall diminished breath sounds,respirations regular, even and unlabored    Heart:    Regular and normal rate, normal S1 and S2, grade 1/6            murmur, no gallop, no rub, no click   Abdomen:  Extremities:   Soft, non-tender, bowel sounds audible x4    No edema, normal range of motion   Pulses:   Pulses palpable and equal bilaterally      Results Review:   Results from last 7 days   Lab Units 08/20/19  0334 08/19/19  0201   SODIUM mmol/L 140 139   POTASSIUM mmol/L 4.2 5.0   CHLORIDE mmol/L 99 103   CO2 mmol/L 29.0 24.0   BUN mg/dL 25* 20   CREATININE mg/dL 1.18* 1.17*   GLUCOSE mg/dL 79 162*   CALCIUM mg/dL 7.4* 7.6*     Results from last 7 days   Lab Units 08/20/19  6777  08/19/19  0200   WBC 10*3/mm3 4.64 6.46   HEMOGLOBIN g/dL 8.3* 9.2*   HEMATOCRIT % 28.3* 31.6*   PLATELETS 10*3/mm3 180 192     Results from last 7 days   Lab Units 08/19/19  1124 08/19/19  0503 08/19/19  0201   TROPONIN T ng/mL 0.036* 0.039* 0.033*     · NO CXR / EKG.    · BSC CRT CHECK: N/A; no printout of interrogation available for review.    · TTE:    · The left ventricular cavity is moderate-to-severely dilated.  · Left atrial cavity size is moderate-to-severely dilated.  · Moderate aortic valve regurgitation is present.  · Moderate-to-severe mitral valve regurgitation is present.  · Mild tricuspid valve regurgitation is present.  · Mild pulmonic valve regurgitation is present.  · Mild dilation of the aortic root and of the sinuses Valsalva present.  · Left ventricular wall thickness is consistent with mild concentric hypertrophy.  · Estimated EF = 24%.  · Left ventricular systolic function is severely decreased.  · The following left ventricular wall segments are hypokinetic: basal anterolateral, basal inferolateral, mid inferolateral, apical inferior, mid inferior, basal inferoseptal, basal anterior, basal inferior and basal inferoseptal. The following left ventricular wall segments are akinetic: mid anterior, apical anterior, mid anterolateral, apical lateral, apical septal, mid inferoseptal, apex and mid anteroseptal.  · The findings are consistent with dilated cardiomyopathy.  · Normal right ventricular cavity size and wall thickness with moderately reduced right ventricular systolic function noted; abnormal septal motion consistent with RV pacemaker.  · The aortic valve exhibits mild sclerosis.  · No evidence of pulmonary hypertension is present.  · There is a trivial pericardial effusion adjacent to the left ventricle.    Medication Review: REVIEWED; NO DRIP.    Assessment/Plan     Acceptable diuresis with acceptable GFR in the setting of advanced nonischemic cardiomyopathy with marked reduction in  systolic left ventricular function.  Would continue current treatment and attempt to add Entresto 24/26 BID in a.m. and continue to monitor daily BMP.  Reduced serum albumin indicates protein calorie malnutrition.  Prognosis guarded.  Await device interrogation.  Would defer MPS/LHC.  Await further evaluation and treatment of macrocytic anemia with consideration of a repeat trial of NOAC in the form of apixaban 2.5 mg BID.      Acute on chronic congestive heart failure (CMS/HCC)    Idiopathic cardiomyopathy (CMS/HCC)    Hyperlipidemia    Essential hypertension    Acute and chronic respiratory failure with hypoxia (CMS/HCC)    CKD (chronic kidney disease) stage 3, GFR 30-59 ml/min (CMS/HCC)    Hypothyroidism (acquired)    Dementia    Elevated troponin    08/20/19  7:24 AM

## 2019-08-20 NOTE — THERAPY EVALUATION
Acute Care - Wound/Debridement Initial Evaluation  [facility]     Patient Name: Imelda Maldonado  : 1923  MRN: [id number]  Today's Date: 2019     Date of Referral to PT: 19          Admit Date: 2019    Visit Dx:    ICD-10-CM ICD-9-CM   1. Acute on chronic congestive heart failure, unspecified heart failure type (CMS/HCC) I50.9 428.0   2. Hypoxia R09.02 799.02       Patient Active Problem List   Diagnosis   • Idiopathic cardiomyopathy (CMS/HCC)   • Arthritis   • Hyperlipidemia   • Gout   • DVT (deep venous thrombosis) (CMS/HCC)   • CRI (chronic renal insufficiency)   • Essential hypertension   • Hypotension   • Acute and chronic respiratory failure with hypoxia (CMS/HCC)   • CKD (chronic kidney disease) stage 3, GFR 30-59 ml/min (CMS/HCC)   • Pacemaker   • Acute on chronic congestive heart failure (CMS/HCC)   • Hypothyroidism (acquired)   • Dementia   • Elevated troponin        Past Medical History:   Diagnosis Date   • Acute GI bleeding     2013 - 2013. Status post 7 units of packed red blood cells. Esophagogastroduodenoscopy (no active bleed) and colonoscopy (active bleeding from flat cecal polyps and two ascending colonic polyps) suggestive of non-steroidal anti-inflammatory drug colopathy or mild ischemia. Positive for Clostridium difficile status post 14 days of antibiotics.     • Arthritis     reinitiation of celebrex, 2013   • CRI (chronic renal insufficiency)     mild   • DVT (deep venous thrombosis) (CMS/HCC)     RLE   • Gout    • Histoplasmosis    • Hyperlipidemia    • Hypertension    • Hypotension     symptoms of dizziness   • Idiopathic cardiomyopathy (CMS/HCC)         Past Surgical History:   Procedure Laterality Date   • APPENDECTOMY     • HYSTERECTOMY     • PACEMAKER IMPLANTATION      change out on 10/30/15 without complication   • POLYPECTOMY     • THYROIDECTOMY      chronic replacement therapy           Wound 19 0910 Left medial;distal foot Pressure  Injury (Active)   Dressing Appearance intact;dry 8/20/2019  7:50 AM   Base maroon/purple 8/20/2019  7:50 AM   Periwound redness 8/20/2019  7:50 AM   Periwound Temperature warm 8/20/2019  7:50 AM   Periwound Skin Turgor soft 8/20/2019  7:50 AM   Wound Length (cm) 0.5 cm 8/20/2019  7:50 AM   Wound Width (cm) 0.9 cm 8/20/2019  7:50 AM   Wound Depth (cm) 0 cm 8/20/2019  7:50 AM   Drainage Amount none 8/20/2019  7:50 AM   Care, Wound irrigated with;sterile normal saline;ultrasound therapy, non contact low frequency 8/20/2019  7:50 AM   Dressing Care, Wound foam;low-adherent 8/20/2019  7:50 AM         WOUND DEBRIDEMENT                        PT ASSESSMENT (last 12 hours)      Physical Therapy Evaluation     Row Name 08/20/19 0750          PT Evaluation Time/Intention    Subjective Information  complains of;weakness;fatigue;pain  -     Document Type  evaluation;therapy note (daily note);wound care  -     Mode of Treatment  individual therapy;physical therapy  -     Row Name 08/20/19 0750          General Information    Patient Profile Reviewed?  yes  -     Pertinent History of Current Functional Problem  Pt admitted with DTPI to L medial Foot  -     Risks Reviewed  patient:;increased discomfort  -     Benefits Reviewed  patient:;improve skin integrity  -     Barriers to Rehab  medically complex;previous functional deficit;physical barrier  -     Row Name 08/20/19 0750          Cognitive Assessment/Intervention- PT/OT    Orientation Status (Cognition)  oriented to;person;situation  -     Row Name 08/20/19 0750          Pain Assessment    Additional Documentation  Pain Scale: FACES Pre/Post-Treatment (Group)  -     Row Name 08/20/19 0750          Pain Scale: Numbers Pre/Post-Treatment    Pain Location - Side  Left  -     Pain Location - Orientation  medial  -     Pain Location  foot  -     Row Name 08/20/19 0750          Pain Scale: FACES Pre/Post-Treatment    Pain: FACES Scale, Pretreatment   4-->hurts little more  -     Pain: FACES Scale, Post-Treatment  4-->hurts little more  -MF     Pre/Post Treatment Pain Comment  pt with generalized c/o pain to foot / toes, increased with touch   -     Row Name 08/20/19 0750          Wound 08/19/19 0910 Left medial;distal foot Pressure Injury    Wound - Properties Group Date first assessed: 08/19/19  -CP Time first assessed: 0910  -CP Present on Hospital Admission: Y  -CP Side: Left  -CP Orientation: medial;distal  -CP Location: foot  -CP Primary Wound Type: Pressure inj  -CP Stage, Pressure Injury: deep tissue injury  -CP    Dressing Appearance  intact;dry  -     Base  maroon/purple  -     Periwound  redness  -     Periwound Temperature  warm  -     Periwound Skin Turgor  soft  -     Wound Length (cm)  0.5 cm 2x2cm area of periwound redness  -     Wound Width (cm)  0.9 cm  -MF     Wound Depth (cm)  0 cm  -     Drainage Amount  none  -     Care, Wound  irrigated with;sterile normal saline;ultrasound therapy, non contact low frequency 5 min MIST   -     Dressing Care, Wound  foam;low-adherent  -     Row Name 08/20/19 0750          Coping    Observed Emotional State  calm;cooperative  -     Verbalized Emotional State  acceptance  -     Row Name 08/20/19 0750          Plan of Care Review    Plan of Care Reviewed With  patient  -     Row Name 08/20/19 0750          Physical Therapy Clinical Impression    Date of Referral to PT  08/19/19  -     Patient/Family Goals Statement (PT Clinical Impression)  decrease pain   -     Criteria for Skilled Interventions Met (PT Clinical Impression)  yes;treatment indicated  -     Pathology/Pathophysiology Noted (Describe Specifically for Each System)  integumentary  -     Rehab Potential (PT Clinical Summary)  fair, will monitor progress closely  -     Care Plan Review (PT)  evaluation/treatment results reviewed;risks/benefits reviewed;care plan/treatment goals reviewed;current/potential  barriers reviewed;patient/other agree to care plan  -     Row Name 08/20/19 0750          Physical Therapy Goals    Wound Care Goal Selection (PT)  wound care, PT goal 1  -     Additional Documentation  Wound Care Goal Selection (PT) (Row)  -     Row Name 08/20/19 0750          Wound Care Goal 1 (PT)    Wound Care Goal 1 (PT)  Decrease wound area by 20% as evidence of improved skin integrity  -     Time Frame (Wound Care Goal 1, PT)  10 days  -     Row Name 08/20/19 0750          Positioning and Restraints    Pre-Treatment Position  in bed  -     Post Treatment Position  bed  -     In Bed  supine;call light within reach  -       User Key  (r) = Recorded By, (t) = Taken By, (c) = Cosigned By    Initials Name Provider Type    Les Avila, PT Physical Therapist    Marla Chávez, APRN Nurse Practitioner            Recommendation and Plan  Planned Therapy Interventions (PT Eval): wound care  Plan of Care Reviewed With: patient           Outcome Summary: Pt presents with DTPI to L medial foot POA.  Wound appears stable from intial photo by WOCRN. Pt will benefit from MIST therapy to help improve blood flow / circulation and increase skin integrity.  Plan of Care Reviewed With: patient            Time Calculation  PT Charges     Row Name 08/20/19 0750             Time Calculation    Start Time  0750  -      PT Goal Re-Cert Due Date  08/30/19  -        User Key  (r) = Recorded By, (t) = Taken By, (c) = Cosigned By    Initials Name Provider Type    Les Avila, PT Physical Therapist            Therapy Charges for Today     Code Description Service Date Service Provider Modifiers Qty    09887130183 HC PT EVAL LOW COMPLEXITY 4 8/20/2019 Les Duvall, PT GP 1    [id number]  PT NLFU MIST 8/20/2019 Les Duvall, PT GP 1                    Les Duvall, PT  8/20/2019

## 2019-08-20 NOTE — PLAN OF CARE
Problem: Patient Care Overview  Goal: Plan of Care Review  Outcome: Ongoing (interventions implemented as appropriate)   08/20/19 5932   Coping/Psychosocial   Plan of Care Reviewed With patient   OTHER   Outcome Summary Pt presents with DTPI to L medial foot POA. Wound appears stable from intial photo by WOSAULN. Pt will benefit from MIST therapy to help improve blood flow / circulation and increase skin integrity.

## 2019-08-20 NOTE — PROGRESS NOTES
[facility] Medicine Services  PROGRESS NOTE    Patient Name: Imelda Maldonado  : 1923  MRN: [id number]    Date of Admission: 2019  Length of Stay: 1  Primary Care Physician: Leyla Castro DO    Subjective   Subjective     CC:  Acute on chronic congestive heart failure (CMS/HCC)    HPI:  Pt reports less soa today but her BP has been low. She had excellent UOP so we have stopped the iv bumex for now. May resume oral tmw. She is bianca po and will try to get out of bed today.     ROS:  Otherwise ROS is negative except as mentioned in the HPI.    Objective   Objective     Vital Signs:   Temp:  [97.3 °F (36.3 °C)-98.2 °F (36.8 °C)] 97.4 °F (36.3 °C)  Heart Rate:  [74-87] 75  Resp:  [20] 20  BP: ()/(36-66) 94/62        Physical Exam:  Constitutional: No acute distress, awake, alert, nontoxic, thin and frail body habitus  Eyes: Pupils equal, sclerae anicteric, no conjunctival injection  HENT: NCAT, mucous membranes moist  Neck: Supple, no thyromegaly, no lymphadenopathy  Respiratory: faint crackles, no wheezing, not labored  Cardiovascular: RRR  Gastrointestinal: Positive bowel sounds, soft, nontender, nondistended  Musculoskeletal: No peripheral edema, normal muscle tone for age  Psychiatric: calm and cooperative  Neurologic: dementia  Skin: bruises from phlebotomy      Results Reviewed:  I have personally reviewed current lab, radiology, and data and agree.    Results from last 7 days   Lab Units 19  0334 19  0200   WBC 10*3/mm3 4.64 6.46   HEMOGLOBIN g/dL 8.3* 9.2*   HEMATOCRIT % 28.3* 31.6*   PLATELETS 10*3/mm3 180 192     Results from last 7 days   Lab Units 19  0334 19  0201   SODIUM mmol/L 140 139   POTASSIUM mmol/L 4.2 5.0   CHLORIDE mmol/L 99 103   CO2 mmol/L 29.0 24.0   BUN mg/dL 25* 20   CREATININE mg/dL 1.18* 1.17*   GLUCOSE mg/dL 79 162*   CALCIUM mg/dL 7.4* 7.6*   ALT (SGPT) U/L  --  10   AST (SGOT) U/L  --  34*     Estimated Creatinine  Clearance: 22.1 mL/min (A) (by C-G formula based on SCr of 1.18 mg/dL (H)).  No results found for: BNP    Microbiology Results Abnormal     None          Imaging Results (last 24 hours)     ** No results found for the last 24 hours. **        Results for orders placed during the hospital encounter of 08/19/19   Adult Transthoracic Echo Complete With Contrast if Necessary Per Protocol    Narrative · The left ventricular cavity is moderate-to-severely dilated.  · Left atrial cavity size is moderate-to-severely dilated.  · Moderate aortic valve regurgitation is present.  · Moderate-to-severe mitral valve regurgitation is present.  · Mild tricuspid valve regurgitation is present.  · Mild pulmonic valve regurgitation is present.  · Mild dilation of the aortic root and of the sinuses Valsalva present.  · Left ventricular wall thickness is consistent with mild concentric   hypertrophy.  · Estimated EF = 24%.  · Left ventricular systolic function is severely decreased.  · The following left ventricular wall segments are hypokinetic: basal   anterolateral, basal inferolateral, mid inferolateral, apical inferior,   mid inferior, basal inferoseptal, basal anterior, basal inferior and basal   inferoseptal. The following left ventricular wall segments are akinetic:   mid anterior, apical anterior, mid anterolateral, apical lateral, apical   septal, mid inferoseptal, apex and mid anteroseptal.  · The findings are consistent with dilated cardiomyopathy.  · Normal right ventricular cavity size and wall thickness with moderately   reduced right ventricular systolic function noted; abnormal septal motion   consistent with RV pacemaker.  · The aortic valve exhibits mild sclerosis.  · No evidence of pulmonary hypertension is present.  · There is a trivial pericardial effusion adjacent to the left ventricle.          I have reviewed the medications.  Scheduled Meds:  carvedilol 12.5 mg Oral BID With Meals   donepezil 5 mg Oral Nightly    ferrous sulfate 325 mg Oral Daily With Breakfast   folic acid 1 mg Oral Daily   heparin (porcine) 5,000 Units Subcutaneous Q8H   levothyroxine 50 mcg Oral Daily   mirtazapine 7.5 mg Oral Nightly   pharmacy consult - MTM  Does not apply Daily   potassium chloride 20 mEq Oral Daily With Breakfast   sertraline 50 mg Oral Daily   sodium chloride 3 mL Intravenous Q12H     Continuous Infusions:   PRN Meds:.•  acetaminophen  •  bisacodyl  •  diclofenac  •  ipratropium-albuterol  •  magnesium hydroxide  •  sodium chloride  •  sodium chloride    Assessment/Plan   Assessment / Plan     Active Hospital Problems    Diagnosis  POA   • **Acute on chronic congestive heart failure (CMS/HCC) [I50.9]  Yes   • Hypothyroidism (acquired) [E03.9]  Yes   • Dementia [F03.90]  Yes   • Elevated troponin [R74.8]  Yes   • CKD (chronic kidney disease) stage 3, GFR 30-59 ml/min (CMS/HCC) [N18.3]  Yes   • Acute and chronic respiratory failure with hypoxia (CMS/HCC) [J96.21]  Yes   • Hyperlipidemia [E78.5]  Yes   • Idiopathic cardiomyopathy (CMS/HCC) [I42.8]  Yes   • Essential hypertension [I10]  Yes      Resolved Hospital Problems   No resolved problems to display.          Brief Hospital Course to date:  Imelda Maldonado is a 89 y.o. female presenting to the ED with complaint of shortness of breath who was found to have concern for acute on chronic CHF with acute on chronic respiratory failure.     Acute on chronic CHF  - Most recent echo showing EF of 20% - 21%  - Cardiology consulted and following  -- Got iv lasix in ed and bumex initially 1mg iv bid - stopped on 8/20 due to hypotension  - Normally on Lasix 40 mg p.o. daily, holding p.o. dosing for now  - Monitor electrolytes  - Daily weight  - Heart failure navigator     Acute on chronic respiratory failure  - Likely secondary to CHF  -PRN DuoNeb  - Wean oxygen, past notes show normal use of 2L supplemental oxygen     Elevated troponin  -Denies chest pain  -Likely secondary to above  -Trend  troponin  -Trend EKG     Idiopathic cardiomyopathy  - S/P Bi-V pacemaker implantation     Hypertension  - Continue home dose of carvedilol and lisinopril     Dementia  -Continue Aricept     Hypothyroid  -Continue home dose of levothyroxine     CKD 3  - Appears to be at baseline  - Continue to monitor     Anemia  - Slightly lower than previous trend  - Denies blood in stool, but patient is poor historian  - Iron studies, occult stool         DVT Prophylaxis:  heparin    CODE STATUS:   Code Status and Medical Interventions:   Ordered at: 08/19/19 0453     Limited Support to NOT Include:    Intubation     Code Status:    No CPR     Medical Interventions (Level of Support Prior to Arrest):    Limited           Electronically signed by Quyen Gordon MD, 08/20/19, 2:23 PM.

## 2019-08-20 NOTE — PLAN OF CARE
Problem: Patient Care Overview  Goal: Plan of Care Review  Outcome: Ongoing (interventions implemented as appropriate)   08/20/19 2330   Coping/Psychosocial   Plan of Care Reviewed With patient   Plan of Care Review   Progress no change   OTHER   Outcome Summary PT eval complete. Pt presents with limited mobility and increased pain with all movement. Per NH report pt requried mod A for bed mobility and transfers and was nonambulatory.l Pt presents dependent with mobility at this time. Skilled PT services to improve mobility with DC to SNF recommended when appropriate

## 2019-08-20 NOTE — THERAPY EVALUATION
Patient Name: Imelda Maldonado  : 1923    MRN: [id number]                              Today's Date: 2019       Admit Date: 2019    Visit Dx:     ICD-10-CM ICD-9-CM   1. Acute on chronic congestive heart failure, unspecified heart failure type (CMS/HCC) I50.9 428.0   2. Hypoxia R09.02 799.02     Patient Active Problem List   Diagnosis   • Idiopathic cardiomyopathy (CMS/HCC)   • Arthritis   • Hyperlipidemia   • Gout   • DVT (deep venous thrombosis) (CMS/HCC)   • CRI (chronic renal insufficiency)   • Essential hypertension   • Hypotension   • Acute and chronic respiratory failure with hypoxia (CMS/HCC)   • CKD (chronic kidney disease) stage 3, GFR 30-59 ml/min (CMS/HCC)   • Pacemaker   • Acute on chronic congestive heart failure (CMS/HCC)   • Hypothyroidism (acquired)   • Dementia   • Elevated troponin     Past Medical History:   Diagnosis Date   • Acute GI bleeding     2013 - 2013. Status post 7 units of packed red blood cells. Esophagogastroduodenoscopy (no active bleed) and colonoscopy (active bleeding from flat cecal polyps and two ascending colonic polyps) suggestive of non-steroidal anti-inflammatory drug colopathy or mild ischemia. Positive for Clostridium difficile status post 14 days of antibiotics.     • Arthritis     reinitiation of celebrex, 2013   • CRI (chronic renal insufficiency)     mild   • DVT (deep venous thrombosis) (CMS/HCC)     RLE   • Gout    • Histoplasmosis    • Hyperlipidemia    • Hypertension    • Hypotension     symptoms of dizziness   • Idiopathic cardiomyopathy (CMS/HCC)      Past Surgical History:   Procedure Laterality Date   • APPENDECTOMY     • HYSTERECTOMY     • PACEMAKER IMPLANTATION      change out on 10/30/15 without complication   • POLYPECTOMY     • THYROIDECTOMY      chronic replacement therapy     General Information     Row Name 19 0906 19 0750       PT Evaluation Time/Intention    Subjective Information  --  complains  of;weakness;fatigue;pain  -    Document Type  evaluation  -AA  evaluation;therapy note (daily note);wound care  -MF    Mode of Treatment  physical therapy;individual therapy  -AA  individual therapy;physical therapy  -    Row Name 08/20/19 0906 08/20/19 0750       General Information    Patient Profile Reviewed?  yes  -AA  yes  -MF    Prior Level of Function  mod assist:;bed mobility;transfer;grooming per notes from NH in pt hard chart  -AA  --    Pertinent History of Current Functional Problem  --  Pt admitted with DTPI to L medial Foot  -    Existing Precautions/Restrictions  fall  -AA  --    Risks Reviewed  --  patient:;increased discomfort  -    Benefits Reviewed  --  patient:;improve skin integrity  -    Barriers to Rehab  medically complex;previous functional deficit;cognitive status  -AA  medically complex;previous functional deficit;physical barrier  -    Row Name 08/20/19 0906          Relationship/Environment    Lives With  facility resident  -     Row Name 08/20/19 0906          Resource/Environmental Concerns    Current Living Arrangements  residential facility  -AA     Row Name 08/20/19 0906          Home Main Entrance    Number of Stairs, Main Entrance  none  -AA     Row Name 08/20/19 0906          Stairs Within Home, Primary    Number of Stairs, Within Home, Primary  none  -AA     Row Name 08/20/19 0906 08/20/19 0750       Cognitive Assessment/Intervention- PT/OT    Orientation Status (Cognition)  oriented to;person;situation  -AA  oriented to;person;situation  -    Row Name 08/20/19 0906          Safety Issues, Functional Mobility    Safety Issues Affecting Function (Mobility)  ability to follow commands;friction/shear risk;insight into deficits/self awareness;sequencing abilities  -AA     Impairments Affecting Function (Mobility)  pain;range of motion (ROM);endurance/activity tolerance;strength;shortness of breath  -AA       User Key  (r) = Recorded By, (t) = Taken By, (c) = Cosigned  By    Initials Name Provider Type    Les Avila, PT Physical Therapist    Criss Grace, PT Physical Therapist        Mobility     Row Name 08/20/19 0908          Bed Mobility Assessment/Treatment    Bed Mobility Assessment/Treatment  rolling left;rolling right;scooting/bridging;supine-sit;sit-supine  -AA     Rolling Left Brooks (Bed Mobility)  maximum assist (25% patient effort);verbal cues;nonverbal cues (demo/gesture)  -AA     Rolling Right [place] (Bed Mobility)  maximum assist (25% patient effort);verbal cues;nonverbal cues (demo/gesture)  -AA     Scooting/Bridging Brooks (Bed Mobility)  dependent (less than 25% patient effort)  -AA     Supine-Sit Brooks (Bed Mobility)  dependent (less than 25% patient effort)  -AA     Sit-Supine [place] (Bed Mobility)  dependent (less than 25% patient effort)  -AA     Assistive Device (Bed Mobility)  bed rails;draw sheet;head of bed elevated  -AA     Comment (Bed Mobility)  pt screams out in pain with all mobility and when asked where the pain is pt unable to state.  VC for supine to from sit with pt unable to follow or A  -     Row Name 08/20/19 0908          Transfer Assessment/Treatment    Comment (Transfers)  pt screams out in pain but unable to state where pain is, unable to come to full stand due to decreased ROM BLE and trunk and pain  -     Row Name 08/20/19 0908          Sit-Stand Transfer    Sit-Stand Brooks (Transfers)  dependent (less than 25% patient effort)  -AA     Assistive Device (Sit-Stand Transfers)  other (see comments) BUE support and gait belt  -     Row Name 08/20/19 0908          Gait/Stairs Assessment/Training    Comment (Gait/Stairs)  per pt hard chart from NH, pt is nonambulatory  -AA       User Key  (r) = Recorded By, (t) = Taken By, (c) = Cosigned By    Initials Name Provider Type    Criss Grace, PT Physical Therapist        Obj/Interventions     Row Name 08/20/19 0912          General  ROM    GENERAL ROM COMMENTS  B ankles and hip WFL, B knees 25% lacking from full range PROM  -AA     Row Name 08/20/19 0912          Therapeutic Exercise    Lower Extremity (Therapeutic Exercise)  gastroc stretch, bilateral;hamstring stretch, bilateral  -AA     Lower Extremity Range of Motion (Therapeutic Exercise)  hip abduction/adduction, bilateral;hip internal/external rotation, bilateral  -AA     Exercise Type (Therapeutic Exercise)  PROM (passive range of motion)  -AA     Position (Therapeutic Exercise)  supine  -AA     Row Name 08/20/19 0912          Static Sitting Balance    Level of [place] (Unsupported Sitting, Static Balance)  maximal assist, 25 to 49% patient effort  -AA     Sitting Position (Unsupported Sitting, Static Balance)  sitting on edge of bed  -AA     Time Able to Maintain Position (Unsupported Sitting, Static Balance)  15 to 30 seconds  -AA     Row Name 08/20/19 0912          Static Standing Balance    Level of [place] (Supported Standing, Static Balance)  dependent, less than 25% patient effort  -AA     Time Able to Maintain Position (Supported Standing, Static Balance)  less than 15 seconds  -AA     Assistive Device Utilized (Supported Standing, Static Balance)  other (see comments) BUE support and gait belt  -AA     Row Name 08/20/19 0912          Sensory Assessment/Intervention    Sensory General Assessment  no sensation deficits identified  -AA       User Key  (r) = Recorded By, (t) = Taken By, (c) = Cosigned By    Initials Name Provider Type    AA Criss Aguilar L, PT Physical Therapist        Goals/Plan     Row Name 08/20/19 0915          Bed Mobility Goal 1 (PT)    Activity/Assistive Device (Bed Mobility Goal 1, PT)  sit to supine/supine to sit;rolling to left;rolling to right  -AA     [place] Level/Cues Needed (Bed Mobility Goal 1, PT)  moderate assist (50-74% patient effort)  -AA     Time Frame (Bed Mobility Goal 1, PT)  2 weeks  -AA     Row Name 08/20/19 0915           Transfer Goal 1 (PT)    Activity/Assistive Device (Transfer Goal 1, PT)  sit-to-stand/stand-to-sit  -AA     [place] Level/Cues Needed (Transfer Goal 1, PT)  maximum assist (25-49% patient effort)  -AA     Time Frame (Transfer Goal 1, PT)  2 weeks  -AA       User Key  (r) = Recorded By, (t) = Taken By, (c) = Cosigned By    Initials Name Provider Type    AA Criss Aguilar, PT Physical Therapist        Clinical Impression     Row Name 08/20/19 0913 08/20/19 0750       Pain Assessment    Additional Documentation  Pain Scale: FACES Pre/Post-Treatment (Group)  -AA  Pain Scale: FACES Pre/Post-Treatment (Group)  -    Row Name 08/20/19 0913 08/20/19 0750       Pain Scale: Numbers Pre/Post-Treatment    Pain Location - Side  --  Left  -MF    Pain Location - Orientation  --  medial  -MF    Pain Location  --  foot  -MF    Pain Intervention(s)  Repositioned  -  --    Row Name 08/20/19 0913 08/20/19 0750       Pain Scale: FACES Pre/Post-Treatment    Pain: FACES Scale, Pretreatment  4-->hurts little more  -AA  4-->hurts little more  -MF    Pain: FACES Scale, Post-Treatment  6-->hurts even more  -AA  4-->hurts little more  -MF    Pre/Post Treatment Pain Comment  pt unable to state where pain is located  -AA  pt with generalized c/o pain to foot / toes, increased with touch   -    Row Name 08/20/19 0917 08/20/19 0913       Plan of Care Review    Plan of Care Reviewed With  patient  -AA  patient  -AA    Row Name 08/20/19 0750 08/20/19 0337       Plan of Care Review    Plan of Care Reviewed With  patient  -MF  patient  -CH    Row Name 08/20/19 0913 08/20/19 0750       Physical Therapy Clinical Impression    Date of Referral to PT  --  08/19/19  -    Patient/Family Goals Statement (PT Clinical Impression)  --  decrease pain   -    Criteria for Skilled Interventions Met (PT Clinical Impression)  yes;treatment indicated  -AA  yes;treatment indicated  -    Pathology/Pathophysiology Noted (Describe Specifically for Each  System)  --  integumentary  -    Rehab Potential (PT Clinical Summary)  fair, will monitor progress closely  -  fair, will monitor progress closely  -    Predicted Duration of Therapy (PT)  2 weeks  -  --    Care Plan Review (PT)  --  evaluation/treatment results reviewed;risks/benefits reviewed;care plan/treatment goals reviewed;current/potential barriers reviewed;patient/other agree to care plan  -    Row Name 08/20/19 0913          Vital Signs    Pre SpO2 (%)  96  -AA     O2 Delivery Pre Treatment  supplemental O2  -AA     Intra SpO2 (%)  92  -AA     O2 Delivery Intra Treatment  supplemental O2  -AA     Post SpO2 (%)  94  -AA     O2 Delivery Post Treatment  supplemental O2  -AA     Pre Patient Position  Supine  -AA     Intra Patient Position  Sitting  -AA     Post Patient Position  Supine  -AA     Row Name 08/20/19 0913 08/20/19 0750       Positioning and Restraints    Pre-Treatment Position  in bed  -AA  in bed  -    Post Treatment Position  bed  -AA  bed  -MF    In Bed  notified nsg;with nsg;R waffle boot;L waffle boot;pillow between legs;side lying left;call light within reach;encouraged to call for assist;exit alarm on  -AA  supine;call light within reach  -      User Key  (r) = Recorded By, (t) = Taken By, (c) = Cosigned By    Initials Name Provider Type     Les Duvall, PT Physical Therapist    Criss Grace, PT Physical Therapist    Miguelina Goldsmith, RN Registered Nurse        Outcome Measures     Row Name 08/20/19 0919          How much help from another person do you currently need...    Turning from your back to your side while in flat bed without using bedrails?  1  -AA     Moving from lying on back to sitting on the side of a flat bed without bedrails?  1  -AA     Moving to and from a bed to a chair (including a wheelchair)?  1  -AA     Standing up from a chair using your arms (e.g., wheelchair, bedside chair)?  1  -AA     Climbing 3-5 steps with a railing?  1  -AA      To walk in hospital room?  1  -AA     AM-[facility] 6 Clicks Score (PT)  6  -AA     Row Name 08/20/19 0919          Functional Assessment    Outcome Measure Options  AM-PAC 6 Clicks Basic Mobility (PT)  -AA       User Key  (r) = Recorded By, (t) = Taken By, (c) = Cosigned By    Initials Name Provider Type    Criss Grace, PT Physical Therapist        Physical Therapy Education     Title: PT OT SLP Therapies (In Progress)     Topic: Physical Therapy (In Progress)     Point: Mobility training (In Progress)     Learning Progress Summary           Patient Eager, E, NR by HARIS at 8/20/2019  9:16 AM                   Point: Precautions (In Progress)     Learning Progress Summary           Patient Eager, E, NR by HARIS at 8/20/2019  9:16 AM                               User Key     Initials Effective Dates Name Provider Type [facility] 04/02/18 -  Criss Aguilar PT Physical Therapist PT              PT Recommendation and Plan     Outcome Summary/Treatment Plan (PT)  Anticipated Discharge Disposition (PT): skilled nursing facility  Plan of Care Reviewed With: patient  Progress: no change  Outcome Summary: PT eval complete.  Pt presents with limited mobility and increased pain with all movement.  Per NH report pt requried mod A for bed mobility and transfers and was nonambulatory.l  Pt presents dependent with mobility at this time.  Skilled PT services to improve mobility with DC to SNF recommended when appropriate     Time Calculation:   PT Charges     Row Name 08/20/19 0921 08/20/19 0919 08/20/19 0750       Time Calculation    Start Time  0837  -AA  0837  -  0750  -    PT Received On  08/20/19  -  08/20/19  -  --    PT Goal Re-Cert Due Date  09/03/19  -  09/03/19  -  08/30/19  -       Time Calculation- PT    Total Timed Code Minutes- PT  39 minute(s)  -AA  --  --       Timed Charges    16120 - PT Therapeutic Exercise Minutes  15  -AA  --  --    08223 - PT Therapeutic Activity Minutes  24  -AA  --  --       User Key  (r) = Recorded By, (t) = Taken By, (c) = Cosigned By    Initials Name Provider Type    MF Les Duvall, PT Physical Therapist    AA Criss Aguilar, PT Physical Therapist        Therapy Charges for Today     Code Description Service Date Service Provider Modifiers Qty    [id number]  PT THER PROC EA 15 MIN 8/20/2019 Criss Aguilar, PT GP 1    [id number]  PT THERAPEUTIC ACT EA 15 MIN 8/20/2019 Criss Aguilar, PT GP 2          PT G-Codes  Outcome Measure Options: AM-PAC 6 Clicks Basic Mobility (PT)  AM-PAC 6 Clicks Score (PT): 6 April ASTRID Aguilar PT  8/20/2019

## 2019-08-20 NOTE — PROGRESS NOTES
Continued Stay Note  [facility]     Patient Name: Imelda Maldonado  MRN: [id number]  Today's Date: 8/20/2019    Admit Date: 8/19/2019    Discharge Plan     Row Name 08/20/19 1459       Plan    Plan  [facility]    Patient/Family in Agreement with Plan  other (see comments)    Plan Comments  Plant to return to [facility].  [facility] ambulance scheduled for 8/21/19 [id number]. Nursing staff plan to speak with daughter and will notify her of pending transfer. Will need transfer summary.      Final Discharge Disposition Code  04 - intermediate care facility        Discharge Codes    No documentation.       Expected Discharge Date and Time     Expected Discharge Date Expected Discharge Time    Aug 21, 2019             Aliya Duran RN

## 2019-08-20 NOTE — PLAN OF CARE
Problem: Patient Care Overview  Goal: Plan of Care Review  Outcome: Ongoing (interventions implemented as appropriate)   08/20/19 0337   Coping/Psychosocial   Plan of Care Reviewed With patient   Plan of Care Review   Progress no change   OTHER   Outcome Summary VSS. Pt has rested well throughout the night. Pt has been confused throughout the night. Will continue to monitor.       Problem: Fall Risk (Adult)  Goal: Identify Related Risk Factors and Signs and Symptoms  Outcome: Ongoing (interventions implemented as appropriate)      Problem: Pain, Chronic (Adult)  Goal: Acceptable Pain/Comfort Level and Functional Ability  Outcome: Ongoing (interventions implemented as appropriate)      Problem: Skin Injury Risk (Adult)  Goal: Skin Health and Integrity  Outcome: Ongoing (interventions implemented as appropriate)

## 2019-08-20 NOTE — NURSING NOTE
Contacted patient's daughter per MD request.  Updated on mobility and need for 02.  Also notified of DC plan for tomorrow at 1700 via ambulance to [facility]

## 2019-08-21 NOTE — PROGRESS NOTES
Continued Stay Note  [facility]     Patient Name: Imelda Maldonado  MRN: [id number]  Today's Date: 8/21/2019    Admit Date: 8/19/2019    Discharge Plan     Row Name 08/21/19 0931       Plan    Plan   Rehab    Patient/Family in Agreement with Plan  yes    Plan Comments  Ok to return to an IC bed today per per Jill at [facility] today. Please call report to [phone] and send the copied chart, summary and any scripts for controled meds with the pt. [facility] ambulance for 1400.        Discharge Codes    No documentation.       Expected Discharge Date and Time     Expected Discharge Date Expected Discharge Time    Aug 21, 2019             Aniya England RN

## 2019-08-21 NOTE — DISCHARGE SUMMARY
[facility]  DISCHARGE SUMMARY    Patient Name: Imelda Maldonado  : 1923  MRN: [id number]    Date of Admission: 2019  Date of Discharge:  19    Primary Care Physician: Leyla Castro DO    Consults     Date and Time Order Name Status Description    2019 0643 Inpatient Cardiology Consult Completed           Hospital Course     Presenting Problem:   Acute on chronic congestive heart failure, unspecified heart failure type (CMS/HCC) [I50.9]    Active Hospital Problems    Diagnosis  POA   • **Acute on chronic congestive heart failure (CMS/HCC) [I50.9]  Yes   • Hypothyroidism (acquired) [E03.9]  Yes   • Dementia [F03.90]  Yes   • Elevated troponin [R74.8]  Yes   • CKD (chronic kidney disease) stage 3, GFR 30-59 ml/min (CMS/HCC) [N18.3]  Yes   • Acute and chronic respiratory failure with hypoxia (CMS/HCC) [J96.21]  Yes   • Hyperlipidemia [E78.5]  Yes   • Idiopathic cardiomyopathy (CMS/HCC) [I42.8]  Yes   • Essential hypertension [I10]  Yes      Resolved Hospital Problems   No resolved problems to display.          Hospital Course:  Imelda Maldonado is a 89 y.o. female admitted with acute on chronic CHF with acute on chronic respiratory failure.     Acute on chronic CHF  - Most recent echo showing EF of 20% - 21%  - Cardiology consulted and following  -- Got iv lasix in ed and bumex initially 1mg iv bid - stopped on  due to hypotension  - Normally on Lasix 40 mg p.o. daily, resume at discharge   - Monitor electrolytes in 3 days  - Daily weight  - Heart failure navigator  -Resume ACE inhibitor low-dose 2.5 mg at discharge  -Continue beta-blocker  -Her advanced age at 89 years old multiple comorbidities and worsening heart failure her prognosis for life expectancy remains short.     Acute on chronic respiratory failure  - Likely secondary to CHF  -PRN DuoNeb  -  Continue oxygen to maintain sats greater than 90     Elevated  troponin  -Denies chest pain  -Likely secondary to above  -Trend troponin  -Trend EKG     Idiopathic cardiomyopathy  - S/P Bi-V pacemaker implantation     Hypertension  - Continue home dose of carvedilol and lisinopril     Dementia  -Continue Aricept     Hypothyroid  -Continue home dose of levothyroxine     CKD 3  - Appears to be at baseline  - Continue to monitor     Anemia  - Slightly lower than previous trend  - Denies blood in stool, but patient is poor historian  - Iron studies, occult stool           Discharge Follow Up Recommendations for labs/diagnostics:  Follow-up with PCP for BMP and CBC in 3 days.  Follow-up with cardiology as needed.    Day of Discharge     HPI:   Patient reports she is feeling better today.  She denies acute complaints.  She is tolerating p.o.  She does not ambulate.  No new concerns from the patient or from nursing.  No fever.  Her dementia remains chronic and stable.    ROS:  Otherwise ROS is negative except as mentioned in the HPI.    Vital Signs:   Temp:  [97.4 °F (36.3 °C)-97.7 °F (36.5 °C)] 97.7 °F (36.5 °C)  Heart Rate:  [74-75] 75  Resp:  [20-22] 20  BP: ()/(37-88) 123/88     Physical Exam:  Constitutional: No acute distress, awake, alert, nontoxic, thin and frail body habitus  Eyes: Pupils equal, sclerae anicteric, no conjunctival injection  HENT: NCAT, mucous membranes moist  Neck: Supple, no thyromegaly, no lymphadenopathy  Respiratory: faint crackles, no wheezing, not labored  Cardiovascular: RRR  Gastrointestinal: Positive bowel sounds, soft, nontender, nondistended  Musculoskeletal: No peripheral edema, normal muscle tone for age  Psychiatric: calm and cooperative  Neurologic: dementia  Skin: bruises from phlebotomy    Pertinent  and/or Most Recent Results     Results from last 7 days   Lab Units 08/21/19  0543 08/20/19  0334 08/19/19  0201 08/19/19  0200   WBC 10*3/mm3  --  4.64  --  6.46   HEMOGLOBIN g/dL  --  8.3*  --  9.2*   HEMATOCRIT %  --  28.3*  --  31.6*    PLATELETS 10*3/mm3  --  180  --  192   SODIUM mmol/L 140 140 139  --    POTASSIUM mmol/L 4.7 4.2 5.0  --    CHLORIDE mmol/L 101 99 103  --    CO2 mmol/L 28.0 29.0 24.0  --    BUN mg/dL 36* 25* 20  --    CREATININE mg/dL 1.35* 1.18* 1.17*  --    GLUCOSE mg/dL 83 79 162*  --    CALCIUM mg/dL 7.3* 7.4* 7.6*  --      Results from last 7 days   Lab Units 08/19/19  0201   BILIRUBIN mg/dL 0.4   ALK PHOS U/L 114   ALT (SGPT) U/L 10   AST (SGOT) U/L 34*           Invalid input(s): TG, LDLCALC, LDLREALC      Brief Urine Lab Results  (Last result in the past 365 days)      Color   Clarity   Blood   Leuk Est   Nitrite   Protein   CREAT   Urine HCG        10/01/18 0159 Yellow Clear Negative Trace Negative 30 mg/dL (1+)               Microbiology Results Abnormal     None          Imaging Results (all)     Procedure Component Value Units Date/Time    XR Chest 1 View [[id number]] Collected:  08/19/19 0224     Updated:  08/19/19 0226    Narrative:       CR Chest 1 Vw    INDICATION:   Shortness of breath protocol     COMPARISON:    10/1/2018    FINDINGS:  Single portable AP view(s) of the chest.  The heart size is enlarged. The aorta is densely calcified and ectatic. Dual-lead pacing device overlies the left chest.    There is marked prominence of the pulmonary vascularity centrally. The bronchovascular markings are significantly increased in both lung fields. There may be pleural fluid on the left side. No pneumothorax.      Impression:       Cardiomegaly with prominent bilateral vascular congestive changes which show interval worsening when compared to the prior study of 10/1/2018.    Signer Name: Lemuel Pinon MD   Signed: 8/19/2019 2:24 AM   Workstation Name: [id number]    [facility]                    Results for orders placed during the hospital encounter of 08/19/19   Adult Transthoracic Echo Complete With Contrast if Necessary Per Protocol    Narrative · The left ventricular cavity is  moderate-to-severely dilated.  · Left atrial cavity size is moderate-to-severely dilated.  · Moderate aortic valve regurgitation is present.  · Moderate-to-severe mitral valve regurgitation is present.  · Mild tricuspid valve regurgitation is present.  · Mild pulmonic valve regurgitation is present.  · Mild dilation of the aortic root and of the sinuses Valsalva present.  · Left ventricular wall thickness is consistent with mild concentric   hypertrophy.  · Estimated EF = 24%.  · Left ventricular systolic function is severely decreased.  · The following left ventricular wall segments are hypokinetic: basal   anterolateral, basal inferolateral, mid inferolateral, apical inferior,   mid inferior, basal inferoseptal, basal anterior, basal inferior and basal   inferoseptal. The following left ventricular wall segments are akinetic:   mid anterior, apical anterior, mid anterolateral, apical lateral, apical   septal, mid inferoseptal, apex and mid anteroseptal.  · The findings are consistent with dilated cardiomyopathy.  · Normal right ventricular cavity size and wall thickness with moderately   reduced right ventricular systolic function noted; abnormal septal motion   consistent with RV pacemaker.  · The aortic valve exhibits mild sclerosis.  · No evidence of pulmonary hypertension is present.  · There is a trivial pericardial effusion adjacent to the left ventricle.            Discharge Details        Discharge Medications      Changes to Medications      Instructions Start Date   acetaminophen 325 MG tablet  Commonly known as:  TYLENOL  What changed:  Another medication with the same name was removed. Continue taking this medication, and follow the directions you see here.   650 mg, Oral, Every 6 Hours PRN      furosemide 40 MG tablet  Commonly known as:  LASIX  What changed:  when to take this   40 mg, Oral, 3 Times Weekly         Continue These Medications      Instructions Start Date   BISAC-EVAC 10 MG  suppository  Generic drug:  bisacodyl   10 mg, Rectal, Daily PRN      carvedilol 12.5 MG tablet  Commonly known as:  COREG   12.5 mg, Oral, 2 Times Daily With Meals      diclofenac 1 % gel gel  Commonly known as:  VOLTAREN   4 g, Topical, As Needed      donepezil 5 MG tablet  Commonly known as:  ARICEPT   5 mg, Oral, Nightly      ferrous sulfate 325 (65 FE) MG tablet   325 mg, Oral, Daily With Breakfast      folic acid 1 MG tablet  Commonly known as:  FOLVITE   1 mg, Oral, Daily      ipratropium-albuterol 0.5-2.5 mg/3 ml nebulizer  Commonly known as:  DUO-NEB   3 mL, Nebulization, Every 4 Hours PRN      levothyroxine 100 MCG tablet  Commonly known as:  SYNTHROID, LEVOTHROID   50 mcg, Oral, Daily      lisinopril 2.5 MG tablet  Commonly known as:  PRINIVIL,ZESTRIL   2.5 mg, Oral, Daily      MILK OF MAGNESIA PO   30 mL, Oral, Daily PRN      mirtazapine 15 MG tablet  Commonly known as:  REMERON   7.5 mg, Oral, Nightly      nitroglycerin 0.4 MG SL tablet  Commonly known as:  NITROSTAT   0.4 mg, Sublingual, Every 5 Minutes PRN, Take no more than 3 doses in 15 minutes.       sertraline 25 MG tablet  Commonly known as:  ZOLOFT   50 mg, Oral, Daily         Stop These Medications    potassium chloride 20 MEQ CR tablet  Commonly known as:  K-DUR,KLOR-CON     traMADol 50 MG tablet  Commonly known as:  ULTRAM            No Known Allergies      Discharge Disposition:  Skilled Nursing Facility (DC - External)    Discharge Diet:  Diet Order   Procedures   • Diet Regular; Cardiac       Discharge Activity:   Activity Instructions     Activity as Tolerated              CODE STATUS:    Code Status and Medical Interventions:   Ordered at: 08/19/19 0453     Limited Support to NOT Include:    Intubation     Code Status:    No CPR     Medical Interventions (Level of Support Prior to Arrest):    Limited       Future Appointments   Date Time Provider Department Center   1/16/2020 12:45 PM Les Cardozo MD [facility] None        Additional Instructions for the Follow-ups that You Need to Schedule     Discharge Follow-up with PCP   As directed       Currently Documented PCP:    Leyla Castro DO    PCP Phone Number:    [phone]     Follow Up Details:  check BMP in 3 days                 Time Spent on Discharge:  40 minutes    Electronically signed by Quyen Gordon MD, 08/21/19, 2:13 PM.

## 2019-08-21 NOTE — PROGRESS NOTES
[facility]  PROGRESS NOTE    Date of Admission: 8/19/2019  Length of Stay: 2  Primary Care Physician: Leyla Castro DO    Chief Complaint: f/u A/C SHF   Problem List:   1. 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   a. Left bundle branch block.  b. History of nonsustained ventricular ectopy.  c. “Normal” coronary arteries by catheterization, October 2003, echo ejection fraction of 30%, October 2003.  d. Echocardiogram, 01/27/2004:  Global hypokinesis, ejection fraction 25%, moderate mitral regurgitation.  e. [employer] BI-V pacemaker implantation by Dr. Naidu on 04/16/2010 also with a coronary sinus lead placement.    f. Patient refuses upgrade to an ICD and Coumadin, March 2010.    g. Atrial fibrillation with “failed” cardioversion (early recurrence), January 2010.  h. Biventricular pacemaker generator change, 10/30/2015.  i. Echocardiogram, 10/1/18 with severe MR and moderate AI, EF=21%-25%.  j. Acute on chronic SHF August 2019   1. Echo 8/19/19: Ef 24%, moderate AI, mod-severe MR, no evidence of PAH, LA is moderate-severely dilated  2. Acute gastrointestinal bleed with hospitalization, 12/09/2013 - 12/24/2013.  a. Status post 7 units of packed red blood cells.  b. Esophagogastroduodenoscopy (no active bleed) and colonoscopy (active bleeding from flat cecal polyps and two ascending colonic polyps) suggestive of non-steroidal anti-inflammatory drug colopathy or mild ischemia.    c. Positive for Clostridium difficile status post 14 days of antibiotics.  d. Discontinuation of aspirin, no anticoagulation.    3. Arthritis.  a. Reinitiation of Celebrex, October 2013.  4. Dyslipidemia.  5. Gout.  6. History of histoplasmosis, OS.  7. History of remote DVT, RLE.  8. Status post thyroidectomy, remote.  a. Chronic replacement therapy.  9. History of intermittent mild CRI.  10. Hypertension.  11. Surgical  "history.  a. Appendectomy.  b. Thyroidectomy.  c. Hysterectomy.  d. Polypectomy.  12. Dementia   13. DNR code     Subjective      Patient asleep lying flat this morning and appears comfortable. She denies any significant dyspnea or other complaints. Plan is to transfer back to NH today      Objective   Vitals: /60 (BP Location: Right arm, Patient Position: Lying)   Pulse 74   Temp 97.7 °F (36.5 °C) (Axillary)   Resp 20   Ht 162.6 cm (64\")   Wt 58.4 kg (128 lb 12.8 oz)   LMP  (LMP Unknown)   SpO2 90%   BMI 22.11 kg/m²     Physical Exam:  GENERAL: Alert, cooperative, in no acute distress.   HEENT: Normocephalic, no jugular venous distention  HEART: No discrete PMI is noted. Regular rhythm, normal rate, and no murmurs, gallops, or rubs.   LUNGS: Few scattered ronchi, otherwise no wheezing, rales   ABDOMEN: Soft, bowel sounds present, non-tender   NEUROLOGIC: No focal abnormalities involving strength or sensation are noted.   EXTREMITIES: No clubbing, cyanosis, or edema noted.     Results:  Results from last 7 days   Lab Units 08/20/19  0334 08/19/19  0200   WBC 10*3/mm3 4.64 6.46   HEMOGLOBIN g/dL 8.3* 9.2*   HEMATOCRIT % 28.3* 31.6*   PLATELETS 10*3/mm3 180 192     Results from last 7 days   Lab Units 08/21/19  0543 08/20/19  0334 08/19/19  0201   SODIUM mmol/L 140 140 139   POTASSIUM mmol/L 4.7 4.2 5.0   CHLORIDE mmol/L 101 99 103   CO2 mmol/L 28.0 29.0 24.0   BUN mg/dL 36* 25* 20   CREATININE mg/dL 1.35* 1.18* 1.17*   GLUCOSE mg/dL 83 79 162*      Lab Results   Component Value Date    TRIG 102 10/29/2015    HDL 34 (L) 10/29/2015    LDL 90 10/29/2015    AST 34 (H) 08/19/2019    ALT 10 08/19/2019         Results from last 7 days   Lab Units 08/19/19  1124 08/19/19  0503 08/19/19  0201   TROPONIN T ng/mL 0.036* 0.039* 0.033*     Results from last 7 days   Lab Units 08/19/19  0201   PROBNP pg/mL 10,020.0*       Intake/Output Summary (Last 24 hours) at 8/21/2019 0816  Last data filed at 8/20/2019 " 1900  Gross per 24 hour   Intake --   Output 700 ml   Net -700 ml       I personally reviewed the patient's EKG/Telemetry data    Radiology Data:   Echo 8/19/19:  Interpretation Summary     · The left ventricular cavity is moderate-to-severely dilated.  · Left atrial cavity size is moderate-to-severely dilated.  · Moderate aortic valve regurgitation is present.  · Moderate-to-severe mitral valve regurgitation is present.  · Mild tricuspid valve regurgitation is present.  · Mild pulmonic valve regurgitation is present.  · Mild dilation of the aortic root and of the sinuses Valsalva present.  · Left ventricular wall thickness is consistent with mild concentric hypertrophy.  · Estimated EF = 24%.  · Left ventricular systolic function is severely decreased.  · The following left ventricular wall segments are hypokinetic: basal anterolateral, basal inferolateral, mid inferolateral, apical inferior, mid inferior, basal inferoseptal, basal anterior, basal inferior and basal inferoseptal. The following left ventricular wall segments are akinetic: mid anterior, apical anterior, mid anterolateral, apical lateral, apical septal, mid inferoseptal, apex and mid anteroseptal.  · The findings are consistent with dilated cardiomyopathy.  · Normal right ventricular cavity size and wall thickness with moderately reduced right ventricular systolic function noted; abnormal septal motion consistent with RV pacemaker.  · The aortic valve exhibits mild sclerosis.  · No evidence of pulmonary hypertension is present.  · There is a trivial pericardial effusion adjacent to the left ventricle.     Current Medications:    carvedilol 12.5 mg Oral BID With Meals   donepezil 5 mg Oral Nightly   ferrous sulfate 325 mg Oral Daily With Breakfast   folic acid 1 mg Oral Daily   heparin (porcine) 5,000 Units Subcutaneous Q8H   levothyroxine 50 mcg Oral Daily   mirtazapine 7.5 mg Oral Nightly   pharmacy consult - MTM  Does not apply Daily   potassium  "chloride 20 mEq Oral Daily With Breakfast   sertraline 50 mg Oral Daily   sodium chloride 3 mL Intravenous Q12H          Assessment and Plan:      1. Acute on chronic SHF/Idiopathic CM  - proBNP and CXR with worsening pulmonary edema  - echo with known severely depressed LVEF ~24%, moderate AI and mod-severe MR  - diuresed with IV Bumex   - Lisinopril discontinued with plan to start Entresto, however patient has had mostly low BP's and likely not able to tolerate Entresto  - plan to resume Lisinopril today, continue Coreg       2. Dementia and poor historian      3. Renal insufficiency   - Cr increased slightly to 1.3 from 1.18  - continue to monitor closely      4. Anemia  - recommend work-up and treatment: would consider IV Fe if persistent anemia despite oral iron      5. History of atrial fibrillation  - not previously anticoagulated due to severe GI bleed  in 2013  - PPM interrogation with no mode switches, therefore will not begin anticoagulation and will continue to monitor Afib burden through device interrogations      6. DNR code    Electronically signed by Danna Rodriguez PA-C, 08/21/19, 8:26 AM.    ___________________________________________________________  The patient was seen and examined by me.  Agree and verified/discussed key components of E/M as outlined by the Nurse practitioner/PA.    /88   Pulse 75   Temp 97.7 °F (36.5 °C) (Axillary)   Resp 20   Ht 162.6 cm (64\")   Wt 58.4 kg (128 lb 12.8 oz)   LMP  (LMP Unknown)   SpO2 90%   BMI 22.11 kg/m²     General Appearance:   · well developed  · well nourished  Neck:  · thyroid not enlarged  · supple  Respiratory:  · no respiratory distress  · normal breath sounds  · no rales  Cardiovascular:  · no jugular venous distention  · regular rhythm  · apical impulse normal  · S1 normal, S2 normal  · no S3, no S4   · no murmur  · no rub, no thrill  · carotid pulses normal; no bruit  · pedal pulses normal  · lower extremity edema: none  . "   Skin:   · warm, dry        Plan:    No events overnight  Creatinine slightly elevated today  Continue home diuretic regiment okay for discharge back to nursing home  Resume lisinopril  Will not start Entresto due to low normal blood pressure and rising creatinine.    Moise Ybarra MD, FACC  [facility]

## 2019-08-21 NOTE — PROGRESS NOTES
Case Management Discharge Note    Final Note: I spoke with Jill with [facility]. They are aware of the later ambulance. [facility] has access to EPIC. See previous note.    Destination - Selection Complete      Service Provider Request Status Selected Services Address Phone Number Fax Number    [facility] [address] [facility] [address] [phone]      Durable Medical Equipment      No service has been selected for the patient.      Dialysis/Infusion      No service has been selected for the patient.      Home Medical Care      No service has been selected for the patient.      Therapy      No service has been selected for the patient.      Community Resources      No service has been selected for the patient.        Transportation Services  Ambulance: [facility]    Final Discharge Disposition Code: 04 - intermediate care facility

## 2019-08-21 NOTE — NURSING NOTE
[facility]    Patient Name:  Imelda Maldonado  :  1923  DOS:  19    Active Hospital Problems    Diagnosis   • **Acute on chronic congestive heart failure (CMS/HCC)   • Hypothyroidism (acquired)   • Dementia   • Elevated troponin   • CKD (chronic kidney disease) stage 3, GFR 30-59 ml/min (CMS/HCC)   • Acute and chronic respiratory failure with hypoxia (CMS/HCC)   • Hyperlipidemia   • Idiopathic cardiomyopathy (CMS/[facility])     1. 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   a. Left bundle branch block.  b. History of nonsustained ventricular ectopy.  c. “Normal” coronary arteries by catheterization, 2003, echo ejection fraction of 30%, 2003.  d. Echocardiogram, 2004:  Global hypokinesis, ejection fraction 25%, moderate mitral regurgitation.  e. [employer] BI-V pacemaker implantation by Dr. Naidu on 2010 also with a coronary sinus lead placement.    f. Patient refuses upgrade to an ICD and Coumadin, 2010.    g. Atrial fibrillation with “failed” cardioversion (early recurrence), 2010.  h. Biventricular pacemaker generator change, 10/30/2015.         • Essential hypertension     89 yo female with a/c HF (EF 20%)/ICM, Hx of BIV PPM (no ICD due to advanced age and co-morbidities), Afib Chronic respiratory failure, dentia, CKD.  Pt reported with guarded prognosis.     Consult has been received.  Medical records have been reviewed.Plans to d/c SNF/rehab. [facility] to assist with management of HF s/s post d/c.  Unclear if patient will be able to make f/u office visit (mobility and transfer).  Would consider f/u in 2-4 if able.     No education provided.  NO family present today.    Echo Results:  19 echo  · The left ventricular cavity is moderate-to-severely dilated.  · Left atrial cavity size is moderate-to-severely dilated.  · Moderate aortic valve regurgitation is present.  · Moderate-to-severe mitral valve  regurgitation is present.  · Mild tricuspid valve regurgitation is present.  · Mild pulmonic valve regurgitation is present.  · Mild dilation of the aortic root and of the sinuses Valsalva present.  · Left ventricular wall thickness is consistent with mild concentric hypertrophy.  · Estimated EF = 24%.    Heart Failure Quality Measures    ACE I, ARB, ARNI (if EF <40%)     If no contraindications:  CAIN / CKD / Renal Stenosis    Evidence-based Beta Blocker (EF<40%)    (Bisoprolol, Carvedilol, Metoprolol Succinate)  Yes  coreg    Aldosterone Antagonist (EF <40%)  If no contraindications:  CAIN / CKD / Renal Stenosis    Heart Failure Education    No family present.  Pt with dementia    If EF < 35%  Pt has BIV PPM. NO ICD due to advance age and co-morbid conditions    Atrial fibrillation / Atrial flutter:  Quality Measure    CHADS-VASc Score:  CHADS-VASc Risk Assessment            7       Total Score        1 CHF    1 Hypertension    2 Age >/= 75    2 PRIOR STROKE/TIA/THROMBO    1 Sex: Female          Anticoagulation for CHADS-VASc >/=2    Yes  eliquis    Statin Therapy (for patients with a history of CAD, CVA/TIA, PVD, DM)  If no contraindications:  Due to age and advanced disease. Risks out weight benifits

## 2019-08-21 NOTE — PLAN OF CARE
Problem: Fall Risk (Adult)  Goal: Identify Related Risk Factors and Signs and Symptoms  Outcome: Ongoing (interventions implemented as appropriate)   08/21/19 0413   Fall Risk (Adult)   Related Risk Factors (Fall Risk) age-related changes;confusion/agitation;fatigue/slow reaction;gait/mobility problems;history of falls;environment unfamiliar   Signs and Symptoms (Fall Risk) presence of risk factors     Goal: Absence of Fall  Outcome: Ongoing (interventions implemented as appropriate)   08/21/19 0413   Fall Risk (Adult)   Absence of Fall making progress toward outcome       Problem: Skin Injury Risk (Adult)  Goal: Identify Related Risk Factors and Signs and Symptoms  Outcome: Ongoing (interventions implemented as appropriate)   08/21/19 0413   Skin Injury Risk (Adult)   Related Risk Factors (Skin Injury Risk) advanced age;mobility impaired;nutritional deficiencies

## 2019-08-21 NOTE — PROGRESS NOTES
Case Management Discharge Note    Final Note: Nursing had notified the daughter yesterday of the DC time today. She was in agreement.    Destination - Selection Complete      Service Provider Request Status Selected Services Address Phone Number Fax Number    [facility] [address] [facility] [address] [phone]      Durable Medical Equipment      No service has been selected for the patient.      Dialysis/Infusion      No service has been selected for the patient.      Home Medical Care      No service has been selected for the patient.      Therapy      No service has been selected for the patient.      Community Resources      No service has been selected for the patient.        Transportation Services  Ambulance: [facility]    Final Discharge Disposition Code: 04 - intermediate care facility

## 2019-08-21 NOTE — NURSING NOTE
Order received for Phase II Cardiac Rehab. Staff will continue to follow and is available if needed.

## 2019-08-21 NOTE — PROGRESS NOTES
Continued Stay Note  [facility]     Patient Name: Imelda Maldonado  MRN: [id number]  Today's Date: 8/21/2019    Admit Date: 8/19/2019    Discharge Plan     Row Name 08/21/19 0931       Plan    Plan   Rehab    Patient/Family in Agreement with Plan  yes    Plan Comments  Ok to return to an IC per [facility]. Please call report to [phone] and send the copied chart, summary and any scripts for controled meds with the pt. [facility] ambulance        Discharge Codes    No documentation.       Expected Discharge Date and Time     Expected Discharge Date Expected Discharge Time    Aug 21, 2019             Aniya England RN

## 2019-08-22 NOTE — PROGRESS NOTES
Pt. Referred for Phase II Cardiac Rehab. Staff attempted to contact Patient in regards to scheduling an appointment. No answer and no voicemail set up.

## 2019-09-04 PROBLEM — J18.9 HCAP (HEALTHCARE-ASSOCIATED PNEUMONIA): Status: ACTIVE | Noted: 2019-01-01

## 2019-09-04 NOTE — PROGRESS NOTES
Continued Stay Note  [facility]     Patient Name: Imelda Maldonado  MRN: [id number]  Today's Date: 9/4/2019    Admit Date: 9/4/2019    Discharge Plan     Row Name 09/04/19 1218       Plan    Plan  [facility]     Patient/Family in Agreement with Plan  yes    Plan Comments  Patient sleeping soundly during initial CM visit so information pulled from chart and discussion with [facility] - patient is a long term care resident at [facility], she also has a bed hold with a remaining 12 days left at this time. Will attempt to reach family by phone - patient to return to [facility] via [facility] when medically ready - opal [phone]     Final Discharge Disposition Code  04 - intermediate care facility        Discharge Codes    No documentation.             Opal Faith RN

## 2019-09-04 NOTE — CONSULTS
Infectious Disease Consult  Imelda Maldonado  4/29/1923  [id number]    Date of Consult: 9/4/2019  Admission Date: 9/4/2019    Requesting Provider: Beverly GUZMAN MD  Evaluating Physician: Didier Del Real MD    Chief Complaint: shortness of breath, cough    Reason for Consultation: possible pneumonia    History of present illness:   Patient is a 89 y.o.  Yr old female with history of idiopathic cardiomyopathy (EF 20%), osteoarthritis, CKD, DVT, gout, pacemaker/ICD, dementia. Recent admission from 8/19 to 8/21 with heart failure, tx with diuresis.    Presented back to [facility] ED on 9/4 from SNF due to concerns of of worsening shortness of breath and productive cough. Afebrile.  Unfortunately she is not oriented to place or time and is unable to provide any additional history. She is currently on 3 L O2. BP stable. WBC 6. Blood cx pending. CTA concerning for left sided pnuemonia, Started on vancomycin and zosyn. BNP is 7535. Lactate 1.0. ID consulted for assistance with antibiotics. Currently resting comfortably. RN has not appreciated any cough. Patient complains of chronic chest pain.     Review of Systems  Unable to provide other than as above due to dementia      Past Medical History:   Diagnosis Date   • Acute GI bleeding     12/09/2013 - 12/24/2013. Status post 7 units of packed red blood cells. Esophagogastroduodenoscopy (no active bleed) and colonoscopy (active bleeding from flat cecal polyps and two ascending colonic polyps) suggestive of non-steroidal anti-inflammatory drug colopathy or mild ischemia. Positive for Clostridium difficile status post 14 days of antibiotics.     • Arthritis     reinitiation of celebrex, October 2013   • CRI (chronic renal insufficiency)     mild   • DVT (deep venous thrombosis) (CMS/HCC)     RLE   • Gout    • Histoplasmosis    • Hyperlipidemia    • Hypertension    • Hypotension     symptoms of dizziness   • Idiopathic cardiomyopathy (CMS/HCC)        Past Surgical History:    Procedure Laterality Date   • APPENDECTOMY     • HYSTERECTOMY     • PACEMAKER IMPLANTATION      change out on 10/30/15 without complication   • POLYPECTOMY     • THYROIDECTOMY      chronic replacement therapy       Social History     Socioeconomic History   • Marital status:      Spouse name: Not on file   • Number of children: Not on file   • Years of education: Not on file   • Highest education level: Not on file   Tobacco Use   • Smoking status: Former Smoker     Types: Cigarettes   • Smokeless tobacco: Never Used   Substance and Sexual Activity   • Drug use: No   • Sexual activity: Defer       family history includes Arthritis in her other; Diabetes in her other; Heart disease in her other; Hypertension in her other; Thyroid disease in her other.    No Known Allergies    Antibiotics:  Anti-Infectives (From admission, onward)    Ordered     Dose/Rate Route Frequency Start Stop    09/04/19 0621  vancomycin (dosing per levels)     Ordering Provider:  Didier Moses RPH     Does not apply Daily 09/05/19 0600 09/09/19 0559    09/04/19 0613  piperacillin-tazobactam (ZOSYN) 3.375 g in iso-osmotic dextrose 50 ml (premix)     Ordering Provider:  Adela Gomez PA    3.375 g  over 4 Hours Intravenous Every 8 Hours Scheduled 09/04/19 1200 09/09/19 1159    09/04/19 0613  Pharmacy to dose vancomycin     Ordering Provider:  Adela Gomez PA     Does not apply Continuous PRN 09/04/19 0613 09/09/19 0612    09/04/19 0522  vancomycin 1250 mg/250 mL 0.9% NS IVPB ([facility])     Ordering Provider:  Dung Valdez MD    20 mg/kg × 60.5 kg Intravenous Once 09/04/19 0524 09/04/19 0552    09/04/19 0522  piperacillin-tazobactam (ZOSYN) 3.375 g in iso-osmotic dextrose 50 ml (premix)     Ordering Provider:  Dung Valdez MD    3.375 g Intravenous Once 09/04/19 0524 09/04/19 0552          Other Medications:  Current Facility-Administered Medications   Medication Dose Route Frequency Provider Last Rate Last Dose  "  • acetaminophen (TYLENOL) tablet 650 mg  650 mg Oral Q6H PRN Beverly Glasgow MD       • carvedilol (COREG) tablet 12.5 mg  12.5 mg Oral BID With Meals Beverly Glasgow MD   12.5 mg at 09/04/19 0951   • diclofenac (VOLTAREN) 1 % gel 4 g  4 g Topical BID PRN Beverly Glasgow MD       • donepezil (ARICEPT) tablet 5 mg  5 mg Oral Nightly Beverly Glasgow MD       • ferrous sulfate tablet 325 mg  325 mg Oral Daily With Breakfast Beverly Glasgow MD   325 mg at 09/04/19 0950   • folic acid (FOLVITE) tablet 1 mg  1 mg Oral Daily Beverly Glasgow MD   1 mg at 09/04/19 0950   • furosemide (LASIX) injection 40 mg  40 mg Intravenous Once Luma Mane DO       • ipratropium-albuterol (DUO-NEB) nebulizer solution 3 mL  3 mL Nebulization Q4H PRN Adela Gomez PA   3 mL at 09/04/19 0637   • levothyroxine (SYNTHROID, LEVOTHROID) tablet 50 mcg  50 mcg Oral Q AM Beverly Glasgow MD   50 mcg at 09/04/19 0957   • lisinopril (PRINIVIL,ZESTRIL) tablet 2.5 mg  2.5 mg Oral Daily Beverly Glasgow MD   2.5 mg at 09/04/19 0950   • mirtazapine (REMERON) tablet 15 mg  15 mg Oral Daily Beverly Glasgow MD   15 mg at 09/04/19 0950   • piperacillin-tazobactam (ZOSYN) 3.375 g in iso-osmotic dextrose 50 ml (premix)  3.375 g Intravenous Q8H Adela Gomez PA        And   • Pharmacy to dose vancomycin   Does not apply Continuous PRN Adela Gomez PA       • sertraline (ZOLOFT) tablet 50 mg  50 mg Oral Daily Beverly Glasgow MD   50 mg at 09/04/19 0950   • sodium chloride 0.9 % flush 10 mL  10 mL Intravenous PRN Dung Valdez MD   10 mL at 09/04/19 0951   • [START ON 9/5/2019] vancomycin (dosing per levels)   Does not apply Daily Didier Moses, [facility]           Physical Exam:   Vital Signs   /52 (BP Location: Right arm, Patient Position: Lying)   Pulse 92   Temp 97.4 °F (36.3 °C) (Oral)   Resp 26   Ht 162.6 cm (64.02\")   Wt 60.5 kg (133 lb 6.1 oz)   LMP  (LMP Unknown)   SpO2 93%   BMI 22.88 kg/m²     GENERAL: Awake " and alert, but not oriented. Elderly but in no acute distress.   HEENT: Normocephalic, atraumatic.  PERRL. EOMI. No conjunctival injection. No icterus. Oropharynx clear without evidence of thrush or exudate.   NECK: Supple without nuchal rigidity. Moderate JVD appreciated  LYMPH: No cervical lymphadenopathy.  HEART: RRR; No murmur.  LUNGS: Rales throughout the left lung fields.  Diminished at right base.  No cough appreciated  ABDOMEN: Soft, nontender, nondistended. Positive bowel sounds. No rebound or guarding. No CVA tenderness  : no gramajo  EXT:  Chronic joint deformities on both hands, but no cyanosis, clubbing or significant edema.  MSK: FROM without joint effusions noted arms/legs.    SKIN: Warm and dry without cutaneous eruptions on Inspection/palpation.    NEURO: Oriented to name but not place or time. No focal deficits on motor/sensory exam at arms/legs.  PSYCHIATRIC: dementia     Laboratory Data    Results from last 7 days   Lab Units 09/04/19  0405 09/04/19  0357   WBC 10*3/mm3  --  6.12   HEMOGLOBIN g/dL  --  7.8*   HEMOGLOBIN, POC g/dL 7.1*  --    HEMATOCRIT %  --  27.3*   HEMATOCRIT POC % 21*  --    PLATELETS 10*3/mm3  --  181     Results from last 7 days   Lab Units 09/04/19  0405 09/04/19  0357   SODIUM mmol/L  --  142   POTASSIUM mmol/L  --  4.9   CHLORIDE mmol/L  --  106   CO2 mmol/L  --  25.0   BUN mg/dL  --  33*   CREATININE mg/dL 1.30 1.06*   GLUCOSE mg/dL  --  91   CALCIUM mg/dL  --  7.4*     Estimated Creatinine Clearance: 24.2 mL/min (by C-G formula based on SCr of 1.3 mg/dL).  Results from last 7 days   Lab Units 09/04/19  0357   ALK PHOS U/L 84  84   BILIRUBIN mg/dL 0.3  0.3   BILIRUBIN DIRECT mg/dL <0.2*   ALT (SGPT) U/L <5  <5   AST (SGOT) U/L 16  16               Microbiology:    9/4 blood cx pending    MRSA and sputum cx pending    Radiology:  Xr Chest 1 View    Result Date: 9/4/2019  Globular cardiomediastinal silhouette enlargement with severe left greater the right alveolar  disease. Left-sided pacer defibrillator. Signer Name: Rosario Sullivan MD  Signed: 9/4/2019 5:15 AM  [facility]: [email or web address]  [facility]    Ct Angiogram Chest With Contrast    Result Date: 9/4/2019  No evidence for pulmonary most. 2. Globular cardiac silhouette enlargement. 3. Bilateral pleural effusions. 4. Extensive alveolar disease asymmetrically worse on the left than the right. In addition to the airspace disease there are multiple bilateral nodular lesions in the lungs. This could be infectious or inflammatory in etiology but metastatic disease is also in the differential. 5. Partially calcified subcarinal lymph node. 6. No pneumothorax 7. Concern for left adrenal mass partly in the field-of-view. Signer Name: Rosario Sullivan MD  Signed: 9/4/2019 5:14 AM  Workstation Name: [email or web address]  [facility]     I personally reviewed the above imaging studies.      Impression:   1. Acute on chronic hypoxic respiratory failure: unknown baseline, currently comfortable on 3 L  2. Consolidation of LLL and DEJUAN on CT concerning for pneumonia:   3. Right pleural effusion  4. Acute on chronic systolic heart failure  5. Idiopathic cardiomyopathy (last EF 20%)  6. Dementia  7. Pacemaker, ICD  8. CKD stage 4  9. Hx of DVT  10. Hypothyroid    At this time is unclear to me how much worse than her baseline she truly is.  We will have to corroborate more history from family and skilled nursing facility.  She does have extensive pulmonary consolidations on the left and rales on exam although she do not demonstrate any cough during my interview.  She is nontoxic-appearing and has normal WBC, lactic acid, suggesting against infection however she is certainly high risk for healthcare associated pneumonia given her recent admissions and residence in a skilled nursing facility. Also high risk for aspiration. She likely has combination of both pneumonia and acute heart failure and will treat with  empiric antibiotics as well as diuresis.    PLAN: Thank you for asking us to see Imelda Maldonado, I recommend the following:   - f/u pending blood cx  - sputum culture if cough is productive  - MRSA nares screen, discussed with RN  - trend CBC, BMP  - check procalcitonin    - since she is non-toxic will stop vancomycin. She already had a dose this AM which will cover for the next 24 hrs at least  - continue empiric zosyn, renally dosed, pending clinical improvement    - diuresis and other supportive care per primary team    Discussed with Dr Glasgow. I will follow. Call with questions.     Didier Del Real MD  9/4/2019

## 2019-09-04 NOTE — PROGRESS NOTES
"Pharmacokinetic Consult - Vancomycin Dosing  Imelda Maldonado is a 89 y.o. female who has been consulted for vancomycin dosing for pneumonia  (goal trough 15-20 mcg/mL).  Ht - 162.6 cm (64\")  Wt - 60.5 kg (133 lb 6.1 oz)    Current Antimicrobial Therapy  Zosyn 3.375 gm IV q8h (extended infusion)   Vancomycin - pharmacy dosing per levels    Allergies  Patient has no known allergies.    Microbiology and Radiology  Microbiology Results (last 10 days)       ** No results found for the last 240 hours. **            Relevant clinical data and objective history reviewed:  Results from last 7 days   Lab Units 09/04/19  0405 09/04/19  0357   BUN mg/dL  --  33*   CREATININE mg/dL 1.30 1.06*     Estimated Creatinine Clearance: 24.2 mL/min (by C-G formula based on SCr of 1.3 mg/dL).  Intake & Output (last 3 days)         09/01 0701 - 09/02 0700 09/02 0701 - 09/03 0700 09/03 0701 - 09/04 0700    IV Piggyback   100    Total Intake(mL/kg)   100 (1.7)    Net   +100                 Patient weight: 60.5 kg (133 lb 6.1 oz)    Asessment/Plan  Will initiate dose at 1250 mg IV once       followed by  Vancomycin Random level 9/5 with morning labs    Pharmacy will order additional vancomycin doses based on vancomycin random level result.    Didier Moses, PharmD, BCPS  9/4/2019  6:17 AM  " Home

## 2019-09-04 NOTE — ED PROVIDER NOTES
"Subjective   89-year-old female with a history of dementia presents for evaluation of shortness of breath.  Of note, the patient has a long past medical history and wears home oxygen at all times.  She lives in a nursing home.  She reportedly had a recent \"shoulder fracture.\"  She is unable to provide us with any history at this time.  According to EMS personnel and nursing home staff, she has been complaining of increased shortness of breath over the past 24 hours.  She had an outpatient chest x-ray that reportedly showed a \"right pleural effusion.\"  No fevers.  She has had a productive cough.  She was subsequently brought to the ED to be evaluated.            Review of Systems   Unable to perform ROS: Dementia   Respiratory: Positive for cough and shortness of breath.        Past Medical History:   Diagnosis Date   • Acute GI bleeding     12/09/2013 - 12/24/2013. Status post 7 units of packed red blood cells. Esophagogastroduodenoscopy (no active bleed) and colonoscopy (active bleeding from flat cecal polyps and two ascending colonic polyps) suggestive of non-steroidal anti-inflammatory drug colopathy or mild ischemia. Positive for Clostridium difficile status post 14 days of antibiotics.     • Arthritis     reinitiation of celebrex, October 2013   • CRI (chronic renal insufficiency)     mild   • DVT (deep venous thrombosis) (CMS/HCC)     RLE   • Gout    • Histoplasmosis    • Hyperlipidemia    • Hypertension    • Hypotension     symptoms of dizziness   • Idiopathic cardiomyopathy (CMS/HCC)        No Known Allergies    Past Surgical History:   Procedure Laterality Date   • APPENDECTOMY     • HYSTERECTOMY     • PACEMAKER IMPLANTATION      change out on 10/30/15 without complication   • POLYPECTOMY     • THYROIDECTOMY      chronic replacement therapy       Family History   Problem Relation Age of Onset   • Arthritis Other    • Diabetes Other    • Heart disease Other    • Hypertension Other    • Thyroid disease Other  "       Social History     Socioeconomic History   • Marital status:      Spouse name: Not on file   • Number of children: Not on file   • Years of education: Not on file   • Highest education level: Not on file   Tobacco Use   • Smoking status: Former Smoker     Types: Cigarettes   • Smokeless tobacco: Never Used   Substance and Sexual Activity   • Drug use: No   • Sexual activity: Defer           Objective   Physical Exam   Constitutional: She is oriented to person, place, and time. She appears well-developed and well-nourished. No distress.   Nontoxic-appearing elderly female   HENT:   Head: Normocephalic and atraumatic.   Mouth/Throat: Oropharynx is clear and moist.   Eyes: EOM are normal. Pupils are equal, round, and reactive to light.   Neck: Normal range of motion. Neck supple. No JVD present.   Cardiovascular: Normal rate, regular rhythm and normal heart sounds. Exam reveals no gallop and no friction rub.   No murmur heard.  Pulmonary/Chest: Effort normal. No respiratory distress. She has no wheezes. She has no rales.   Nonlabored breathing, coarse breath sounds noted on right when compared to left, no accessory muscle use, no retractions   Abdominal: Soft. Bowel sounds are normal. She exhibits no distension and no mass. There is no tenderness. There is no rebound and no guarding.   Musculoskeletal: Normal range of motion.        Right lower leg: She exhibits no edema.        Left lower leg: She exhibits no edema.   Neurological: She is alert and oriented to person, place, and time.   Skin: Skin is warm and dry. No rash noted. She is not diaphoretic. No erythema.   Psychiatric: She has a normal mood and affect. Judgment and thought content normal.   Nursing note and vitals reviewed.      Procedures           ED Course  ED Course as of Sep 04 0550   Wed Sep 04, 2019   0412 89-year-old female sent from the nursing home to be evaluated for cough and shortness of breath.  Patient unable to provide any  history.  On arrival to the ED, patient nontoxic-appearing with nonlabored breathing and normal oxygen saturations on 2 L.  Moderate risk Well's.  EKG revealed a paced rhythm with a heart rate of 76 with occasional intrinsic beats with market ST depression in precordial leads that appears new when compared to prior EKGs.  [DD]   0525 Labs remarkable for minimally elevated troponin.  Aspirin given.  BNP significantly elevated.  Patient anemic with a hemoglobin of 7.8.  Chest CTA suggestive of pneumonia.  HCAP coverage initiated.  I discussed the patient's case with Dr. Mane, and the patient will be admitted under her care for further evaluation and treatment.  She is hemodynamically stable at this time.  [DD]      ED Course User Index  [DD] Dung Valdez MD         Final Diagnosis: as of Sep 04 0550   HCAP (healthcare-associated pneumonia)   Shortness of breath   Elevated troponin   Anemia, unspecified type          Recent Results (from the past 24 hour(s))   Light Blue Top    Collection Time: 09/04/19  3:56 AM   Result Value Ref Range    Extra Tube hold for add-on    Gold Top - SST    Collection Time: 09/04/19  3:56 AM   Result Value Ref Range    Extra Tube Hold for add-ons.    Protime-INR    Collection Time: 09/04/19  3:56 AM   Result Value Ref Range    Protime 15.3 (H) 11.2 - 14.3 Seconds    INR 1.27 (H) 0.85 - 1.16   Comprehensive Metabolic Panel    Collection Time: 09/04/19  3:57 AM   Result Value Ref Range    Glucose 91 65 - 99 mg/dL    BUN 33 (H) 8 - 23 mg/dL    Creatinine 1.06 (H) 0.57 - 1.00 mg/dL    Sodium 142 136 - 145 mmol/L    Potassium 4.9 3.5 - 5.2 mmol/L    Chloride 106 98 - 107 mmol/L    CO2 25.0 22.0 - 29.0 mmol/L    Calcium 7.4 (L) 8.2 - 9.6 mg/dL    Total Protein 7.7 6.0 - 8.5 g/dL    Albumin 3.10 (L) 3.50 - 5.20 g/dL    ALT (SGPT) <5 1 - 33 U/L    AST (SGOT) 16 1 - 32 U/L    Alkaline Phosphatase 84 39 - 117 U/L    Total Bilirubin 0.3 0.2 - 1.2 mg/dL    eGFR Non  Amer 48 (L) >60  mL/min/1.73    Globulin 4.6 gm/dL    A/G Ratio 0.7 g/dL    BUN/Creatinine Ratio 31.1 (H) 7.0 - 25.0    Anion Gap 11.0 5.0 - 15.0 mmol/L   BNP    Collection Time: 09/04/19  3:57 AM   Result Value Ref Range    proBNP 7,535.0 (H) 5.0-1,800.0 pg/mL   Troponin    Collection Time: 09/04/19  3:57 AM   Result Value Ref Range    Troponin T 0.033 (C) 0.000 - 0.030 ng/mL   Green Top (Gel)    Collection Time: 09/04/19  3:57 AM   Result Value Ref Range    Extra Tube Hold for add-ons.    Lavender Top    Collection Time: 09/04/19  3:57 AM   Result Value Ref Range    Extra Tube hold for add-on    CBC Auto Differential    Collection Time: 09/04/19  3:57 AM   Result Value Ref Range    WBC 6.12 3.40 - 10.80 10*3/mm3    RBC 2.55 (L) 3.77 - 5.28 10*6/mm3    Hemoglobin 7.8 (L) 12.0 - 15.9 g/dL    Hematocrit 27.3 (L) 34.0 - 46.6 %    .1 (H) 79.0 - 97.0 fL    MCH 30.6 26.6 - 33.0 pg    MCHC 28.6 (L) 31.5 - 35.7 g/dL    RDW 16.4 (H) 12.3 - 15.4 %    RDW-SD 63.2 (H) 37.0 - 54.0 fl    MPV 9.1 6.0 - 12.0 fL    Platelets 181 140 - 450 10*3/mm3    Neutrophil % 78.2 (H) 42.7 - 76.0 %    Lymphocyte % 12.1 (L) 19.6 - 45.3 %    Monocyte % 7.5 5.0 - 12.0 %    Eosinophil % 1.5 0.3 - 6.2 %    Basophil % 0.2 0.0 - 1.5 %    Immature Grans % 0.5 0.0 - 0.5 %    Neutrophils, Absolute 4.79 1.70 - 7.00 10*3/mm3    Lymphocytes, Absolute 0.74 0.70 - 3.10 10*3/mm3    Monocytes, Absolute 0.46 0.10 - 0.90 10*3/mm3    Eosinophils, Absolute 0.09 0.00 - 0.40 10*3/mm3    Basophils, Absolute 0.01 0.00 - 0.20 10*3/mm3    Immature Grans, Absolute 0.03 0.00 - 0.05 10*3/mm3    nRBC 0.0 0.0 - 0.2 /100 WBC   Hepatic Function Panel    Collection Time: 09/04/19  3:57 AM   Result Value Ref Range    Total Protein 7.7 6.0 - 8.5 g/dL    Albumin 3.10 (L) 3.50 - 5.20 g/dL    ALT (SGPT) <5 1 - 33 U/L    AST (SGOT) 16 1 - 32 U/L    Alkaline Phosphatase 84 39 - 117 U/L    Total Bilirubin 0.3 0.2 - 1.2 mg/dL    Bilirubin, Direct <0.2 (L) 0.2 - 0.3 mg/dL    Bilirubin, Indirect      POC CHEM 8    Collection Time: 09/04/19  4:05 AM   Result Value Ref Range    Glucose 86 70 - 130 mg/dL    BUN 31 (H) 8 - 26 mg/dL    Creatinine 1.30 0.60 - 1.30 mg/dL    Sodium 141 138 - 146 mmol/L    Potassium 4.7 3.5 - 4.9 mmol/L    Chloride 107 98 - 109 mmol/L    Total CO2 24 24 - 29 mmol/L    Hemoglobin 7.1 (L) 12.0 - 17.0 g/dL    Hematocrit 21 (L) 38 - 51 %    Ionized Calcium 1.07 (L) 1.20 - 1.32 mmol/L   Lactic Acid, Plasma    Collection Time: 09/04/19  5:31 AM   Result Value Ref Range    Lactate 1.0 0.5 - 2.0 mmol/L     Note: In addition to lab results from this visit, the labs listed above may include labs taken at another facility or during a different encounter within the last 24 hours. Please correlate lab times with ED admission and discharge times for further clarification of the services performed during this visit.    XR Chest 1 View   Final Result   Globular cardiomediastinal silhouette enlargement with severe left greater the right alveolar disease. Left-sided pacer defibrillator.      Signer Name: Rosario Sullivan MD    Signed: 9/4/2019 5:15 AM    Workstation Name: [facility]     Radiology [facility]      CT Angiogram Chest With Contrast   Final Result   No evidence for pulmonary most.   2. Globular cardiac silhouette enlargement.   3. Bilateral pleural effusions.   4. Extensive alveolar disease asymmetrically worse on the left than the right. In addition to the airspace disease there are multiple bilateral nodular lesions in the lungs. This could be infectious or inflammatory in etiology but metastatic disease is   also in the differential.   5. Partially calcified subcarinal lymph node.   6. No pneumothorax   7. Concern for left adrenal mass partly in the field-of-view.      Signer Name: Rosario Sullivan MD    Signed: 9/4/2019 5:14 AM    Workstation Name: [facility]     [facility]        Vitals:    09/04/19 0400 09/04/19 0500 09/04/19 0619 09/04/19 0637   BP:  "153/63 147/62 143/68    BP Location:   Right arm    Patient Position:   Lying    Pulse: 75 74 75 75   Resp:  18 28 26   Temp:   98.3 °F (36.8 °C)    TempSrc:   Oral    SpO2: 92% 94% 93% 93%   Weight:   60.5 kg (133 lb 6.1 oz)    Height:   162.6 cm (64.02\")      Medications   sodium chloride 0.9 % flush 10 mL (not administered)   piperacillin-tazobactam (ZOSYN) 3.375 g in iso-osmotic dextrose 50 ml (premix) (not administered)     And   Pharmacy to dose vancomycin (not administered)   ipratropium-albuterol (DUO-NEB) nebulizer solution 3 mL (3 mL Nebulization Given 9/4/19 0637)   vancomycin (dosing per levels) (not administered)   furosemide (LASIX) injection 40 mg (not administered)   iopamidol (ISOVUE-300) 61 % injection 100 mL (50 mL Intravenous Given 9/4/19 0430)   vancomycin 1250 mg/250 mL 0.9% NS IVPB ([facility]) (1,250 mg Intravenous New Bag 9/4/19 0552)   piperacillin-tazobactam (ZOSYN) 3.375 g in iso-osmotic dextrose 50 ml (premix) (0 g Intravenous Stopped 9/4/19 0552)   aspirin suppository 300 mg (300 mg Rectal Given 9/4/19 0549)   furosemide (LASIX) injection 40 mg (40 mg Intravenous Given 9/4/19 0647)     ECG/EMG Results (last 24 hours)     Procedure Component Value Units Date/Time    ECG 12 Lead [[id number]] Collected:  09/04/19 0355     Updated:  09/04/19 0649        ECG 12 Lead                     Dung Nation MD  09/04/19 0659    "

## 2019-09-04 NOTE — H&P
[facility] Medicine Services  HISTORY AND PHYSICAL    Patient Name: Imelda Maldonado  : 1923  MRN: [id number]  Primary Care Physician: Leyla Castro DO  Date of admission: 2019      Subjective   Subjective     Chief Complaint:  SOB, chest discomfort    HPI:  Imelda Maldonado is a 89 y.o. female significant for chronic systolic heart failure, BiV-ICD, CAD, CKD 3, Afib, DVT, GI bleed, and dementia who presents to the ED from [facility] with complaint of shortness of breath.  Pt is a poor historian, but per the ED, patient was found to be SOB and hypoxic.  She is currently satting 91% on 3LNC.  Pt also found to have persistent elevated troponin at .033.  Pt was recently hospitalized  with CHF exacerbation.      Patient does have history of dementia and is a poor historian, but she is complaining of SOB. She has audible rhonchi.  Imaging showed LLL consolidation and significant airspace disease.  Pt given vanc/zosyn, duonebs.      Pt is reported to be a DNR. Hospital medicine will admit the patient for further evaluatoin and treatment.     Review of Systems   Unable to perform ROS: Dementia   Respiratory: Positive for chest tightness and shortness of breath.    All other systems reviewed and are negative.         All other systems reviewed and are negative.     Personal History     Past Medical History:   Diagnosis Date   • Acute GI bleeding     2013 - 2013. Status post 7 units of packed red blood cells. Esophagogastroduodenoscopy (no active bleed) and colonoscopy (active bleeding from flat cecal polyps and two ascending colonic polyps) suggestive of non-steroidal anti-inflammatory drug colopathy or mild ischemia. Positive for Clostridium difficile status post 14 days of antibiotics.     • Arthritis     reinitiation of celebrex, 2013   • CRI (chronic renal insufficiency)     mild   • DVT (deep venous thrombosis) (CMS/HCC)     RLE   • Gout    •  Histoplasmosis    • Hyperlipidemia    • Hypertension    • Hypotension     symptoms of dizziness   • Idiopathic cardiomyopathy (CMS/HCC)        Past Surgical History:   Procedure Laterality Date   • APPENDECTOMY     • HYSTERECTOMY     • PACEMAKER IMPLANTATION      change out on 10/30/15 without complication   • POLYPECTOMY     • THYROIDECTOMY      chronic replacement therapy       Family History: family history includes Arthritis in her other; Diabetes in her other; Heart disease in her other; Hypertension in her other; Thyroid disease in her other. Otherwise pertinent FHx was reviewed and unremarkable.     Social History:  reports that she has quit smoking. Her smoking use included cigarettes. She has never used smokeless tobacco. She reports that she does not use drugs.  Social History     Social History Narrative   • Not on file       Medications:    Available home medication information reviewed.  Medications Prior to Admission   Medication Sig Dispense Refill Last Dose   • acetaminophen (TYLENOL) 325 MG tablet Take 650 mg by mouth Every 6 (Six) Hours As Needed for mild pain (1-3).   Taking   • bisacodyl (BISAC-EVAC) 10 MG suppository Insert 10 mg into the rectum Daily As Needed for constipation.   Taking   • carvedilol (COREG) 12.5 MG tablet Take 12.5 mg by mouth 2 (Two) Times a Day With Meals.   9/3/2019 at 2138   • diclofenac (VOLTAREN) 1 % gel gel Apply 4 g topically to the appropriate area as directed 2 (Two) Times a Day As Needed.   Taking   • donepezil (ARICEPT) 5 MG tablet Take 5 mg by mouth Every Night.   9/3/2019 at 2138   • ferrous sulfate 325 (65 FE) MG tablet Take 325 mg by mouth Daily With Breakfast.   9/3/2019 at 1131   • folic acid (FOLVITE) 1 MG tablet Take 1 mg by mouth Daily.   9/3/2019 at 1131   • furosemide (LASIX) 40 MG tablet Take 1 tablet by mouth 3 (Three) Times a Week.   9/2/2019 at 2128   • levothyroxine (SYNTHROID, LEVOTHROID) 50 MCG tablet Take 50 mcg by mouth Daily.   9/3/2019 at  0505   • lisinopril (PRINIVIL,ZESTRIL) 2.5 MG tablet Take 2.5 mg by mouth Daily.   9/3/2019 at 1131   • Magnesium Hydroxide (MILK OF MAGNESIA PO) Take 30 mL by mouth Daily As Needed.   5/22/2019 at 1114   • mirtazapine (REMERON) 7.5 MG half tablet Take 15 mg by mouth Daily.   9/3/2019 at 0938   • nitroglycerin (NITROSTAT) 0.4 MG SL tablet Place 0.4 mg under the tongue Every 5 (Five) Minutes As Needed for Chest Pain. Take no more than 3 doses in 15 minutes.   Taking   • sertraline (ZOLOFT) 50 MG tablet Take 50 mg by mouth Daily.   9/3/2019 at 2138       No Known Allergies    Objective   Objective     Vital Signs:   Temp:  [97.7 °F (36.5 °C)-98.3 °F (36.8 °C)] 98.3 °F (36.8 °C)  Heart Rate:  [73-75] 75  Resp:  [18-28] 26  BP: (143-154)/(62-68) 143/68        Physical Exam   Constitutional: Awake, alert, frail  Eyes: PERRLA, sclerae anicteric, no conjunctival injection  HENT: NCAT, mucous membranes moist  Neck: Supple, no thyromegaly, no lymphadenopathy, trachea midline  Respiratory: bilateral course rhonchi, mild tachypnea   Cardiovascular: RRR, no murmurs, rubs, or gallops, palpable pedal pulses bilaterally  Gastrointestinal: Positive bowel sounds, soft, nontender, nondistended  Musculoskeletal: No bilateral ankle edema, no clubbing or cyanosis to extremities  Psychiatric: demented  Neurologic: Oriented to self only, strength symmetric in all extremities, Cranial Nerves grossly intact to confrontation, speech clear  Skin: bruising to legs      Results Reviewed:  I have personally reviewed current lab and radiology data.    Results from last 7 days   Lab Units 09/04/19  0405 09/04/19  0357 09/04/19  0356   WBC 10*3/mm3  --  6.12  --    HEMOGLOBIN g/dL  --  7.8*  --    HEMOGLOBIN, POC g/dL 7.1*  --   --    HEMATOCRIT %  --  27.3*  --    HEMATOCRIT POC % 21*  --   --    PLATELETS 10*3/mm3  --  181  --    INR   --   --  1.27*     Results from last 7 days   Lab Units 09/04/19  0531 09/04/19  0405 09/04/19  0357   SODIUM  mmol/L  --   --  142   POTASSIUM mmol/L  --   --  4.9   CHLORIDE mmol/L  --   --  106   CO2 mmol/L  --   --  25.0   BUN mg/dL  --   --  33*   CREATININE mg/dL  --  1.30 1.06*   GLUCOSE mg/dL  --   --  91   CALCIUM mg/dL  --   --  7.4*   ALT (SGPT) U/L  --   --  <5  <5   AST (SGOT) U/L  --   --  16  16   TROPONIN T ng/mL  --   --  0.033*   PROBNP pg/mL  --   --  7,535.0*   LACTATE mmol/L 1.0  --   --      Estimated Creatinine Clearance: 24.2 mL/min (by C-G formula based on SCr of 1.3 mg/dL).  Brief Urine Lab Results  (Last result in the past 365 days)      Color   Clarity   Blood   Leuk Est   Nitrite   Protein   CREAT   Urine HCG        10/01/18 0159 Yellow Clear Negative Trace Negative 30 mg/dL (1+)             Imaging Results (last 24 hours)     Procedure Component Value Units Date/Time    XR Chest 1 View [[id number]] Collected:  09/04/19 0515     Updated:  09/04/19 0517    Narrative:       CR Chest 1 Vw    INDICATION:   Short of air and hypoxia     COMPARISON:    Chest x-ray 8/19/2019.    FINDINGS:  Single portable AP view(s) of the chest.  Globular cardiomediastinal silhouette enlargement and left-sided pacer/fibrillator again seen. Severe alveolar disease left greater than right. See accompanying CT chest dictation. No pneumothorax.      Impression:       Globular cardiomediastinal silhouette enlargement with severe left greater the right alveolar disease. Left-sided pacer defibrillator.    Signer Name: Rosario Sullivan MD   Signed: 9/4/2019 5:15 AM   Workstation Name: [email or web address]    [facility]    CT Angiogram Chest With Contrast [[id number]] Collected:  09/04/19 0514     Updated:  09/04/19 0516    Narrative:       INDICATION:   Hypoxic, short of air, recent fracture right arm.    TECHNIQUE:   CT angiogram of the chest with contrast. 3-D reformatted images were acquired.  Radiation dose reduction techniques included automated exposure control or exposure modulation based on body size.  Radiation audit for number of CT and nuclear cardiology  exams performed in the last year:  0. Dose recorded in the patient's chart.    COMPARISON:   Chest x-ray from 8/19/2019.    FINDINGS:   There is no evidence for pulmonary embolism. There is globular cardiac silhouette enlargement with small to moderate pericardial effusion. There is moderate right-sided pleural effusion. There is likely moderate to large left pleural effusion with  pleural fluid seen at the apex.. There is atherosclerotic vascular calcification involving the thoracic aorta and great vessels. The timing of the contrast bolus because the patient's heart failure does not allow for assessment for thoracic aortic  dissection. The patient has a pacemaker.    There is severe alveolar disease left lung with near complete consolidation of the left lower lobe and considerable consolidation of the left upper lobe. Please correlate for clinical evidence of aspiration or pneumonia. There is patchier airspace  disease in the right lung including the right middle lobe, right lower lobe and right upper lobe. Many of the opacities are nodular and the possibility of metastatic disease or atypical infectious disease should also be considered.. Please correlate with  the patient's history. This study is not sensitive for adenopathy given the amount of pleural and parenchymal disease. There is at least partially calcified subcarinal node up to about 3.2 x 2.2 cm in dimension. Compression fracture seen at the  thoracolumbar junction. There may be a left adrenal mass lesion 3.4 x 2.4 cm. This is partly in the field-of-view.      Impression:       No evidence for pulmonary most.  2. Globular cardiac silhouette enlargement.  3. Bilateral pleural effusions.  4. Extensive alveolar disease asymmetrically worse on the left than the right. In addition to the airspace disease there are multiple bilateral nodular lesions in the lungs. This could be infectious or inflammatory  in etiology but metastatic disease is  also in the differential.  5. Partially calcified subcarinal lymph node.  6. No pneumothorax  7. Concern for left adrenal mass partly in the field-of-view.    Signer Name: Rosario Sullivan MD   Signed: 9/4/2019 5:14 AM   Workstation Name: [email or web address]    [facility]        Results for orders placed during the hospital encounter of 08/19/19   Adult Transthoracic Echo Complete With Contrast if Necessary Per Protocol    Narrative · The left ventricular cavity is moderate-to-severely dilated.  · Left atrial cavity size is moderate-to-severely dilated.  · Moderate aortic valve regurgitation is present.  · Moderate-to-severe mitral valve regurgitation is present.  · Mild tricuspid valve regurgitation is present.  · Mild pulmonic valve regurgitation is present.  · Mild dilation of the aortic root and of the sinuses Valsalva present.  · Left ventricular wall thickness is consistent with mild concentric   hypertrophy.  · Estimated EF = 24%.  · Left ventricular systolic function is severely decreased.  · The following left ventricular wall segments are hypokinetic: basal   anterolateral, basal inferolateral, mid inferolateral, apical inferior,   mid inferior, basal inferoseptal, basal anterior, basal inferior and basal   inferoseptal. The following left ventricular wall segments are akinetic:   mid anterior, apical anterior, mid anterolateral, apical lateral, apical   septal, mid inferoseptal, apex and mid anteroseptal.  · The findings are consistent with dilated cardiomyopathy.  · Normal right ventricular cavity size and wall thickness with moderately   reduced right ventricular systolic function noted; abnormal septal motion   consistent with RV pacemaker.  · The aortic valve exhibits mild sclerosis.  · No evidence of pulmonary hypertension is present.  · There is a trivial pericardial effusion adjacent to the left ventricle.          Assessment/Plan   Assessment /  Plan     Active Hospital Problems    Diagnosis POA   • HCAP (healthcare-associated pneumonia) [J18.9] Yes   • Acute on chronic congestive heart failure (CMS/HCC) [I50.9] Yes   • Hypothyroidism (acquired) [E03.9] Yes   • Dementia [F03.90] Yes   • Elevated troponin [R74.8] Yes   • Pacemaker [Z95.0] Yes   • Acute and chronic respiratory failure with hypoxia (CMS/[facility]) [J96.21] Yes   • CKD (chronic kidney disease) stage 3, GFR 30-59 ml/min (CMS/[facility]) [N18.3] Yes   • Idiopathic cardiomyopathy (CMS/[facility]) [I42.8] Yes     1. 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   a. Left bundle branch block.  b. History of nonsustained ventricular ectopy.  c. “Normal” coronary arteries by catheterization, October 2003, echo ejection fraction of 30%, October 2003.  d. Echocardiogram, 01/27/2004:  Global hypokinesis, ejection fraction 25%, moderate mitral regurgitation.  e. [employer] BI-V pacemaker implantation by Dr. Naidu on 04/16/2010 also with a coronary sinus lead placement.    f. Patient refuses upgrade to an ICD and Coumadin, March 2010.    g. Atrial fibrillation with “failed” cardioversion (early recurrence), January 2010.  h. Biventricular pacemaker generator change, 10/30/2015.         • Hyperlipidemia [E78.5] Yes   • Essential hypertension [I10] Yes       1.  Acute on chronic hypoxic respiratory failure  - will continue supplemental oxygen to keep sats>91%  - duonebs prn    2. HCAP  - continue vanc/zosyn, consult ID  - sputum, blood cultures  - lactate pending    3. Acute on chronic CHF  - IV lasix prn, scheduled lasix PO  - consider cardiology consult pending clinical course    4.  Anemia  - transfuse 1 unit PRBC now    5. Hypothyroidism  - resume synthroid    6. Left renal mass  - consider hem/onc consult    7. Hypertension  - resume home meds    8. Dementia/FTT        DVT prophylaxis:  SCDs, heparin SQ    CODE STATUS:    Code Status and Medical Interventions:   Ordered at: 09/04/19 0657      Code Status:    No CPR     Medical Interventions (Level of Support Prior to Arrest):    Comfort Measures     Comments:    per advanced directive on chart       Admission Status:  I believe this patient meets INPATIENT status due to the need for care which can only be reasonably provided in an hospital setting due to HCAP, requiring IV antibiotics, oxygen, infectious disease consult.  As such, I feel patient’s risk for adverse outcomes and need for care warrant INPATIENT evaluation and predict the patient’s care encounter to likely last beyond 2 midnights.        Electronically signed by NASIM Sands, 09/04/19, 6:44 AM.      Brief Attending Admission Attestation     I have seen and examined the patient, performing an independent face-to-face diagnostic evaluation with plan of care reviewed and developed with the advanced practice clinician (APC).      Brief Summary Statement:   Imelda Maldonado is a 89 y.o. female with hx of dementia, CAD, chronic systolic CHF (EF 25%) s/p BiV-ICD, HTN, HLD, remote DVT, GI bleed in 2013, hx of C.diff, and Afib (not anticoagulated due to hx of GI bleed) who presents due to SOA and hypoxia. Recent hospitalization 8/19-8/21 due to CHF exacerbation. CTA chest in the ER showed no PE but moderate bilateral pleural effusions, alveolar disease worse on the left with near complete consolidation of the left lower lobe. proBNP elevated at 7535 with mild elevation in troponin 0.033.  Admitted for treatment of CHF and HCAP.    Remainder of detailed HPI is as noted above and has been reviewed and/or edited by me for completeness.      Attending Physical Exam:  Constitutional: Awake, alert, frail appearance  Eyes: PERRLA, sclerae anicteric, no conjunctival injection  HENT: NCAT, mucous membranes moist  Neck: Supple, no thyromegaly, no lymphadenopathy, trachea midline  Respiratory: coarse breath sounds bilaterally, nonlabored respirations   Cardiovascular: RRR, no murmurs, rubs, or gallops,  palpable pedal pulses bilaterally  Gastrointestinal: Positive bowel sounds, soft, nontender, nondistended  Musculoskeletal: No bilateral ankle edema, no clubbing or cyanosis to extremities  Psychiatric: Appropriate affect, cooperative, pleasant  Neurologic: Oriented x 1, answers simple questions appropriately, strength symmetric in all extremities, Cranial Nerves grossly intact to confrontation, speech clear  Skin: No rashes        Brief Assessment/Plan :  See above for further detailed assessment and plan developed with APC which I have reviewed and/or edited for completeness.    PLAN:  --Intermittent diuresis. Cardiology consulted.   --Continue HCAP coverage. ID consulted. Have stopped Vanc and will continue on Zosyn for now.  --Transfuse 1 unit for Hb 7.1. No signs of acute blood loss. Monitor.  --She has recurrent hospitalizations for CHF exacerbations, this is her second one in just 2 weeks. May want to involve Palliative Care at some point given her advanced age, dementia, and multiple comorbidities.  --Incidental finding of left adrenal mass on imaging. Will defer further workup at this time, can be pursued as outpatient if desired by family.    Electronically signed by Beverly Glasgow MD, 09/04/19, 12:22 PM

## 2019-09-04 NOTE — PLAN OF CARE
Problem: Fall Risk (Adult)  Goal: Identify Related Risk Factors and Signs and Symptoms  Outcome: Ongoing (interventions implemented as appropriate)  Nurse spoke with patient's daughter in [place] r/t consent for 1x blood transfusion, daughter request for dr. pruitt to call her tomorrow and update on patient condition.    Goal: Absence of Fall  Outcome: Ongoing (interventions implemented as appropriate)      Problem: Patient Care Overview  Goal: Individualization and Mutuality  Outcome: Ongoing (interventions implemented as appropriate)    Goal: Discharge Needs Assessment  Outcome: Ongoing (interventions implemented as appropriate)    Goal: Interprofessional Rounds/Family Conf  Outcome: Ongoing (interventions implemented as appropriate)      Problem: Breathing Pattern Ineffective (Adult)  Goal: Identify Related Risk Factors and Signs and Symptoms  Outcome: Ongoing (interventions implemented as appropriate)    Goal: Effective Oxygenation/Ventilation  Outcome: Ongoing (interventions implemented as appropriate)    Goal: Anxiety/Fear Reduction  Outcome: Ongoing (interventions implemented as appropriate)      Problem: Confusion, Chronic (Adult)  Goal: Identify Related Risk Factors and Signs and Symptoms  Outcome: Ongoing (interventions implemented as appropriate)    Goal: Cognitive/Functional Impairments Minimized  Outcome: Ongoing (interventions implemented as appropriate)    Goal: Free from Harm/Injuries  Outcome: Ongoing (interventions implemented as appropriate)      Problem: Patient Care Overview  Goal: Plan of Care Review  Outcome: Ongoing (interventions implemented as appropriate)    Goal: Individualization and Mutuality  Outcome: Ongoing (interventions implemented as appropriate)    Goal: Discharge Needs Assessment  Outcome: Ongoing (interventions implemented as appropriate)    Goal: Interprofessional Rounds/Family Conf  Outcome: Ongoing (interventions implemented as appropriate)      Problem: Skin Injury Risk  (Adult)  Goal: Identify Related Risk Factors and Signs and Symptoms  Outcome: Ongoing (interventions implemented as appropriate)    Goal: Skin Health and Integrity  Outcome: Ongoing (interventions implemented as appropriate)

## 2019-09-04 NOTE — PLAN OF CARE
Problem: Patient Care Overview  Goal: Plan of Care Review  Outcome: Ongoing (interventions implemented as appropriate)   09/04/19 0940   Coping/Psychosocial   Plan of Care Reviewed With patient   Plan of Care Review   Progress no change   OTHER   Outcome Summary Patient presents with a POA pinpoint old pressure area to her medal left great toe. Wound appears to be an old DTPI. Skin is intact. Just keep foam dressing in place for pressure offloading. Nothing needs to be done from a wound standpoint. Just leave as is. Will sign off. Thanks

## 2019-09-05 PROBLEM — D64.9 CHRONIC ANEMIA: Status: ACTIVE | Noted: 2019-01-01

## 2019-09-05 NOTE — PLAN OF CARE
Problem: Fall Risk (Adult)  Goal: Identify Related Risk Factors and Signs and Symptoms  Outcome: Outcome(s) achieved Date Met: 09/05/19    Goal: Absence of Fall  Outcome: Ongoing (interventions implemented as appropriate)      Problem: Patient Care Overview  Goal: Plan of Care Review  Outcome: Ongoing (interventions implemented as appropriate)   09/05/19 1514   Coping/Psychosocial   Plan of Care Reviewed With patient   Plan of Care Review   Progress no change   OTHER   Outcome Summary VSS. On 4 liters. Patient confused and pulling at things. Complains of shoulder pain but does not want medication.      Goal: Individualization and Mutuality  Outcome: Ongoing (interventions implemented as appropriate)    Goal: Discharge Needs Assessment  Outcome: Ongoing (interventions implemented as appropriate)      Problem: Breathing Pattern Ineffective (Adult)  Goal: Identify Related Risk Factors and Signs and Symptoms  Outcome: Outcome(s) achieved Date Met: 09/05/19    Goal: Effective Oxygenation/Ventilation  Outcome: Ongoing (interventions implemented as appropriate)    Goal: Anxiety/Fear Reduction  Outcome: Ongoing (interventions implemented as appropriate)      Problem: Confusion, Chronic (Adult)  Goal: Identify Related Risk Factors and Signs and Symptoms  Outcome: Outcome(s) achieved Date Met: 09/05/19    Goal: Cognitive/Functional Impairments Minimized  Outcome: Ongoing (interventions implemented as appropriate)    Goal: Free from Harm/Injuries  Outcome: Ongoing (interventions implemented as appropriate)      Problem: Skin Injury Risk (Adult)  Goal: Identify Related Risk Factors and Signs and Symptoms  Outcome: Outcome(s) achieved Date Met: 09/05/19    Goal: Skin Health and Integrity  Outcome: Ongoing (interventions implemented as appropriate)

## 2019-09-05 NOTE — PLAN OF CARE
Problem: Patient Care Overview  Goal: Interprofessional Rounds/Family Conf  Outcome: Ongoing (interventions implemented as appropriate)      09/05/19 1621   Interdisciplinary Rounds/Family Conf   Summary New Palliative Care Consult from Dr. Beverly Glasgow on 9/5/2019 at 1359 for Hospice referral and support for family, seen by Dr. Chuy Cavazos Palliative on 9/5/2019 at 1456 and Emily Mendenhall RN, PN. I called daughter Earlene Fang [phone] and gave her an update on her mother's condition. States her mother has advanced dementia and heart failure. States she does not know her and has had no quality of life. [facility] I told her the hospice nurse would be seeing her mom and will call her after evaluating her. I gave Earlene my cell phone if she wants to get a update on her mother. Palliative will continue to follow for support and symptom management.   Interdisciplinary Rounds/Family Conf   Participants nursing;physician

## 2019-09-05 NOTE — PLAN OF CARE
Problem: Fall Risk (Adult)  Goal: Identify Related Risk Factors and Signs and Symptoms  Outcome: Ongoing (interventions implemented as appropriate)   09/05/19 0328   Fall Risk (Adult)   Related Risk Factors (Fall Risk) confusion/agitation;history of falls;gait/mobility problems;neuro disease/injury;environment unfamiliar   Signs and Symptoms (Fall Risk) presence of risk factors       Problem: Breathing Pattern Ineffective (Adult)  Goal: Identify Related Risk Factors and Signs and Symptoms  Outcome: Ongoing (interventions implemented as appropriate)   09/05/19 0328   Breathing Pattern Ineffective (Adult)   Related Risk Factors (Breathing Pattern Ineffective) advanced age;immobility;infection   Signs and Symptoms (Breathing Pattern Ineffective) accessory muscle use;breathlessness;confusion;breath sounds abnormal     Goal: Effective Oxygenation/Ventilation  Outcome: Ongoing (interventions implemented as appropriate)   09/05/19 0328   Breathing Pattern Ineffective (Adult)   Effective Oxygenation/Ventilation making progress toward outcome     Goal: Anxiety/Fear Reduction  Outcome: Ongoing (interventions implemented as appropriate)   09/05/19 0328   Breathing Pattern Ineffective (Adult)   Anxiety/Fear Reduction making progress toward outcome       Problem: Confusion, Chronic (Adult)  Goal: Identify Related Risk Factors and Signs and Symptoms   09/05/19 0328   Confusion, Chronic (Adult)   Related Risk Factors (Chronic Confusion) cognitive impairment   Signs and Symptoms (Chronic Confusion) thought process diminished/disorganized;disorientation;memory disturbed     Goal: Free from Harm/Injuries  Outcome: Ongoing (interventions implemented as appropriate)   09/05/19 0328   Confusion, Chronic (Adult)   Free from Harm/Injuries making progress toward outcome       Problem: Patient Care Overview  Goal: Plan of Care Review  Outcome: Ongoing (interventions implemented as appropriate)   09/05/19 0328   Coping/Psychosocial   Plan of  Care Reviewed With patient   Plan of Care Review   Progress no change   OTHER   Outcome Summary Pt remains oriented to self only. Frequently asks where she is and why she is here. PRN nebs given for SOA given with relief. Vital signs stable, will continue to monitor.        Problem: Skin Injury Risk (Adult)  Goal: Identify Related Risk Factors and Signs and Symptoms  Outcome: Ongoing (interventions implemented as appropriate)   09/05/19 0328   Skin Injury Risk (Adult)   Related Risk Factors (Skin Injury Risk) advanced age;cognitive impairment;mobility impaired;infection     Goal: Skin Health and Integrity  Outcome: Ongoing (interventions implemented as appropriate)   09/05/19 0328   Skin Injury Risk (Adult)   Skin Health and Integrity making progress toward outcome

## 2019-09-05 NOTE — THERAPY EVALUATION
Acute Care - Speech Language Pathology   Swallow Initial Evaluation [facility]   Clinical Swallow Evaluation       Patient Name: Imelda Maldonado  : 1923  MRN: [id number]  Today's Date: 2019               Admit Date: 2019    Visit Dx:     ICD-10-CM ICD-9-CM   1. HCAP (healthcare-associated pneumonia) J18.9 486   2. Shortness of breath R06.02 786.05   3. Elevated troponin R74.8 790.6   4. Anemia, unspecified type D64.9 285.9     Patient Active Problem List   Diagnosis   • Idiopathic cardiomyopathy (CMS/HCC)   • Arthritis   • Hyperlipidemia   • Gout   • DVT (deep venous thrombosis) (CMS/HCC)   • CRI (chronic renal insufficiency)   • Essential hypertension   • Hypotension   • Acute and chronic respiratory failure with hypoxia (CMS/HCC)   • CKD (chronic kidney disease) stage 3, GFR 30-59 ml/min (CMS/HCC)   • Pacemaker   • Acute on chronic congestive heart failure (CMS/HCC)   • Hypothyroidism (acquired)   • Dementia   • Elevated troponin   • HCAP (healthcare-associated pneumonia)     Past Medical History:   Diagnosis Date   • Acute GI bleeding     2013 - 2013. Status post 7 units of packed red blood cells. Esophagogastroduodenoscopy (no active bleed) and colonoscopy (active bleeding from flat cecal polyps and two ascending colonic polyps) suggestive of non-steroidal anti-inflammatory drug colopathy or mild ischemia. Positive for Clostridium difficile status post 14 days of antibiotics.     • Arthritis     reinitiation of celebrex, 2013   • CRI (chronic renal insufficiency)     mild   • DVT (deep venous thrombosis) (CMS/HCC)     RLE   • Gout    • Histoplasmosis    • Hyperlipidemia    • Hypertension    • Hypotension     symptoms of dizziness   • Idiopathic cardiomyopathy (CMS/HCC)      Past Surgical History:   Procedure Laterality Date   • APPENDECTOMY     • HYSTERECTOMY     • PACEMAKER IMPLANTATION      change out on 10/30/15 without complication   • POLYPECTOMY     • THYROIDECTOMY       chronic replacement therapy        SWALLOW EVALUATION (last 72 hours)      SLP Adult Swallow Evaluation     Row Name 09/05/19 0820                   Rehab Evaluation    Document Type  evaluation  -VO        Subjective Information  complains of hungry/thirsty  -VO        Patient Observations  alert;cooperative  -VO        Patient/Family Observations  pleasantly confused, no family present  -VO        Patient Effort  good  -VO           General Information    Patient Profile Reviewed  yes  -VO        Pertinent History Of Current Problem  adm w/ HCAP, ? asp PNA w/ CHF. dementia @ baseline resides in SNF. Comfort measures per chart.  -VO        Current Method of Nutrition  NPO  -VO        Precautions/Limitations, Vision  WFL;for purposes of eval  -VO        Precautions/Limitations, Hearing  WFL;for purposes of eval  -VO        Prior Level of Function-Communication  cognitive-linguistic impairment;other (see comments) dementia at baseline  -VO        Prior Level of Function-Swallowing  no diet consistency restrictions  -VO        Plans/Goals Discussed with  patient  -VO        Barriers to Rehab  cognitive status  -VO        Patient's Goals for Discharge  return to PO diet  -VO           Pain Assessment    Additional Documentation  Pain Scale: FACES Pre/Post-Treatment (Group)  -VO           Pain Scale: FACES Pre/Post-Treatment    Pain: FACES Scale, Pretreatment  0-->no hurt  -VO        Pain: FACES Scale, Post-Treatment  0-->no hurt  -VO           Oral Motor and Function    Dentition Assessment  natural, present and adequate;missing teeth  -VO        Secretion Management  WNL/WFL  -VO        Mucosal Quality  moist, healthy  -VO        Volitional Swallow  WFL  -VO        Volitional Cough  WFL  -VO           Oral Musculature and Cranial Nerve Assessment    Oral Motor General Assessment  generalized oral motor weakness  -VO           General Eating/Swallowing Observations    Respiratory Support Currently in Use  room air  -VO         Eating/Swallowing Skills  self-fed;rate is too fast  -VO        Positioning During Eating  upright 90 degree;upright in bed  -VO        Utensils Used  spoon;cup;straw  -VO        Consistencies Trialed  thin liquids;pureed;regular textures  -VO           Respiratory    Respiratory Status  WFL  -VO           Clinical Swallow Eval    Oral Prep Phase  impaired  -VO        Oral Transit  WFL  -VO        Oral Residue  WFL  -VO        Pharyngeal Phase  WFL  -VO        Esophageal Phase  unremarkable  -VO        Clinical Swallow Evaluation Summary  CSE completed this AM: Pt in w/ pneumonia w/ concern for aspiration. Dementia @ baseline and comfort measures per chart w/ no family present. Trialed thins via cup/str, puree, and cracker. Wet vocal quality x2 w/ large consec sips of thins. None w/ small, single sips of thins or any other trials. Incr'd mastication time 2' missing teeth and generalized wkns. Rec: soft whole diet, thin liquids (small, single sips only) w/ aspiration precautions in place and supervision/assistance feeding (2' impulsivity. Meds whole w/ pudding. Given concern for aspiration, will f/u for diet tolerance and education w/ family/patient before signing off.   -VO           Oral Prep Concerns    Oral Prep Concerns  prolonged mastication  -VO        Prolonged Mastication  regular consistencies;other (see comments) 2' missing teeth  -VO           Clinical Impression    SLP Swallowing Diagnosis  functional oral phase;functional pharyngeal phase  -VO        Functional Impact  no impact on function  -VO        Rehab Potential/Prognosis, Swallowing  good, to achieve stated therapy goals  -VO        Swallow Criteria for Skilled Therapeutic Interventions Met  demonstrates skilled criteria  -VO           Recommendations    Therapy Frequency (Swallow)  PRN  -VO        Predicted Duration Therapy Intervention (Days)  1 week  -VO        SLP Diet Recommendation  soft textures;whole;thin liquids  -VO         Recommended Precautions and Strategies  upright posture during/after eating;small bites of food and sips of liquid  -VO        SLP Rec. for Method of Medication Administration  meds whole;with pudding or applesauce;as tolerated  -VO        Monitor for Signs of Aspiration  yes;notify SLP if any concerns  -VO        Anticipated Dischage Disposition  unknown  -VO          User Key  (r) = Recorded By, (t) = Taken By, (c) = Cosigned By    Initials Name Effective Dates    VO Shellie Valdez MA,CCC-SLP 10/24/18 -           EDUCATION  The patient has been educated in the following areas:   Dysphagia (Swallowing Impairment) Oral Care/Hydration Modified Diet Instruction.    SLP Recommendation and Plan  SLP Swallowing Diagnosis: functional oral phase, functional pharyngeal phase  SLP Diet Recommendation: soft textures, whole, thin liquids  Recommended Precautions and Strategies: upright posture during/after eating, small bites of food and sips of liquid  SLP Rec. for Method of Medication Administration: meds whole, with pudding or applesauce, as tolerated     Monitor for Signs of Aspiration: yes, notify SLP if any concerns     Swallow Criteria for Skilled Therapeutic Interventions Met: demonstrates skilled criteria  Anticipated Dischage Disposition: unknown  Rehab Potential/Prognosis, Swallowing: good, to achieve stated therapy goals  Therapy Frequency (Swallow): PRN  Predicted Duration Therapy Intervention (Days): 1 week       Plan of Care Reviewed With: patient    SLP GOALS     Row Name 09/05/19 0820             Oral Nutrition/Hydration Goal 1 (SLP)    Oral Nutrition/Hydration Goal 1, SLP  LTG: Pt will tolerate recommended diet w/o overt s/sxs aspiration or discomfort w/ 100% accuracy.  -VO      Time Frame (Oral Nutrition/Hydration Goal 1, SLP)  by discharge  -VO      Progress/Outcomes (Oral Nutrition/Hydration Goal 1, SLP)  goal ongoing  -VO         Oral Nutrition/Hydration Goal 2 (SLP)    Oral Nutrition/Hydration Goal  2, SLP  Pt will tolerate soft whole foods and thins (small single sips) w/o overt s/s aspiration or discomfort w/ 100% accuracy.  -VO      Time Frame (Oral Nutrition/Hydration Goal 2, SLP)  short term goal (STG)  -VO      Progress/Outcomes (Oral Nutrition/Hydration Goal 2, SLP)  goal ongoing  -VO         Swallow Compensatory Strategies Goal 1 (SLP)    Activity (Swallow Compensatory Strategies/Techniques Goal 1, SLP)  compensatory strategies;during meal intake;small bites;small cup sips;small straw sips;other (see comments) basic aspiration precautions  -VO      Freeman/Accuracy (Swallow Compensatory Strategies/Techniques Goal 1, SLP)  with minimal cues (75-90% accuracy)  -VO      Time Frame (Swallow Compensatory Strategies/Techniques Goal 1, SLP)  short term goal (STG)  -VO      Progress/Outcomes (Swallow Compensatory Strategies/Techniques Goal 1, SLP)  goal ongoing  -VO        User Key  (r) = Recorded By, (t) = Taken By, (c) = Cosigned By    Initials Name Provider Type    VO Shellie Valdez MA,CCC-SLP Speech and Language Pathologist             Time Calculation:   Time Calculation- SLP     Row Name 09/05/19 0949             Time Calculation- SLP    SLP Start Time  0820  -VO      SLP Received On  09/05/19  -VO        User Key  (r) = Recorded By, (t) = Taken By, (c) = Cosigned By    Initials Name Provider Type    Shellie Hunt MA,CCC-SLP Speech and Language Pathologist          Therapy Charges for Today     Code Description Service Date Service Provider Modifiers Qty    [id number]  ST EVAL ORAL PHARYNG SWALLOW 3 9/5/2019 Shellie Valdez MA,CCC-SLP GN 1               Shellie Valdez MA,CCC-SLP  9/5/2019

## 2019-09-05 NOTE — PROGRESS NOTES
Continued Stay Note  [facility]     Patient Name: Imelda Maldonado  MRN: [id number]  Today's Date: 9/5/2019    Admit Date: 9/4/2019    Discharge Plan     Row Name 09/05/19 1028       Plan    Plan  [facility]     Patient/Family in Agreement with Plan  yes    Plan Comments  Visited with patient at bedside - her goal is to return to [facility]. Discussed in rounds that palliative care may be consulted after MD speaks with daughter. CM will be following, she will likely need [facility] at discharge. 11 days remaining on bedhold - opal [phone]         Discharge Codes    No documentation.             Opal Faith RN

## 2019-09-05 NOTE — CONSULTS
Leyla Castro DO  Consulting physician: Pee    Chief Complaint   Patient presents with   • Shortness of Breath         HPI: Patient is a 89-year-old female brought in hospital due to dyspnea.  Patient has been found to have end-stage cardiac disease.  Patient does have a history of dementia and apparently lives in a nursing facility.  Patient himself is a poor historian so most of the HPI comes from chart review.      Past Medical History:   Diagnosis Date   • Acute GI bleeding     12/09/2013 - 12/24/2013. Status post 7 units of packed red blood cells. Esophagogastroduodenoscopy (no active bleed) and colonoscopy (active bleeding from flat cecal polyps and two ascending colonic polyps) suggestive of non-steroidal anti-inflammatory drug colopathy or mild ischemia. Positive for Clostridium difficile status post 14 days of antibiotics.     • Arthritis     reinitiation of celebrex, October 2013   • CRI (chronic renal insufficiency)     mild   • DVT (deep venous thrombosis) (CMS/HCC)     RLE   • Gout    • Histoplasmosis    • Hyperlipidemia    • Hypertension    • Hypotension     symptoms of dizziness   • Idiopathic cardiomyopathy (CMS/HCC)      Past Surgical History:   Procedure Laterality Date   • APPENDECTOMY     • HYSTERECTOMY     • PACEMAKER IMPLANTATION      change out on 10/30/15 without complication   • POLYPECTOMY     • THYROIDECTOMY      chronic replacement therapy     Current Code Status     Date Active Code Status Order ID Comments User Context       9/4/2019 06:57 No CPR [id number]  Adela Gomez PA Inpatient       Questions for Current Code Status     Question Answer Comment    Code Status No CPR     Medical Interventions (Level of Support Prior to Arrest) Comfort Measures     Comments per advanced directive on chart         Current Facility-Administered Medications   Medication Dose Route Frequency Provider Last Rate Last Dose   • acetaminophen (TYLENOL) tablet 650 mg  650 mg Oral Q6H PRN Pee  Beverly GUZMAN MD   650 mg at 09/04/19 1758   • carvedilol (COREG) tablet 12.5 mg  12.5 mg Oral BID With Meals Beverly Glasgow MD   12.5 mg at 09/05/19 0852   • diclofenac (VOLTAREN) 1 % gel 4 g  4 g Topical BID PRN Beverly Glasgow MD       • donepezil (ARICEPT) tablet 5 mg  5 mg Oral Nightly Beverly Glasgow MD   5 mg at 09/04/19 2124   • ferrous sulfate tablet 325 mg  325 mg Oral Daily With Breakfast Beverly Glasgow MD   325 mg at 09/05/19 0852   • folic acid (FOLVITE) tablet 1 mg  1 mg Oral Daily Beverly Glasgow MD   1 mg at 09/05/19 0852   • furosemide (LASIX) injection 20 mg  20 mg Intravenous Q12H Beverly Glasgow MD       • ipratropium-albuterol (DUO-NEB) nebulizer solution 3 mL  3 mL Nebulization Q4H PRN Adela Gomez PA   3 mL at 09/05/19 0038   • ipratropium-albuterol (DUO-NEB) nebulizer solution 3 mL  3 mL Nebulization 4x Daily - RT Beverly Glasgow MD   3 mL at 09/05/19 1200   • levothyroxine (SYNTHROID, LEVOTHROID) tablet 50 mcg  50 mcg Oral Q AM Beverly Glasgow MD   50 mcg at 09/05/19 0522   • lisinopril (PRINIVIL,ZESTRIL) tablet 2.5 mg  2.5 mg Oral Daily Beverly Glasgow MD   2.5 mg at 09/05/19 0852   • mirtazapine (REMERON) tablet 15 mg  15 mg Oral Daily Beverly Glasgow MD   15 mg at 09/05/19 0851   • Pharmacy Consult - MTM   Does not apply Daily Scott De La [facility]   Stopped at 09/04/19 1514   • piperacillin-tazobactam (ZOSYN) 3.375 g in iso-osmotic dextrose 50 ml (premix)  3.375 g Intravenous Q8H Adela Gomez PA   3.375 g at 09/05/19 0900   • sertraline (ZOLOFT) tablet 50 mg  50 mg Oral Daily Beverly Glasgow MD   50 mg at 09/05/19 0852   • sodium chloride 0.9 % flush 10 mL  10 mL Intravenous PRN Dung Valdez MD   10 mL at 09/04/19 0951        •  acetaminophen  •  diclofenac  •  ipratropium-albuterol  •  sodium chloride  No Known Allergies  Family History   Problem Relation Age of Onset   • Arthritis Other    • Diabetes Other    • Heart disease Other    • Hypertension Other    •  "Thyroid disease Other      Social History     Socioeconomic History   • Marital status:      Spouse name: Not on file   • Number of children: Not on file   • Years of education: Not on file   • Highest education level: Not on file   Tobacco Use   • Smoking status: Former Smoker     Types: Cigarettes   • Smokeless tobacco: Never Used   Substance and Sexual Activity   • Drug use: No   • Sexual activity: Defer     Review of Systems -all others reviewed and are negative except mentioned in the HPI      BP 98/59 (BP Location: Right arm, Patient Position: Lying)   Pulse 74   Temp 97.9 °F (36.6 °C) (Oral)   Resp 18   Ht 162.6 cm (64.02\")   Wt 60.5 kg (133 lb 6.1 oz)   LMP  (LMP Unknown)   SpO2 97%   BMI 22.88 kg/m²     Intake/Output Summary (Last 24 hours) at 9/5/2019 1453  Last data filed at 9/5/2019 0900  Gross per 24 hour   Intake 489 ml   Output 1650 ml   Net -1161 ml     Physical Exam:      General Appearance:    Alert, cooperative, in no acute distress   Head:    Normocephalic, without obvious abnormality, atraumatic   Eyes:            Lids and lashes normal, conjunctivae and sclerae normal, no   icterus, no pallor, corneas clear, PERRLA   Ears:    Ears appear intact with no abnormalities noted   Throat:   No oral lesions, no thrush, oral mucosa moist   Neck:   No adenopathy, supple, trachea midline, no thyromegaly, no     carotid bruit, no JVD   Back:     No kyphosis present, no scoliosis present, no skin lesions,       erythema or scars, no tenderness to percussion or                   palpation,   range of motion normal   Lungs:     Clear to auscultation,respirations regular, even and                   unlabored    Heart:    Regular rhythm and normal rate, normal S1 and S2, no            murmur, no gallop, no rub, no click   Breast Exam:    Deferred   Abdomen:     Normal bowel sounds, no masses, no organomegaly, soft        non-tender, non-distended, no guarding, no rebound                 " tenderness   Genitalia:    Deferred   Extremities:   Moves all extremities well, no edema, no cyanosis, no              redness   Pulses:   Pulses palpable and equal bilaterally   Skin:   No bleeding, bruising or rash   Lymph nodes:   No palpable adenopathy   Neurologic:   Cranial nerves 2 - 12 grossly intact, sensation intact, DTR        present and equal bilaterally       Results from last 7 days   Lab Units 09/05/19  0424   WBC 10*3/mm3 5.21   HEMOGLOBIN g/dL 8.7*   HEMATOCRIT % 28.7*   PLATELETS 10*3/mm3 172     Results from last 7 days   Lab Units 09/05/19 0425  09/04/19  0357   SODIUM mmol/L 141  --  142   POTASSIUM mmol/L 4.6  --  4.9   CHLORIDE mmol/L 104  --  106   CO2 mmol/L 24.0  --  25.0   BUN mg/dL 40*  --  33*   CREATININE mg/dL 1.28*   < > 1.06*   CALCIUM mg/dL 7.0*  --  7.4*   BILIRUBIN mg/dL  --   --  0.3  0.3   ALK PHOS U/L  --   --  84  84   ALT (SGPT) U/L  --   --  <5  <5   AST (SGOT) U/L  --   --  16  16   GLUCOSE mg/dL 70  --  91    < > = values in this interval not displayed.     Results from last 7 days   Lab Units 09/05/19 0425   SODIUM mmol/L 141   POTASSIUM mmol/L 4.6   CHLORIDE mmol/L 104   CO2 mmol/L 24.0   BUN mg/dL 40*   CREATININE mg/dL 1.28*   GLUCOSE mg/dL 70   CALCIUM mg/dL 7.0*     Imaging Results (last 72 hours)     Procedure Component Value Units Date/Time    XR Chest 1 View [[id number]] Collected:  09/05/19 0025     Updated:  09/05/19 0027    Narrative:       Chest x-ray portable    Suspected fluid overload. History of hypertension and CHF.    Single frontal portable view of the chest timed 1150 on 9/4/2019 and compared to 4:35 AM same day.    FINDINGS: Globular cardiac silhouette enlargement with left-sided pacer again seen. Continued interstitial edema and airspace disease predominantly centrally and asymmetrically worse on the left with left pleural fluid not excluded. On comparison to  prior, mild improvement in airspace disease and volume status. No pneumothorax.       Impression:       1. Mild improvement on comparison earlier film. Continued follow-up recommended.    Signer Name: Rosario Sullivan MD   Signed: 9/5/2019 12:25 AM   Workstation Name: [facility]    Radiology [facility]    XR Chest 1 View [[id number]] Collected:  09/04/19 0515     Updated:  09/04/19 0517    Narrative:       CR Chest 1 Vw    INDICATION:   Short of air and hypoxia     COMPARISON:    Chest x-ray 8/19/2019.    FINDINGS:  Single portable AP view(s) of the chest.  Globular cardiomediastinal silhouette enlargement and left-sided pacer/fibrillator again seen. Severe alveolar disease left greater than right. See accompanying CT chest dictation. No pneumothorax.      Impression:       Globular cardiomediastinal silhouette enlargement with severe left greater the right alveolar disease. Left-sided pacer defibrillator.    Signer Name: Rosario Sullivan MD   Signed: 9/4/2019 5:15 AM   Workstation Name: [facility]    CT Angiogram Chest With Contrast [[id number]] Collected:  09/04/19 0514     Updated:  09/04/19 0516    Narrative:       INDICATION:   Hypoxic, short of air, recent fracture right arm.    TECHNIQUE:   CT angiogram of the chest with contrast. 3-D reformatted images were acquired.  Radiation dose reduction techniques included automated exposure control or exposure modulation based on body size. Radiation audit for number of CT and nuclear cardiology  exams performed in the last year:  0. Dose recorded in the patient's chart.    COMPARISON:   Chest x-ray from 8/19/2019.    FINDINGS:   There is no evidence for pulmonary embolism. There is globular cardiac silhouette enlargement with small to moderate pericardial effusion. There is moderate right-sided pleural effusion. There is likely moderate to large left pleural effusion with  pleural fluid seen at the apex.. There is atherosclerotic vascular calcification involving the thoracic aorta and great vessels. The timing  of the contrast bolus because the patient's heart failure does not allow for assessment for thoracic aortic  dissection. The patient has a pacemaker.    There is severe alveolar disease left lung with near complete consolidation of the left lower lobe and considerable consolidation of the left upper lobe. Please correlate for clinical evidence of aspiration or pneumonia. There is patchier airspace  disease in the right lung including the right middle lobe, right lower lobe and right upper lobe. Many of the opacities are nodular and the possibility of metastatic disease or atypical infectious disease should also be considered.. Please correlate with  the patient's history. This study is not sensitive for adenopathy given the amount of pleural and parenchymal disease. There is at least partially calcified subcarinal node up to about 3.2 x 2.2 cm in dimension. Compression fracture seen at the  thoracolumbar junction. There may be a left adrenal mass lesion 3.4 x 2.4 cm. This is partly in the field-of-view.      Impression:       No evidence for pulmonary most.  2. Globular cardiac silhouette enlargement.  3. Bilateral pleural effusions.  4. Extensive alveolar disease asymmetrically worse on the left than the right. In addition to the airspace disease there are multiple bilateral nodular lesions in the lungs. This could be infectious or inflammatory in etiology but metastatic disease is  also in the differential.  5. Partially calcified subcarinal lymph node.  6. No pneumothorax  7. Concern for left adrenal mass partly in the field-of-view.    Signer Name: Rosario Sullivan MD   Signed: 9/4/2019 5:14 AM   Workstation Name: [email or web address]    [facility]        Impression: CHF  Dyspnea  Dementia  [facility]      Plan: We will plan on seeing how patient does over the next 24 to possibly 48 hours to better assess whether she is inpatient hospice appropriate versus outpatient hospice appropriate.  If she does have a  long-term care bed at the nursing facility outpatient hospice may be the most appropriate.        Chuy Cavazos DO  09/05/19  2:53 PM

## 2019-09-05 NOTE — PLAN OF CARE
Problem: Patient Care Overview  Goal: Plan of Care Review  Outcome: Ongoing (interventions implemented as appropriate)   09/05/19 0985   Coping/Psychosocial   Plan of Care Reviewed With patient   SLP evaluation completed. Will continue to address dysphagia through diet tolerance/education. Please see note for further details and recommendations.

## 2019-09-05 NOTE — PROGRESS NOTES
Continued Stay Note  [facility]     Patient Name: Imelda Maldonado  MRN: [id number]  Today's Date: 9/5/2019    Admit Date: 9/4/2019    Discharge Plan     Row Name 09/05/19 1639       Plan    Plan  undetermined    Plan Comments  Referral received, reviewed pt's chart. Telephone call to pt's daughter Earlene, who lives in [place]. Pat requested this writer call tomorrow morning to discuss hospice services for pt. Will continue to follow. Please call [phone] if can be of further assistance.        Discharge Codes    No documentation.             Barbi Reed RN

## 2019-09-05 NOTE — PROGRESS NOTES
[facility] Medicine Services  PROGRESS NOTE    Patient Name: Imelda Maldonado  : 1923  MRN: [id number]    Date of Admission: 2019  Length of Stay: 1  Primary Care Physician: Leyla Castro DO    Subjective   Subjective     CC:  F/U SOA, hypoxia    HPI:  Patient seen this morning. No complaints, says she feels better. Slept well last night.     Review of Systems  CV-no chest pain, no palpitations  Resp-no cough, no dyspnea  GI-no N/V/D, no abd pain    All other systems reviewed and negative except any additional pertinent positives and negatives as discussed in HPI.    Objective   Objective     Vital Signs:   Temp:  [97 °F (36.1 °C)-98.1 °F (36.7 °C)] 97.9 °F (36.6 °C)  Heart Rate:  [74-81] 74  Resp:  [17-24] 18  BP: ()/(46-59) 98/59        Physical Exam:  Gen-no acute distress, frail appearance  HENT-NCAT, mucous membranes moist  CV-RRR, S1 S2 normal, no m/r/g  Resp-coarse breath sounds bilaterally, no wheezes, nonlabored on HFNC  Abd-soft, NT, ND, +BS  Ext-no edema  Neuro-A&Ox1, answers simple questions, no focal deficits  Skin-no rashes  Psych-appropriate mood    Results Reviewed:    Results from last 7 days   Lab Units 19  1248 19  0405 19  0357 19  0356   WBC 10*3/mm3 5.21  --   --  6.12  --    HEMOGLOBIN g/dL 8.7*  --   --  7.8*  --    HEMOGLOBIN, POC g/dL  --   --  7.1*  --   --    HEMATOCRIT % 28.7*  --   --  27.3*  --    HEMATOCRIT POC %  --   --  21*  --   --    PLATELETS 10*3/mm3 172  --   --  181  --    INR   --   --   --   --  1.27*   PROCALCITONIN ng/mL  --  0.15  --   --   --      Results from last 7 days   Lab Units 19  0639 19  0405 19  0357   SODIUM mmol/L 141  --   --  142   POTASSIUM mmol/L 4.6  --   --  4.9   CHLORIDE mmol/L 104  --   --  106   CO2 mmol/L 24.0  --   --  25.0   BUN mg/dL 40*  --   --  33*   CREATININE mg/dL 1.28*  --  1.30 1.06*   GLUCOSE mg/dL 70  --   --  91    CALCIUM mg/dL 7.0*  --   --  7.4*   ALT (SGPT) U/L  --   --   --  <5  <5   AST (SGOT) U/L  --   --   --  16  16   TROPONIN T ng/mL  --  0.024  --  0.033*   PROBNP pg/mL  --   --   --  7,535.0*     Estimated Creatinine Clearance: 24.6 mL/min (A) (by C-G formula based on SCr of 1.28 mg/dL (H)).    Microbiology Results Abnormal     Procedure Component Value - Date/Time    Blood Culture - Blood, Arm, Left [[id number]] Collected:  09/04/19 0530    Lab Status:  Preliminary result Specimen:  Blood from Arm, Left Updated:  09/05/19 0645     Blood Culture No growth at 24 hours    Blood Culture - Blood, Arm, Right [[id number]] Collected:  09/04/19 0500    Lab Status:  Preliminary result Specimen:  Blood from Arm, Right Updated:  09/05/19 0645     Blood Culture No growth at 24 hours    MRSA Screen, PCR - Swab, Nares [[id number]]  (Normal) Collected:  09/04/19 1800    Lab Status:  Final result Specimen:  Swab from Nares Updated:  09/1934     MRSA PCR Negative    Narrative:       MRSA Negative          Imaging Results (last 24 hours)     Procedure Component Value Units Date/Time    XR Chest 1 View [[id number]] Collected:  09/05/19 0025     Updated:  09/05/19 0027    Narrative:       Chest x-ray portable    Suspected fluid overload. History of hypertension and CHF.    Single frontal portable view of the chest timed 1150 on 9/4/2019 and compared to 4:35 AM same day.    FINDINGS: Globular cardiac silhouette enlargement with left-sided pacer again seen. Continued interstitial edema and airspace disease predominantly centrally and asymmetrically worse on the left with left pleural fluid not excluded. On comparison to  prior, mild improvement in airspace disease and volume status. No pneumothorax.      Impression:       1. Mild improvement on comparison earlier film. Continued follow-up recommended.    Signer Name: Rosario Sullivan MD   Signed: 9/5/2019 12:25 AM   [email or web address]    [facility]           Results for orders placed during the hospital encounter of 08/19/19   Adult Transthoracic Echo Complete With Contrast if Necessary Per Protocol    Narrative · The left ventricular cavity is moderate-to-severely dilated.  · Left atrial cavity size is moderate-to-severely dilated.  · Moderate aortic valve regurgitation is present.  · Moderate-to-severe mitral valve regurgitation is present.  · Mild tricuspid valve regurgitation is present.  · Mild pulmonic valve regurgitation is present.  · Mild dilation of the aortic root and of the sinuses Valsalva present.  · Left ventricular wall thickness is consistent with mild concentric   hypertrophy.  · Estimated EF = 24%.  · Left ventricular systolic function is severely decreased.  · The following left ventricular wall segments are hypokinetic: basal   anterolateral, basal inferolateral, mid inferolateral, apical inferior,   mid inferior, basal inferoseptal, basal anterior, basal inferior and basal   inferoseptal. The following left ventricular wall segments are akinetic:   mid anterior, apical anterior, mid anterolateral, apical lateral, apical   septal, mid inferoseptal, apex and mid anteroseptal.  · The findings are consistent with dilated cardiomyopathy.  · Normal right ventricular cavity size and wall thickness with moderately   reduced right ventricular systolic function noted; abnormal septal motion   consistent with RV pacemaker.  · The aortic valve exhibits mild sclerosis.  · No evidence of pulmonary hypertension is present.  · There is a trivial pericardial effusion adjacent to the left ventricle.          I have reviewed the medications:  Scheduled Meds:  carvedilol 12.5 mg Oral BID With Meals   donepezil 5 mg Oral Nightly   ferrous sulfate 325 mg Oral Daily With Breakfast   folic acid 1 mg Oral Daily   furosemide 20 mg Intravenous Q12H   ipratropium-albuterol 3 mL Nebulization 4x Daily - RT   levothyroxine 50 mcg Oral Q AM   lisinopril 2.5 mg Oral Daily    mirtazapine 15 mg Oral Daily   pharmacy consult - MTM  Does not apply Daily   piperacillin-tazobactam 3.375 g Intravenous Q8H   sertraline 50 mg Oral Daily     Continuous Infusions:   PRN Meds:.•  acetaminophen  •  diclofenac  •  ipratropium-albuterol  •  sodium chloride      Assessment/Plan   Assessment / Plan     Active Hospital Problems    Diagnosis  POA   • **Acute on chronic congestive heart failure (CMS/HCC) [I50.9]  Yes   • Acute on chronic anemia [D64.9]  Yes   • HCAP (healthcare-associated pneumonia) [J18.9]  Yes   • Hypothyroidism (acquired) [E03.9]  Yes   • Dementia [F03.90]  Yes   • Elevated troponin [R74.8]  Yes   • Pacemaker [Z95.0]  Yes   • Acute and chronic respiratory failure with hypoxia (CMS/HCC) [J96.21]  Yes   • CKD (chronic kidney disease) stage 3, GFR 30-59 ml/min (CMS/HCC) [N18.3]  Yes   • Idiopathic cardiomyopathy (CMS/HCC) [I42.8]  Yes   • Hyperlipidemia [E78.5]  Yes   • Essential hypertension [I10]  Yes      Resolved Hospital Problems   No resolved problems to display.        Brief Hospital Course to date:  Imelda Maldonado is a 89 y.o. female with hx of dementia, CAD, chronic systolic CHF (EF 25%) s/p BiV-ICD, HTN, HLD, remote DVT, GI bleed in 2013, hx of C.diff, and Afib (not anticoagulated due to hx of GI bleed) who presents due to SOA and hypoxia. Recent hospitalization 8/19-8/21 due to CHF exacerbation. CTA chest in the ER showed no PE but moderate bilateral pleural effusions, alveolar disease worse on the left with near complete consolidation of the left lower lobe. proBNP elevated at 7535 with mild elevation in troponin 0.033.  Admitted for treatment of CHF and HCAP.     PLAN:  --Continue Zosyn for now. ID following. Blood cultures NGTD.  --Appreciate Cardiology seeing patient. Dr. Cardozo has nothing else to add. Recommends hospice for end stage heart failure.  --Anemia improved s/p 1 unit PRBC on admission. No signs of acute blood loss. Monitor.   --Discussed with daughter Earlene by  phone (lives [place] of state). Agreeable to Palliative and Hospice consults. Will continue with ATBx and IV Lasix in the mean time, attempt to wean oxygen.     DVT Prophylaxis:  mechanical    Disposition: I expect the patient to be discharged TBD[facility]    CODE STATUS:   Code Status and Medical Interventions:   Ordered at: 09/04/19 0657     Code Status:    No CPR     Medical Interventions (Level of Support Prior to Arrest):    Comfort Measures     Comments:    per advanced directive on chart         Electronically signed by Beverly Glasgow MD, 09/05/19, 2:07 PM.

## 2019-09-05 NOTE — NURSING NOTE
Patient Name:  Imelda Maldonado  :  1923  DOS:  19    Active Hospital Problems    Diagnosis   • HCAP (healthcare-associated pneumonia)   • Acute on chronic congestive heart failure (CMS/HCC)   • Hypothyroidism (acquired)   • Dementia   • Elevated troponin   • Pacemaker   • Acute and chronic respiratory failure with hypoxia (CMS/HCC)   • CKD (chronic kidney disease) stage 3, GFR 30-59 ml/min (CMS/HCC)   • Idiopathic cardiomyopathy (CMS/HCC)     1. 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   a. Left bundle branch block.  b. History of nonsustained ventricular ectopy.  c. “Normal” coronary arteries by catheterization, 2003, echo ejection fraction of 30%, 2003.  d. Echocardiogram, 2004:  Global hypokinesis, ejection fraction 25%, moderate mitral regurgitation.  e. Local Magnet BI-V pacemaker implantation by Dr. Naidu on 2010 also with a coronary sinus lead placement.    f. Patient refuses upgrade to an ICD and Coumadin, 2010.    g. Atrial fibrillation with “failed” cardioversion (early recurrence), 2010.  h. Biventricular pacemaker generator change, 10/30/2015.         • Hyperlipidemia   • Essential hypertension       Consult has been received.  Medical records have been reviewed. Patient with dementia and end stage cardiomyopathy. No family present, so no education provided. Patient resides in SNF. Hospice has been recommended by cardiology. We would be happy to see her in clinic for palliative diuresis if she is able to make it to appointments.     Echo Results: 19  · The left ventricular cavity is moderate-to-severely dilated.  · Left atrial cavity size is moderate-to-severely dilated.  · Moderate aortic valve regurgitation is present.  · Moderate-to-severe mitral valve regurgitation is present.  · Mild tricuspid valve regurgitation is present.  · Mild pulmonic valve regurgitation is present.  · Mild dilation of the aortic  root and of the sinuses Valsalva present.  · Left ventricular wall thickness is consistent with mild concentric hypertrophy.  · Estimated EF = 24%.      NYHA Class:IV    Heart Failure Quality Measures    ACE I, ARB, ARNI (if EF <40%)     Lisinopril    Evidence-based Beta Blocker (EF<40%)    (Bisoprolol, Carvedilol, Metoprolol Succinate)  Yes  coreg    Aldosterone Antagonist (EF <40%)  If no contraindications:  CAIN / CKD / Renal Stenosis    Heart Failure Education    No family present. Patient with dementia    If EF < 35%  Pt has biv PPM. No ICD due to advanced age and comorbid conditions as well as palliative status    Atrial fibrillation / Atrial flutter:  Quality Measure    CHADS-VASc Score:  CHADS-VASc Risk Assessment            7       Total Score        1 CHF    1 Hypertension    2 Age >/= 75    2 PRIOR STROKE/TIA/THROMBO    1 Sex: Female        Criteria that do not apply:    DM    Vascular Disease    Age 65-74            Anticoagulation for CHADS-VASc >/=2    No- bleeding risks    Statin Therapy (for patients with a history of CAD, CVA/TIA, PVD, DM)  No. No evidence of benefit in this advanced age group. Risks likely outweigh benefit

## 2019-09-05 NOTE — PLAN OF CARE
Problem: Palliative Care (Adult)  Intervention: Promote Informed Decision Making and Goal Setting   09/05/19 2970   Coping/Psychosocial Interventions   Life Transition/Adjustment advance care planning facilitated;palliative care discussed;end-of-life care initiated  (Daughter request to return to [facility] when medically ready with [facility] for end of life / comfort care. Do not return back to hospital)

## 2019-09-05 NOTE — PROGRESS NOTES
Infectious Disease Progress Note  Imelda Maldonado  4/29/1923  [id number]    Date of Consult: 9/4/2019  Admission Date: 9/4/2019    Requesting Provider: Beverly GUZMAN MD  Evaluating Physician: Didier Del Real MD    Chief Complaint: shortness of breath, cough    Reason for Consultation: possible pneumonia    History of present illness:   Patient is a 89 y.o.  Yr old female with history of idiopathic cardiomyopathy (EF 20%), osteoarthritis, CKD, DVT, gout, pacemaker/ICD, dementia. Recent admission from 8/19 to 8/21 with heart failure, tx with diuresis.    Presented back to [facility] ED on 9/4 from SNF due to concerns of of worsening shortness of breath and productive cough. Afebrile.  Unfortunately she is not oriented to place or time and is unable to provide any additional history. She is currently on 3 L O2. BP stable. WBC 6. Blood cx pending. CTA concerning for left sided pnuemonia, Started on vancomycin and zosyn. BNP is 7535. Lactate 1.0. ID consulted for assistance with antibiotics. Currently resting comfortably. RN has not appreciated any cough. Patient complains of chronic chest pain.     9/5: Afebrile overnight.  Overall clinically stable.  She is on 5 L of oxygen.  Net negative about 800 mL's.  Tolerating Zosyn.  She complains of mild cough but no chest pain.  She is hungry and would like to eat breakfast    Review of Systems  She is not oriented to place or time.  She denies having any further pain.  Otherwise ROS limited based on memory loss    No Known Allergies    Antibiotics:  Anti-Infectives (From admission, onward)    Ordered     Dose/Rate Route Frequency Start Stop    09/04/19 2005  piperacillin-tazobactam (ZOSYN) 3.375 g in iso-osmotic dextrose 50 ml (premix)     Ordering Provider:  Adela Gomez PA    3.375 g  over 4 Hours Intravenous Every 8 Hours 09/05/19 0000 09/09/19 0759    09/04/19 0522  vancomycin 1250 mg/250 mL 0.9% NS IVPB ([facility])     Ordering Provider:  Dung Valdez MD    20 mg/kg  × 60.5 kg Intravenous Once 09/04/19 0524 09/04/19 0950    09/04/19 0522  piperacillin-tazobactam (ZOSYN) 3.375 g in iso-osmotic dextrose 50 ml (premix)     Ordering Provider:  Dung Valdez MD    3.375 g Intravenous Once 09/04/19 0524 09/04/19 0552          Other Medications:  Current Facility-Administered Medications   Medication Dose Route Frequency Provider Last Rate Last Dose   • acetaminophen (TYLENOL) tablet 650 mg  650 mg Oral Q6H PRN Beverly Glasgow MD   650 mg at 09/04/19 1758   • carvedilol (COREG) tablet 12.5 mg  12.5 mg Oral BID With Meals Beverly Glasgow MD   12.5 mg at 09/05/19 0852   • diclofenac (VOLTAREN) 1 % gel 4 g  4 g Topical BID PRN Beverly Glasgow MD       • donepezil (ARICEPT) tablet 5 mg  5 mg Oral Nightly Bevelry Glasgow MD   5 mg at 09/04/19 2124   • ferrous sulfate tablet 325 mg  325 mg Oral Daily With Breakfast Beverly Glasgow MD   325 mg at 09/05/19 0852   • folic acid (FOLVITE) tablet 1 mg  1 mg Oral Daily Beverly Glasgow MD   1 mg at 09/05/19 0852   • ipratropium-albuterol (DUO-NEB) nebulizer solution 3 mL  3 mL Nebulization Q4H PRN Adela Gomez PA   3 mL at 09/05/19 0038   • ipratropium-albuterol (DUO-NEB) nebulizer solution 3 mL  3 mL Nebulization 4x Daily - RT Beverly Glasgow MD       • levothyroxine (SYNTHROID, LEVOTHROID) tablet 50 mcg  50 mcg Oral Q AM Beverly Glasgow MD   50 mcg at 09/05/19 0522   • lisinopril (PRINIVIL,ZESTRIL) tablet 2.5 mg  2.5 mg Oral Daily Beverly Glasgow MD   2.5 mg at 09/05/19 0852   • mirtazapine (REMERON) tablet 15 mg  15 mg Oral Daily Beverly Glasgow MD   15 mg at 09/05/19 0851   • Pharmacy Consult - MTM   Does not apply Daily Scott De La Rosa, [facility]   Stopped at 09/04/19 1514   • piperacillin-tazobactam (ZOSYN) 3.375 g in iso-osmotic dextrose 50 ml (premix)  3.375 g Intravenous Q8H Adela Gomez PA   3.375 g at 09/05/19 0900   • sertraline (ZOLOFT) tablet 50 mg  50 mg Oral Daily Beverly Glasgow MD   50 mg at 09/05/19 0852   •  "sodium chloride 0.9 % flush 10 mL  10 mL Intravenous PRN Dung Valdez MD   10 mL at 09/04/19 0917       Physical Exam:   Vital Signs   /48 (BP Location: Left arm, Patient Position: Lying)   Pulse 76   Temp 97.4 °F (36.3 °C) (Oral)   Resp 18   Ht 162.6 cm (64.02\")   Wt 60.5 kg (133 lb 6.1 oz)   LMP  (LMP Unknown)   SpO2 (!) 89%   BMI 22.88 kg/m²     GENERAL: Awake and alert, but not oriented. Elderly but in no acute distress.   HEENT: Normocephalic, atraumatic.  PERRL. EOMI. No conjunctival injection. No icterus.   NECK: Supple without nuchal rigidity. Mild JVD appreciated  HEART: RRR; No murmur.  LUNGS: Rales throughout the left lung fields, but improved  Diminished at right base.  Dry cough appreciated  ABDOMEN: Soft, nontender, nondistended. Positive bowel sounds. No rebound or guarding. No CVA tenderness  : no gramajo  EXT:  Chronic joint deformities on both hands, but no cyanosis, clubbing or significant edema.  MSK: FROM without joint effusions noted arms/legs.    SKIN: Warm and dry without cutaneous eruptions on Inspection/palpation.    NEURO: Oriented to name but not place or time. No focal deficits on motor/sensory exam at arms/legs.  PSYCHIATRIC: dementia, memory loss    Laboratory Data    Results from last 7 days   Lab Units 09/05/19  0424 09/04/19  0405 09/04/19  0357   WBC 10*3/mm3 5.21  --  6.12   HEMOGLOBIN g/dL 8.7*  --  7.8*   HEMOGLOBIN, POC g/dL  --  7.1*  --    HEMATOCRIT % 28.7*  --  27.3*   HEMATOCRIT POC %  --  21*  --    PLATELETS 10*3/mm3 172  --  181     Results from last 7 days   Lab Units 09/05/19  0425   SODIUM mmol/L 141   POTASSIUM mmol/L 4.6   CHLORIDE mmol/L 104   CO2 mmol/L 24.0   BUN mg/dL 40*   CREATININE mg/dL 1.28*   GLUCOSE mg/dL 70   CALCIUM mg/dL 7.0*     Estimated Creatinine Clearance: 24.6 mL/min (A) (by C-G formula based on SCr of 1.28 mg/dL (H)).  Results from last 7 days   Lab Units 09/04/19  0357   ALK PHOS U/L 84  84   BILIRUBIN mg/dL 0.3  0.3 "   BILIRUBIN DIRECT mg/dL <0.2*   ALT (SGPT) U/L <5  <5   AST (SGOT) U/L 16  16               Microbiology:    9/4 blood cx pending, NGTD   MRSA screen negative  sputum cx not collected    Radiology:  Xr Chest 1 View    Result Date: 9/5/2019  1. Mild improvement on comparison earlier film. Continued follow-up recommended. Signer Name: Rosario Sullivan MD  Signed: 9/5/2019 12:25 AM  Workstation Name: [facility]    Xr Chest 1 View    Result Date: 9/4/2019  Globular cardiomediastinal silhouette enlargement with severe left greater the right alveolar disease. Left-sided pacer defibrillator. Signer Name: Rosario Sullivan MD  Signed: 9/4/2019 5:15 AM  Workstation Name: [facility]    Ct Angiogram Chest With Contrast    Result Date: 9/4/2019  No evidence for pulmonary most. 2. Globular cardiac silhouette enlargement. 3. Bilateral pleural effusions. 4. Extensive alveolar disease asymmetrically worse on the left than the right. In addition to the airspace disease there are multiple bilateral nodular lesions in the lungs. This could be infectious or inflammatory in etiology but metastatic disease is also in the differential. 5. Partially calcified subcarinal lymph node. 6. No pneumothorax 7. Concern for left adrenal mass partly in the field-of-view. Signer Name: Rosario Sullivan MD  Signed: 9/4/2019 5:14 AM  Workstation Name: [facility]     I personally reviewed the above imaging studies.      Impression:   1. Acute on chronic hypoxic respiratory failure: unknown baseline, currently comfortable on 5 L.   2. Consolidation of LLL and DEJUAN on CT concerning for pneumonia:   3. Right pleural effusion  4. Acute on chronic systolic heart failure  5. Idiopathic cardiomyopathy (last EF 20%)  6. Dementia  7. Pacemaker, ICD  8. CKD stage 4  9. Hx of DVT  10. Hypothyroid    At this time is unclear to me how much worse than her baseline she  truly is. Comfortable on 4-5 L. Exam somewhat improved today after starting diuresis and antibiotics.  Mild dry cough.  Dr. Cardozo evaluated and recommends palliative care consult based on end-stage heart failure.  Due to extensive findings on imaging concerning for pneumonia we will continue empiric Zosyn for now, however she is nontoxic-appearing and has normal WBC, lactic acid, and procalcitonin suggesting against infection, although she is certainly high risk for healthcare associated pneumonia given her recent admissions and residence in a skilled nursing facility. Also high risk for aspiration. She likely has combination of both pneumonia and acute heart failure and will treat with empiric antibiotics as well as diuresis.    PLAN: Thank you for asking us to see Imelda Maldonado, I recommend the following:   - f/u pending blood cx, so far negative  - sputum culture if cough becomes productive  - trend CBC, BMP    - off vancomycin  - continue empiric zosyn, renally dosed, pending clinical improvement    - diuresis and other supportive care per primary team  - agree that palliative care consult is warranted    Discussed with Dr Glasgow. I will follow. Call with questions.     Didier Del Real MD  9/5/2019

## 2019-09-06 NOTE — PROGRESS NOTES
Infectious Disease Progress Note  Imelda Maldonado  4/29/1923  [id number]    Date of Consult: 9/4/2019  Admission Date: 9/4/2019    Requesting Provider: Beverly GUZMAN MD  Evaluating Physician: Didier Del Real MD    Chief Complaint: shortness of breath, cough    Reason for Consultation: possible pneumonia    History of present illness:   Patient is a 89 y.o.  Yr old female with history of idiopathic cardiomyopathy (EF 20%), osteoarthritis, CKD, DVT, gout, pacemaker/ICD, dementia. Recent admission from 8/19 to 8/21 with heart failure, tx with diuresis.    Presented back to [facility] ED on 9/4 from SNF due to concerns of of worsening shortness of breath and productive cough. Afebrile.  Unfortunately she is not oriented to place or time and is unable to provide any additional history. She is currently on 3 L O2. BP stable. WBC 6. Blood cx pending. CTA concerning for left sided pnuemonia, Started on vancomycin and zosyn. BNP is 7535. Lactate 1.0. ID consulted for assistance with antibiotics. Currently resting comfortably. RN has not appreciated any cough. Patient complains of chronic chest pain.     9/5: Afebrile overnight.  Overall clinically stable.  She is on 5 L of oxygen.  Net negative about 800 mL's.  Tolerating Zosyn.  She complains of mild cough but no chest pain.  She is hungry and would like to eat breakfast    9/6: palliative consulted, planning to transition to hospice care at SNF. Stable clinically, fatigue, afebrile. Mild wet sounding cough on 4 L O2    Review of Systems  She is not oriented to place or time.  No chest pain. Otherwise ROS limited based on memory loss    No Known Allergies    Antibiotics:  Anti-Infectives (From admission, onward)    Ordered     Dose/Rate Route Frequency Start Stop    09/04/19 2005  piperacillin-tazobactam (ZOSYN) 3.375 g in iso-osmotic dextrose 50 ml (premix)     Ordering Provider:  Adela Gomez PA    3.375 g  over 4 Hours Intravenous Every 8 Hours 09/05/19 0000 09/09/19  0759    09/04/19 0522  vancomycin 1250 mg/250 mL 0.9% NS IVPB ([facility])     Ordering Provider:  Dung Valdez MD    20 mg/kg × 60.5 kg Intravenous Once 09/04/19 0524 09/04/19 0950    09/04/19 0522  piperacillin-tazobactam (ZOSYN) 3.375 g in iso-osmotic dextrose 50 ml (premix)     Ordering Provider:  Dung Valdez MD    3.375 g Intravenous Once 09/04/19 0524 09/04/19 0552          Other Medications:  Current Facility-Administered Medications   Medication Dose Route Frequency Provider Last Rate Last Dose   • acetaminophen (TYLENOL) tablet 650 mg  650 mg Oral Q6H PRN Beverly Glasgow MD   650 mg at 09/06/19 0120   • carvedilol (COREG) tablet 12.5 mg  12.5 mg Oral BID With Meals Beverly Glasgow MD   12.5 mg at 09/06/19 0909   • diclofenac (VOLTAREN) 1 % gel 4 g  4 g Topical BID PRN Beverly Glasgow MD       • donepezil (ARICEPT) tablet 5 mg  5 mg Oral Nightly Beverly Glasgow MD   5 mg at 09/05/19 2004   • ferrous sulfate tablet 325 mg  325 mg Oral Daily With Breakfast Beverly Glasgow MD   325 mg at 09/06/19 0909   • folic acid (FOLVITE) tablet 1 mg  1 mg Oral Daily Beverly Glasgow MD   1 mg at 09/06/19 0910   • furosemide (LASIX) injection 20 mg  20 mg Intravenous Q12H Beverly Glasgow MD   20 mg at 09/06/19 0505   • ipratropium-albuterol (DUO-NEB) nebulizer solution 3 mL  3 mL Nebulization Q4H PRN Adela Gomez PA   3 mL at 09/05/19 0038   • ipratropium-albuterol (DUO-NEB) nebulizer solution 3 mL  3 mL Nebulization 4x Daily - RT Beverly Glasgow MD   3 mL at 09/06/19 0924   • levothyroxine (SYNTHROID, LEVOTHROID) tablet 50 mcg  50 mcg Oral Q AM Beverly Glasgow MD   50 mcg at 09/06/19 0506   • lisinopril (PRINIVIL,ZESTRIL) tablet 2.5 mg  2.5 mg Oral Daily Beverly Glasgow MD   2.5 mg at 09/06/19 0910   • mirtazapine (REMERON) tablet 15 mg  15 mg Oral Daily Beverly Glasgow MD   15 mg at 09/06/19 0910   • Pharmacy Consult - MTM   Does not apply Daily Scott De La Rosa, [facility]   Stopped at 09/04/19 0967  "  • piperacillin-tazobactam (ZOSYN) 3.375 g in iso-osmotic dextrose 50 ml (premix)  3.375 g Intravenous Q8H Adela Gomez PA   3.375 g at 09/06/19 0909   • sertraline (ZOLOFT) tablet 50 mg  50 mg Oral Daily Beverly Glasgow MD   50 mg at 09/06/19 0910   • sodium chloride 0.9 % flush 10 mL  10 mL Intravenous PRN Dung Valdez MD   10 mL at 09/04/19 0951       Physical Exam:   Vital Signs   /46   Pulse 80   Temp 97.5 °F (36.4 °C) (Oral)   Resp 18   Ht 162.6 cm (64.02\")   Wt 60.5 kg (133 lb 6.1 oz)   LMP  (LMP Unknown)   SpO2 95%   BMI 22.88 kg/m²     GENERAL: fatigue but easily arousible, but not oriented.   no acute distress.   HEENT: Normocephalic, atraumatic.  PERRL. EOMI. No conjunctival injection. No icterus.   HEART: RRR; No murmur.  LUNGS: Lungs sounds diminished at right base. No rales. Wet sounding cough appreciated.    ABDOMEN: Soft, nontender, nondistended. Positive bowel sounds. No rebound or guarding.    : no gramajo  EXT:  Chronic joint deformities on both hands, but no cyanosis, clubbing or significant edema.  MSK: FROM without joint effusions noted arms/legs.    SKIN: Warm and dry without cutaneous eruptions on Inspection/palpation.    NEURO: Oriented to name but not place or time. No focal deficits on motor/sensory exam at arms/legs.  PSYCHIATRIC: dementia, memory loss    Laboratory Data    Results from last 7 days   Lab Units 09/06/19  0712 09/05/19  0424 09/04/19  0405 09/04/19  0357   WBC 10*3/mm3 4.82 5.21  --  6.12   HEMOGLOBIN g/dL 7.7* 8.7*  --  7.8*   HEMOGLOBIN, POC g/dL  --   --  7.1*  --    HEMATOCRIT % 26.0* 28.7*  --  27.3*   HEMATOCRIT POC %  --   --  21*  --    PLATELETS 10*3/mm3 154 172  --  181     Results from last 7 days   Lab Units 09/06/19  0712   SODIUM mmol/L 140   POTASSIUM mmol/L 4.0   CHLORIDE mmol/L 101   CO2 mmol/L 27.0   BUN mg/dL 41*   CREATININE mg/dL 1.39*   GLUCOSE mg/dL 87   CALCIUM mg/dL 6.9*     Estimated Creatinine Clearance: 22.6 mL/min (A) " (by C-G formula based on SCr of 1.39 mg/dL (H)).  Results from last 7 days   Lab Units 09/04/19  0357   ALK PHOS U/L 84  84   BILIRUBIN mg/dL 0.3  0.3   BILIRUBIN DIRECT mg/dL <0.2*   ALT (SGPT) U/L <5  <5   AST (SGOT) U/L 16  16               Microbiology:    9/4 blood cx pending, NGTD   MRSA screen negative  sputum cx not collected    Radiology:  Xr Chest 1 View    Result Date: 9/5/2019  1. Mild improvement on comparison earlier film. Continued follow-up recommended. Signer Name: Rosario Sullivan MD  Signed: 9/5/2019 12:25 AM  Workstation Name: [facility]  Radiology [facility]    Xr Chest 1 View    Result Date: 9/4/2019  Globular cardiomediastinal silhouette enlargement with severe left greater the right alveolar disease. Left-sided pacer defibrillator. Signer Name: Rosario Sullivan MD  Signed: 9/4/2019 5:15 AM  Workstation Name: [facility]  Radiology [facility]    Ct Angiogram Chest With Contrast    Result Date: 9/4/2019  No evidence for pulmonary most. 2. Globular cardiac silhouette enlargement. 3. Bilateral pleural effusions. 4. Extensive alveolar disease asymmetrically worse on the left than the right. In addition to the airspace disease there are multiple bilateral nodular lesions in the lungs. This could be infectious or inflammatory in etiology but metastatic disease is also in the differential. 5. Partially calcified subcarinal lymph node. 6. No pneumothorax 7. Concern for left adrenal mass partly in the field-of-view. Signer Name: Rosario Sullivan MD  Signed: 9/4/2019 5:14 AM  Workstation Name: [id number]  Radiology [facility]     I personally reviewed the above imaging studies.      Impression:   1. Acute on chronic hypoxic respiratory failure: likely close to baseline, currently comfortable on 4 L.   2. Consolidation of LLL and DEJUAN on CT concerning for pneumonia:   3. Right pleural effusion  4. Acute on chronic systolic heart failure  5. Idiopathic cardiomyopathy (last  EF 20%)  6. Dementia  7. Pacemaker, ICD  8. CKD stage 4  9. Hx of DVT  10. Hypothyroid    Palliative care consulted: planning to transition to hospice care at SNF which is appropriate. Likely symptoms mostly due to CHF with possible HCAP. Continue antibiotics until discharge, then stop.     PLAN:    - f/u pending blood cx, so far negative    - continue empiric zosyn, renally dosed, until discharge to hospice care, then stop    - diuresis and other supportive care per primary team    Call if any other acute concerns prior to discharge    Didier Del Real MD  9/6/2019

## 2019-09-06 NOTE — PROGRESS NOTES
Continued Stay Note  [facility]     Patient Name: Imelda Maldonado  MRN: [id number]  Today's Date: 9/6/2019    Admit Date: 9/4/2019    Discharge Plan     Row Name 09/06/19 0921       Plan    Plan  [facility]     Patient/Family in Agreement with Plan  yes    Plan Comments  Spoke with [facility] liaison, patient already has a long term care medicaid bed there so if she were to return with Hospice care, it would not be a problem as far as insurance is concerned. - Opal [phone]     Row Name 09/06/19 0913       Plan    Plan  [facility]     Patient/Family in Agreement with Plan  yes    Plan Comments  Spoke with patient's daughter over the phone, she does want patient to return to [facility] as she has been there approx 4 years already. She is hoping for Hospice to be able to follow her there. Left message with [facility] Liaison to ask about patient returning with Hospice - will be following to assist as needed - opal [phone]        Discharge Codes    No documentation.             Opal Faith RN

## 2019-09-06 NOTE — THERAPY TREATMENT NOTE
Acute Care - Speech Language Pathology   Swallow Treatment Note [facility]     Patient Name: Imelda Maldonado  : 1923  MRN: [id number]  Today's Date: 2019               Admit Date: 2019    Visit Dx:      ICD-10-CM ICD-9-CM   1. HCAP (healthcare-associated pneumonia) J18.9 486   2. Shortness of breath R06.02 786.05   3. Elevated troponin R74.8 790.6   4. Anemia, unspecified type D64.9 285.9   5. Dysphagia, unspecified type R13.10 787.20     Patient Active Problem List   Diagnosis   • Idiopathic cardiomyopathy (CMS/HCC)   • Arthritis   • Hyperlipidemia   • Gout   • DVT (deep venous thrombosis) (CMS/[facility])   • CRI (chronic renal insufficiency)   • Essential hypertension   • Hypotension   • Acute and chronic respiratory failure with hypoxia (CMS/[facility])   • CKD (chronic kidney disease) stage 3, GFR 30-59 ml/min (CMS/[facility])   • Pacemaker   • Acute on chronic congestive heart failure (CMS/[facility])   • Hypothyroidism (acquired)   • Dementia   • Elevated troponin   • HCAP (healthcare-associated pneumonia)   • Acute on chronic anemia       Therapy Treatment  Rehabilitation Treatment Summary     Row Name 19 1115             Treatment Time/Intention    Discipline  speech language pathologist  -CH      Document Type  therapy note (daily note)  -CH      Subjective Information  no complaints  -CH      Mode of Treatment  individual therapy;speech-language pathology  -CH      Patient/Family Observations  No family present, patient u/r in chair.  -CH      Therapy Frequency (Swallow)  PRN  -CH      Existing Precautions/Restrictions  oxygen therapy device and L/min  -CH      Recorded by [CH] Pricila Ac, MS CCC-SLP 19 1143      Row Name                Wound 19 0628 Left anterior toe Pressure Injury    Wound - Properties Group Date first assessed: 19 [HH] Time first assessed: 628 [HH] Side: Left [HH] Orientation: anterior [HH] Location: toe [HH] Primary Wound Type: Pressure inj [HH] Stage, Pressure  Injury: deep tissue injury [] Recorded by:  [] Bessie Shankar RN 09/04/19 0629    Row Name 09/06/19 1115             Outcome Summary/Treatment Plan (SLP)    Daily Summary of Progress (SLP)  progress toward functional goals is good  -      Plan for Continued Treatment (SLP)  Patient tolerating current soft diet and thin liquids without s/sx of aspiration or difficulty. Reviewed general swallow precautions with patient.Patient able to return demonstrate. Will f/u x 1 for family education.  -      Anticipated Dischage Disposition  unknown;other (see comments) possible hospice  -      Recorded by [] Pricila Ac, MS CCC-SLP 09/06/19 1143        User Key  (r) = Recorded By, (t) = Taken By, (c) = Cosigned By    Initials Name Effective Dates Discipline     Bessie Shankar RN 02/15/19 -  Nurse    CH Pricila Ac, MS CCC-SLP 02/14/19 -  SLP          Outcome Summary  Outcome Summary/Treatment Plan (SLP)  Daily Summary of Progress (SLP): progress toward functional goals is good (09/06/19 1115 : Pricila Ac, MS CCC-SLP)  Plan for Continued Treatment (SLP): Patient tolerating current soft diet and thin liquids without s/sx of aspiration or difficulty. Reviewed general swallow precautions with patient.Patient able to return demonstrate. Will f/u x 1 for family education. (09/06/19 1115 : Pricila Ac, MS CCC-SLP)  Anticipated Dischage Disposition: unknown, other (see comments)(possible hospice) (09/06/19 1115 : Pricila Ac, MS CCC-SLP)      SLP GOALS     Row Name 09/06/19 1115 09/05/19 0820          Oral Nutrition/Hydration Goal 1 (SLP)    Oral Nutrition/Hydration Goal 1, SLP  LTG: Pt will tolerate recommended diet w/o overt s/sxs aspiration or discomfort w/ 100% accuracy.  -CH  LTG: Pt will tolerate recommended diet w/o overt s/sxs aspiration or discomfort w/ 100% accuracy.  -VO     Time Frame (Oral Nutrition/Hydration Goal 1, SLP)  by discharge  -CH  by discharge  -VO     Barriers  (Oral Nutrition/Hydration Goal 1, SLP)  Patient tolerated soft solid trials and thin liquids via sm single straw sips without s/sx of aspiration or difficulty.   -CH  --     Progress/Outcomes (Oral Nutrition/Hydration Goal 1, SLP)  good progress toward goal  -CH  goal ongoing  -VO        Oral Nutrition/Hydration Goal 2 (SLP)    Oral Nutrition/Hydration Goal 2, SLP  Pt will tolerate soft whole foods and thins (small single sips) w/o overt s/s aspiration or discomfort w/ 100% accuracy.  -CH  Pt will tolerate soft whole foods and thins (small single sips) w/o overt s/s aspiration or discomfort w/ 100% accuracy.  -VO     Time Frame (Oral Nutrition/Hydration Goal 2, SLP)  short term goal (STG)  -CH  short term goal (STG)  -VO     Progress/Outcomes (Oral Nutrition/Hydration Goal 2, SLP)  goal ongoing  -CH  goal ongoing  -VO        Swallow Compensatory Strategies Goal 1 (SLP)    Activity (Swallow Compensatory Strategies/Techniques Goal 1, SLP)  compensatory strategies;during meal intake;small bites;small cup sips;small straw sips;other (see comments)  -CH  compensatory strategies;during meal intake;small bites;small cup sips;small straw sips;other (see comments) basic aspiration precautions  -VO     [place]/Accuracy (Swallow Compensatory Strategies/Techniques Goal 1, SLP)  with minimal cues (75-90% accuracy)  -CH  with minimal cues (75-90% accuracy)  -VO     Time Frame (Swallow Compensatory Strategies/Techniques Goal 1, SLP)  short term goal (STG)  -CH  short term goal (STG)  -VO     Barriers (Swallow Compensatory Strategies/Techniques Goal 1, SLP)  Patient able to return demonstrate strategies after ST reviewed. F/U x1 for family education  -CH  --     Progress/Outcomes (Swallow Compensatory Strategies/Techniques Goal 1, SLP)  continuing progress toward goal  -CH  goal ongoing  -VO       User Key  (r) = Recorded By, (t) = Taken By, (c) = Cosigned By    Initials Name Provider Type    VO Shellie Valdez MA,CCC-SLP  Speech and Language Pathologist    Pricila Manuel MS CCC-SLP Speech and Language Pathologist          EDUCATION  The patient has been educated in the following areas:   Dysphagia (Swallowing Impairment) Oral Care/Hydration Modified Diet Instruction.    SLP Recommendation and Plan  Daily Summary of Progress (SLP): progress toward functional goals is good     Plan for Continued Treatment (SLP): Patient tolerating current soft diet and thin liquids without s/sx of aspiration or difficulty. Reviewed general swallow precautions with patient.Patient able to return demonstrate. Will f/u x 1 for family education.  Anticipated Dischage Disposition: unknown, other (see comments)(possible hospice)                    Time Calculation:   Time Calculation- SLP     Row Name 09/06/19 1145             Time Calculation- SLP    SLP Start Time  1115  -      SLP Received On  09/06/19  -        User Key  (r) = Recorded By, (t) = Taken By, (c) = Cosigned By    Initials Name Provider Type    Pricila Manuel MS CCC-SLP Speech and Language Pathologist          Therapy Charges for Today     Code Description Service Date Service Provider Modifiers Qty    [id number] HC ST TREATMENT SWALLOW 3 9/6/2019 Pricila Ac MS CCC-SLP GN 1                 Pricila Ac MS CCC-SLP  9/6/2019

## 2019-09-06 NOTE — PROGRESS NOTES
Continued Stay Note  [facility]     Patient Name: Imelda Maldonado  MRN: [id number]  Today's Date: 9/6/2019    Admit Date: 9/4/2019    Discharge Plan     Row Name 09/06/19 0913       Plan    Plan  [facility]     Patient/Family in Agreement with Plan  yes    Plan Comments  Spoke with patient's daughter over the phone, she does want patient to return to [facility] as she has been there approx 4 years already. She is hoping for Hospice to be able to follow her there. Left message with [facility] Liaison to ask about patient returning with Hospice - will be following to assist as needed - opal [phone]        Discharge Codes    No documentation.             Opal Faith RN

## 2019-09-06 NOTE — PROGRESS NOTES
Continued Stay Note  [facility]     Patient Name: Imelda Maldonado  MRN: [id number]  Today's Date: 9/6/2019    Admit Date: 9/4/2019    Discharge Plan     Row Name 09/06/19 1648       Plan    Plan Comments  CM received call from Primary RN Emilia that pt was supposed to be discharged today to [facility] via [facility] ambulance at 1700 and Dr. Glasgow was unaware pt was ready to return and has already left for the day.  Per Pee Nieto Dr has requested discharge to be changed to Saturday. CM contaacted Imelda with Hospice ([phone]) and notified of discharge issues and they will reschedule ambulance for Saturday. CM contacted [facility] with [facility] and she was also unaware of pt's return and will notify facility. CM will need to fax discharge summary Saturday to [phone].  JANY Nieto to notify pt's daughter.     Row Name 09/06/19 2819       Plan    Plan  Return to [facility] today, per ambulance at 1700    Final Discharge Disposition Code  03 - skilled nursing facility (SNF)    Final Note  Hospice consult, chart reviewed, call made to daughter; who does not want further aggressive treatment, and wants patient to have hospice in facility. Patient is permanent resident at [facility]. Visit made to see patient, who is pleasantlly confused. Dr Glasgow ciontacted        Discharge Codes    No documentation.             Samreen Hinojosa, RN

## 2019-09-06 NOTE — PROGRESS NOTES
Continued Stay Note  [facility]     Patient Name: Imelda Maldonado  MRN: [id number]  Today's Date: 9/6/2019    Admit Date: 9/4/2019    Discharge Plan     Row Name 09/06/19 1713       Plan    Plan  Skilled nursing facility with hospice    Plan Comments  Telephone call from FARIHA Hinojosa CM stating Dr. Glasgow called, stating had spoken with Dr. Cavazos regarding pt discharging, though was unaware the discharge would be today. Discharge changed to tomorrow. Telephone call to [facility] , ambulance  changed to tomorrow at 1000. Staff nurse Emilia notified of ambulance  for tomorrow at 1000. Telephone call to pt's daughter Earlene, informed pt will be discharged tomorrow, with ambulance  at 1000. Telephone call to [facility] staff nurse and FARIHA Rosado CM, notified of ambulance  tomorrow at 1000.     Row Name 09/06/19 1702       Plan    Plan  --    Plan Comments  --    Row Name 09/06/19 1645       Plan

## 2019-09-06 NOTE — PLAN OF CARE
Problem: Fall Risk (Adult)  Goal: Absence of Fall  Outcome: Ongoing (interventions implemented as appropriate)   09/06/19 0311   Fall Risk (Adult)   Absence of Fall making progress toward outcome       Problem: Patient Care Overview  Goal: Plan of Care Review  Outcome: Ongoing (interventions implemented as appropriate)      Problem: Breathing Pattern Ineffective (Adult)  Goal: Effective Oxygenation/Ventilation  Outcome: Ongoing (interventions implemented as appropriate)   09/06/19 0311   Breathing Pattern Ineffective (Adult)   Effective Oxygenation/Ventilation making progress toward outcome     Goal: Anxiety/Fear Reduction  Outcome: Ongoing (interventions implemented as appropriate)   09/06/19 0311   Breathing Pattern Ineffective (Adult)   Anxiety/Fear Reduction making progress toward outcome       Problem: Confusion, Chronic (Adult)  Goal: Free from Harm/Injuries  Outcome: Ongoing (interventions implemented as appropriate)   09/06/19 0311   Confusion, Chronic (Adult)   Free from Harm/Injuries making progress toward outcome       Problem: Patient Care Overview  Goal: Plan of Care Review  Outcome: Ongoing (interventions implemented as appropriate)   09/06/19 0311   Coping/Psychosocial   Plan of Care Reviewed With patient   Plan of Care Review   Progress no change   OTHER   Outcome Summary Pt remained confused throughout shift. Vital signs stable and appeared to rest well. PRN tylenol administered for pain.Will continue to monitor.        Problem: Skin Injury Risk (Adult)  Goal: Skin Health and Integrity  Outcome: Ongoing (interventions implemented as appropriate)   09/06/19 0311   Skin Injury Risk (Adult)   Skin Health and Integrity making progress toward outcome       Problem: Palliative Care (Adult)  Goal: Maximized Comfort  Outcome: Ongoing (interventions implemented as appropriate)   09/06/19 0311   Palliative Care (Adult)   Maximized Comfort making progress toward outcome     Goal: Enhanced Quality of  Life  Outcome: Ongoing (interventions implemented as appropriate)

## 2019-09-06 NOTE — PLAN OF CARE
Problem: Patient Care Overview  Goal: Plan of Care Review   09/06/19 9331   Coping/Psychosocial   Plan of Care Reviewed With patient   OTHER   Outcome Summary PT eval completed, patient required max assist for bed mobility and sit pivot transfer to bedside commode, sit to partial stand with assistx2, limited by weakness, deferred gait not appriopriate at this time, recommend return to LTC, pending PLOF and progress

## 2019-09-06 NOTE — THERAPY EVALUATION
Patient Name: Imelda Maldonado  : 1923    MRN: [id number]                              Today's Date: 2019       Admit Date: 2019    Visit Dx:     ICD-10-CM ICD-9-CM   1. HCAP (healthcare-associated pneumonia) J18.9 486   2. Shortness of breath R06.02 786.05   3. Elevated troponin R74.8 790.6   4. Anemia, unspecified type D64.9 285.9     Patient Active Problem List   Diagnosis   • Idiopathic cardiomyopathy (CMS/HCC)   • Arthritis   • Hyperlipidemia   • Gout   • DVT (deep venous thrombosis) (CMS/HCC)   • CRI (chronic renal insufficiency)   • Essential hypertension   • Hypotension   • Acute and chronic respiratory failure with hypoxia (CMS/HCC)   • CKD (chronic kidney disease) stage 3, GFR 30-59 ml/min (CMS/HCC)   • Pacemaker   • Acute on chronic congestive heart failure (CMS/HCC)   • Hypothyroidism (acquired)   • Dementia   • Elevated troponin   • HCAP (healthcare-associated pneumonia)   • Acute on chronic anemia     Past Medical History:   Diagnosis Date   • Acute GI bleeding     2013 - 2013. Status post 7 units of packed red blood cells. Esophagogastroduodenoscopy (no active bleed) and colonoscopy (active bleeding from flat cecal polyps and two ascending colonic polyps) suggestive of non-steroidal anti-inflammatory drug colopathy or mild ischemia. Positive for Clostridium difficile status post 14 days of antibiotics.     • Arthritis     reinitiation of celebrex, 2013   • CRI (chronic renal insufficiency)     mild   • DVT (deep venous thrombosis) (CMS/HCC)     RLE   • Gout    • Histoplasmosis    • Hyperlipidemia    • Hypertension    • Hypotension     symptoms of dizziness   • Idiopathic cardiomyopathy (CMS/HCC)      Past Surgical History:   Procedure Laterality Date   • APPENDECTOMY     • HYSTERECTOMY     • PACEMAKER IMPLANTATION      change out on 10/30/15 without complication   • POLYPECTOMY     • THYROIDECTOMY      chronic replacement therapy     General Information     Row Name  09/06/19 0905          PT Evaluation Time/Intention    Document Type  evaluation  -KG     Mode of Treatment  physical therapy  -KG     Row Name 09/06/19 0905          General Information    Patient Profile Reviewed?  yes  -KG     Prior Level of Function  -- Patient questionable historian, unsure of PLOF  -KG     Existing Precautions/Restrictions  oxygen therapy device and L/min;fall  -KG     Barriers to Rehab  cognitive status;previous functional deficit;contractures;physical barrier  -KG     Row Name 09/06/19 0905          Relationship/Environment    Lives With  facility resident  -KG     Row Name 09/06/19 0905          Resource/Environmental Concerns    Current Living Arrangements  extended care facility  -KG     Row Name 09/06/19 0905          Cognitive Assessment/Intervention- PT/OT    Orientation Status (Cognition)  oriented to;person;disoriented to;place;situation;time  -KG     Row Name 09/06/19 0905          Safety Issues, Functional Mobility    Safety Issues Affecting Function (Mobility)  ability to follow commands;problem solving;sequencing abilities;friction/shear risk;insight into deficits/self awareness  -KG     Impairments Affecting Function (Mobility)  balance;cognition;strength;endurance/activity tolerance  -KG       User Key  (r) = Recorded By, (t) = Taken By, (c) = Cosigned By    Initials Name Provider Type    KG Cary Wolf Physical Therapist        Mobility     Row Name 09/06/19 0908          Bed Mobility Assessment/Treatment    Bed Mobility Assessment/Treatment  supine-sit  -KG     Supine-Sit [place] (Bed Mobility)  maximum assist (25% patient effort);verbal cues  -KG     Assistive Device (Bed Mobility)  bed rails;draw sheet;head of bed elevated  -KG     Comment (Bed Mobility)  cues for hand placement and sequencing  -KG     Row Name 09/06/19 0908          Transfer Assessment/Treatment    Comment (Transfers)  Attempted stand pivot with RW, unsuccessful d/t weakness. Completed sit pivot  with MAXA to BSC. Sit to partial standing with Ax2 for julita hygiene. Unable to achieve full upright standing d/t BLE weakness/contractures.  -KG     Row Name 09/06/19 0908          Bed-Chair Transfer    Bed-Chair [place] (Transfers)  maximum assist (25% patient effort);1 person assist  -KG     Row Name 09/06/19 0908          Sit-Stand Transfer    Sit-Stand [place] (Transfers)  dependent (less than 25% patient effort);2 person assist  -KG     Assistive Device (Sit-Stand Transfers)  other (see comments) BUE support + gait belt  -KG     Row Name 09/06/19 0908          Gait/Stairs Assessment/Training    Comment (Gait/Stairs)  Not appropriate.  -KG       User Key  (r) = Recorded By, (t) = Taken By, (c) = Cosigned By    Initials Name Provider Type    Cary Currie Physical Therapist        Obj/Interventions     Row Name 09/06/19 0912          General ROM    GENERAL ROM COMMENTS  visual inspection limiting 15% full knee extension  -KG     Row Name 09/06/19 0912          MMT (Manual Muscle Testing)    General MMT Comments  Bilateral LE 3+/5  -KG     Row Name 09/06/19 0912          Sensory Assessment/Intervention    Sensory General Assessment  no sensation deficits identified  -KG       User Key  (r) = Recorded By, (t) = Taken By, (c) = Cosigned By    Initials Name Provider Type    Cary Currie Physical Therapist        Goals/Plan     Row Name 09/06/19 0917          Bed Mobility Goal 1 (PT)    Activity/Assistive Device (Bed Mobility Goal 1, PT)  sit to supine/supine to sit  -KG     [place] Level/Cues Needed (Bed Mobility Goal 1, PT)  minimum assist (75% or more patient effort)  -KG     Time Frame (Bed Mobility Goal 1, PT)  long term goal (LTG);2 weeks  -KG     Row Name 09/06/19 0917          Transfer Goal 1 (PT)    Activity/Assistive Device (Transfer Goal 1, PT)  bed-to-chair/chair-to-bed  -KG     [place] Level/Cues Needed (Transfer Goal 1, PT)  minimum assist (75% or more patient effort)   -KG     Time Frame (Transfer Goal 1, PT)  long term goal (LTG);2 weeks  -KG     Row Name 09/06/19 0917          Gait Training Goal 1 (PT)    Activity/Assistive Device (Gait Training Goal 1, PT)  gait (walking locomotion);walker, rolling  -KG     [place] Level (Gait Training Goal 1, PT)  minimum assist (75% or more patient effort)  -KG     Distance (Gait Goal 1, PT)  50 feet  -KG     Time Frame (Gait Training Goal 1, PT)  long term goal (LTG);2 weeks  -KG     Row Name 09/06/19 0917          Patient Education Goal (PT)    Activity (Patient Education Goal, PT)  HEP  -KG     [place]/Cues/Accuracy (Memory Goal 2, PT)  demonstrates adequately  -KG     Time Frame (Patient Education Goal, PT)  long term goal (LTG);2 weeks  -KG       User Key  (r) = Recorded By, (t) = Taken By, (c) = Cosigned By    Initials Name Provider Type    LETTY Cary Wolf Physical Therapist        Clinical Impression     Row Name 09/06/19 0914          Pain Assessment    Additional Documentation  Pain Scale: Numbers Pre/Post-Treatment (Group)  -KG     Row Name 09/06/19 0914          Pain Scale: Numbers Pre/Post-Treatment    Pain Scale: Numbers, Pretreatment  0/10 - no pain  -KG     Pain Scale: Numbers, Post-Treatment  0/10 - no pain  -KG     Row Name 09/06/19 0914          Plan of Care Review    Plan of Care Reviewed With  patient  -KG     Row Name 09/06/19 0914          Physical Therapy Clinical Impression    Criteria for Skilled Interventions Met (PT Clinical Impression)  yes;treatment indicated  -KG     Rehab Potential (PT Clinical Summary)  fair, will monitor progress closely  -KG     Row Name 09/06/19 0914          Vital Signs    Pre Systolic BP Rehab  136  -KG     Pre Treatment Diastolic BP  56  -KG     Post Systolic BP Rehab  113  -KG     Post Treatment Diastolic BP  47  -KG     Pretreatment Heart Rate (beats/min)  75  -KG     Posttreatment Heart Rate (beats/min)  75  -KG     Pre SpO2 (%)  96  -KG     O2 Delivery Pre Treatment   supplemental O2  -KG     Post SpO2 (%)  93  -KG     O2 Delivery Post Treatment  supplemental O2  -KG     Pre Patient Position  Supine  -KG     Post Patient Position  Sitting  -KG     Row Name 09/06/19 0914          Positioning and Restraints    Pre-Treatment Position  in bed  -KG     Post Treatment Position  chair  -KG     In Chair  reclined;call light within reach;encouraged to call for assist;exit alarm on;waffle cushion;on mechanical lift sling;waffle boot/both;legs elevated  -KG       User Key  (r) = Recorded By, (t) = Taken By, (c) = Cosigned By    Initials Name Provider Type    Cary uCrrie Physical Therapist        Outcome Measures     Row Name 09/06/19 0923          How much help from another person do you currently need...    Turning from your back to your side while in flat bed without using bedrails?  2  -KG     Moving from lying on back to sitting on the side of a flat bed without bedrails?  2  -KG     Moving to and from a bed to a chair (including a wheelchair)?  2  -KG     Standing up from a chair using your arms (e.g., wheelchair, bedside chair)?  1  -KG     Climbing 3-5 steps with a railing?  1  -KG     To walk in hospital room?  1  -KG     AM-PAC 6 Clicks Score (PT)  9  -KG     Row Name 09/06/19 0923          Functional Assessment    Outcome Measure Options  AM-PAC 6 Clicks Basic Mobility (PT)  -KG       User Key  (r) = Recorded By, (t) = Taken By, (c) = Cosigned By    Initials Name Provider Type    Cary Currie Physical Therapist        Physical Therapy Education     Title: PT OT SLP Therapies (In Progress)     Topic: Physical Therapy (In Progress)     Point: Mobility training (In Progress)     Learning Progress Summary           Patient Acceptance, E, NR by KG at 9/6/2019  9:20 AM                   Point: Body mechanics (In Progress)     Learning Progress Summary           Patient Acceptance, E, NR by KG at 9/6/2019  9:20 AM                   Point: Precautions (In Progress)      Learning Progress Summary           Patient Acceptance, E, NR by KG at 9/6/2019  9:20 AM                               User Key     Initials Effective Dates Name Provider Type Discipline    KG 08/28/19 -  Cary Wolf Physical Therapist PT              PT Recommendation and Plan  Planned Therapy Interventions (PT Eval): balance training, bed mobility training, gait training, home exercise program, patient/family education, strengthening, transfer training, wheelchair management/propulsion training  Outcome Summary/Treatment Plan (PT)  Anticipated Discharge Disposition (PT): extended care facility  Plan of Care Reviewed With: patient  Outcome Summary: PT eval completed, patient required max assist for bed mobility and sit pivot transfer to bedside commode, sit to partial stand with assistx2, limited by weakness, deferred gait not appriopriate at this time, recommend return to LTC, pending PLOF and progress     Time Calculation:   PT Charges     Row Name 09/06/19 0805             Time Calculation    Start Time  0805  -KG      PT Received On  09/06/19  -KG      PT Goal Re-Cert Due Date  09/16/19  -KG         Time Calculation- PT    Total Timed Code Minutes- PT  15 minute(s)  -KG         Timed Charges    31509 - PT Therapeutic Activity Minutes  15  -KG        User Key  (r) = Recorded By, (t) = Taken By, (c) = Cosigned By    Initials Name Provider Type    KG Cary Wolf Physical Therapist        Therapy Charges for Today     Code Description Service Date Service Provider Modifiers Qty    91750309351 HC PT THERAPEUTIC ACT EA 15 MIN 9/6/2019 Cary Wolf GP 1    99141170046 HC PT EVAL MOD COMPLEXITY 4 9/6/2019 Cary Wolf GP 1          PT G-Codes  Outcome Measure Options: AM-PAC 6 Clicks Basic Mobility (PT)  AM-PAC 6 Clicks Score (PT): 9    Cary Wolf  9/6/2019

## 2019-09-06 NOTE — PROGRESS NOTES
[facility] Medicine Services  PROGRESS NOTE    Patient Name: Imelda Maldonado  : 1923  MRN: [id number]    Date of Admission: 2019  Length of Stay: 1  Primary Care Physician: Leyla Castro DO    Subjective   Subjective     CC:  F/U SOA, hypoxia    HPI:  Patient seen this morning. Eating breakfast. No complaints. Down to 4 liters oxygen.    Review of Systems  CV-no chest pain, no palpitations  Resp-no cough, no dyspnea  GI-no N/V/D, no abd pain    All other systems reviewed and negative except any additional pertinent positives and negatives as discussed in HPI.    Objective   Objective     Vital Signs:   Temp:  [97 °F (36.1 °C)-97.9 °F (36.6 °C)] 97.5 °F (36.4 °C)  Heart Rate:  [74-80] 80  Resp:  [18] 18  BP: ()/(41-60) 112/46        Physical Exam:  Gen-no acute distress, frail appearance  HENT-NCAT, mucous membranes moist  CV-RRR, S1 S2 normal, no m/r/g  Resp-improved breath sounds bilaterally, no wheezes, nonlabored  Abd-soft, NT, ND, +BS  Ext-no edema  Neuro-A&Ox1, answers simple questions, no focal deficits  Skin-no rashes  Psych-appropriate mood    Results Reviewed:    Results from last 7 days   Lab Units 19  0712 19  0424 19  1248 19  0405 19  0357 19  0356   WBC 10*3/mm3 4.82 5.21  --   --  6.12  --    HEMOGLOBIN g/dL 7.7* 8.7*  --   --  7.8*  --    HEMOGLOBIN, POC g/dL  --   --   --  7.1*  --   --    HEMATOCRIT % 26.0* 28.7*  --   --  27.3*  --    HEMATOCRIT POC %  --   --   --  21*  --   --    PLATELETS 10*3/mm3 154 172  --   --  181  --    INR   --   --   --   --   --  1.27*   PROCALCITONIN ng/mL  --   --  0.15  --   --   --      Results from last 7 days   Lab Units 19  0712 19  0425 19  0639 19  0405 19  0357   SODIUM mmol/L 140 141  --   --  142   POTASSIUM mmol/L 4.0 4.6  --   --  4.9   CHLORIDE mmol/L 101 104  --   --  106   CO2 mmol/L 27.0 24.0  --   --  25.0   BUN mg/dL 41* 40*  --   --   33*   CREATININE mg/dL 1.39* 1.28*  --  1.30 1.06*   GLUCOSE mg/dL 87 70  --   --  91   CALCIUM mg/dL 6.9* 7.0*  --   --  7.4*   ALT (SGPT) U/L  --   --   --   --  <5  <5   AST (SGOT) U/L  --   --   --   --  16  16   TROPONIN T ng/mL  --   --  0.024  --  0.033*   PROBNP pg/mL  --   --   --   --  7,535.0*     Estimated Creatinine Clearance: 22.6 mL/min (A) (by C-G formula based on SCr of 1.39 mg/dL (H)).    Microbiology Results Abnormal     Procedure Component Value - Date/Time    Blood Culture - Blood, Arm, Left [[id number]] Collected:  09/04/19 0530    Lab Status:  Preliminary result Specimen:  Blood from Arm, Left Updated:  09/06/19 0645     Blood Culture No growth at 2 days    Blood Culture - Blood, Arm, Right [[id number]] Collected:  09/04/19 0500    Lab Status:  Preliminary result Specimen:  Blood from Arm, Right Updated:  09/06/19 0645     Blood Culture No growth at 2 days    MRSA Screen, PCR - Swab, Nares [[id number]]  (Normal) Collected:  09/04/19 1800    Lab Status:  Final result Specimen:  Swab from Nares Updated:  09/1934     MRSA PCR Negative    Narrative:       MRSA Negative          Imaging Results (last 24 hours)     ** No results found for the last 24 hours. **          Results for orders placed during the hospital encounter of 08/19/19   Adult Transthoracic Echo Complete With Contrast if Necessary Per Protocol    Narrative · The left ventricular cavity is moderate-to-severely dilated.  · Left atrial cavity size is moderate-to-severely dilated.  · Moderate aortic valve regurgitation is present.  · Moderate-to-severe mitral valve regurgitation is present.  · Mild tricuspid valve regurgitation is present.  · Mild pulmonic valve regurgitation is present.  · Mild dilation of the aortic root and of the sinuses Valsalva present.  · Left ventricular wall thickness is consistent with mild concentric   hypertrophy.  · Estimated EF = 24%.  · Left ventricular systolic function is severely decreased.  · The  following left ventricular wall segments are hypokinetic: basal   anterolateral, basal inferolateral, mid inferolateral, apical inferior,   mid inferior, basal inferoseptal, basal anterior, basal inferior and basal   inferoseptal. The following left ventricular wall segments are akinetic:   mid anterior, apical anterior, mid anterolateral, apical lateral, apical   septal, mid inferoseptal, apex and mid anteroseptal.  · The findings are consistent with dilated cardiomyopathy.  · Normal right ventricular cavity size and wall thickness with moderately   reduced right ventricular systolic function noted; abnormal septal motion   consistent with RV pacemaker.  · The aortic valve exhibits mild sclerosis.  · No evidence of pulmonary hypertension is present.  · There is a trivial pericardial effusion adjacent to the left ventricle.          I have reviewed the medications:  Scheduled Meds:    carvedilol 12.5 mg Oral BID With Meals   donepezil 5 mg Oral Nightly   ferrous sulfate 325 mg Oral Daily With Breakfast   folic acid 1 mg Oral Daily   furosemide 20 mg Intravenous Q12H   ipratropium-albuterol 3 mL Nebulization 4x Daily - RT   levothyroxine 50 mcg Oral Q AM   lisinopril 2.5 mg Oral Daily   mirtazapine 15 mg Oral Daily   pharmacy consult - MTM  Does not apply Daily   piperacillin-tazobactam 3.375 g Intravenous Q8H   sertraline 50 mg Oral Daily     Continuous Infusions:   PRN Meds:.•  acetaminophen  •  diclofenac  •  ipratropium-albuterol  •  sodium chloride      Assessment/Plan   Assessment / Plan     Active Hospital Problems    Diagnosis  POA   • **Acute on chronic congestive heart failure (CMS/HCC) [I50.9]  Yes   • Acute on chronic anemia [D64.9]  Yes   • HCAP (healthcare-associated pneumonia) [J18.9]  Yes   • Hypothyroidism (acquired) [E03.9]  Yes   • Dementia [F03.90]  Yes   • Elevated troponin [R74.8]  Yes   • Pacemaker [Z95.0]  Yes   • Acute and chronic respiratory failure with hypoxia (CMS/HCC) [J96.21]  Yes   •  CKD (chronic kidney disease) stage 3, GFR 30-59 ml/min (CMS/HCC) [N18.3]  Yes   • Idiopathic cardiomyopathy (CMS/HCC) [I42.8]  Yes   • Hyperlipidemia [E78.5]  Yes   • Essential hypertension [I10]  Yes      Resolved Hospital Problems   No resolved problems to display.        Brief Hospital Course to date:  Imelda Maldonado is a 89 y.o. female with hx of dementia, CAD, chronic systolic CHF (EF 25%) s/p BiV-ICD, HTN, HLD, remote DVT, GI bleed in 2013, hx of C.diff, and Afib (not anticoagulated due to hx of GI bleed) who presents due to SOA and hypoxia. Recent hospitalization 8/19-8/21 due to CHF exacerbation. CTA chest in the ER showed no PE but moderate bilateral pleural effusions, alveolar disease worse on the left with near complete consolidation of the left lower lobe. proBNP elevated at 7535 with mild elevation in troponin 0.033.  Admitted for treatment of CHF and HCAP.     PLAN:  --Continue Zosyn for now. ID following. Blood cultures NGTD.  --Appreciate Cardiology seeing patient. Dr. Cardozo has nothing else to add. Recommends hospice for end stage heart failure.  --Anemia improved s/p 1 unit PRBC on admission. No signs of acute blood loss. Monitor.   --Discussed with daughter Earlene by phone (lives [place] of [place]) on 9/5. Agreeable to Palliative and Hospice consults. Will continue with ATBx and IV Lasix in the mean time, attempt to wean oxygen. Likely will go back to facility with Hospice.    DVT Prophylaxis:  mechanical    Disposition: I expect the patient to be discharged likely back to [facility] rehab with hospice in ~1 day    CODE STATUS:   Code Status and Medical Interventions:   Ordered at: 09/04/19 0657     Code Status:    No CPR     Medical Interventions (Level of Support Prior to Arrest):    Comfort Measures     Comments:    per advanced directive on chart         Electronically signed by Beverly Glasgow MD, 09/06/19, 11:33 AM.

## 2019-09-06 NOTE — PLAN OF CARE
Problem: Patient Care Overview  Goal: Interprofessional Rounds/Family Conf  Outcome: Ongoing (interventions implemented as appropriate)  Palliative Team Attendance 1300. Chuy Cavazos DO, Kierra JOHNSON, Fabio RN CHPN, , Emily Mendenhall RN PN, Roberta Henderson [facility], Sari JOHNSON, Barbi Reed RN CHPN   09/06/19 1300   Interdisciplinary Rounds/Family Conf   Summary Pt is pleasantly confused and up in the chair. Pt is drowsy but comfortable. Plans to return to [facility] and rehab with hospice.

## 2019-09-06 NOTE — DISCHARGE PLACEMENT REQUEST
"Imelda Rdz (89 y.o. Female)   Referral for home hospice per Dr Glasgow on 09/05/19  PCP at [facility] is Dr Oh          Date of Birth Social Security Number Address Home Phone MRN    04/29/1923  [address] [place] [phone] [id number]    [place]ondo[place] Marital Status          None        Admission Date Admission Type Admitting Provider Attending Provider Department, Room/Bed    9/4/19 Emergency Beverly Glasgow MD Reddy, Mayuri V, MD [facility] 6B, N630/1    Discharge Date Discharge Disposition Discharge Destination                       Attending Provider:  Beverly Glasgow MD    Allergies:  No Known Allergies    Isolation:  None   Infection:  None   Code Status:  No CPR    Ht:  162.6 cm (64.02\")   Wt:  60.5 kg (133 lb 6.1 oz)    Admission Cmt:  None   Principal Problem:  Acute on chronic congestive heart failure (CMS/HCC) [I50.9]                 Active Insurance as of 9/4/2019     Primary Coverage     Payor Plan Insurance Group Employer/Plan Group    MEDICARE MEDICARE A & B      Payor Plan Address Payor Plan Phone Number Payor Plan Fax Number Effective Dates    [address] [phone]  4/1/1988 - None Entered    [place] [id number]       Subscriber Name Subscriber Birth Date Member ID       IMELDA RDZ 4/29/1923 [id number]                 Emergency Contacts      (Rel.) Home Phone Work Phone Mobile Phone    Earlene Fang (Daughter) [phone] -- --            Insurance Information                MEDICARE/MEDICARE A & B Phone: [phone]    Subscriber: Imelda Rdz Subscriber#: [id number]    Group#:  Precert#:           Problem List           Codes Noted - Resolved       Hospital    Acute on chronic anemia ICD-10-CM: D64.9  ICD-9-CM: 285.9 9/5/2019 - Present    HCAP (healthcare-associated pneumonia) ICD-10-CM: J18.9  ICD-9-CM: 486 9/4/2019 - Present    * (Principal) Acute on chronic congestive heart failure (CMS/HCC) " ICD-10-CM: I50.9  ICD-9-CM: 428.0 2019 - Present    Hypothyroidism (acquired) ICD-10-CM: E03.9  ICD-9-CM: 244.9 2019 - Present    Dementia ICD-10-CM: F03.90  ICD-9-CM: 294.20 2019 - Present    Elevated troponin ICD-10-CM: R74.8  ICD-9-CM: 790.6 2019 - Present    Pacemaker ICD-10-CM: Z95.0  ICD-9-CM: V45.01 1/10/2019 - Present    Acute and chronic respiratory failure with hypoxia (CMS/HCC) ICD-10-CM: J96.21  ICD-9-CM: 518.84, 799.02 10/1/2018 - Present    CKD (chronic kidney disease) stage 3, GFR 30-59 ml/min (CMS/HCC) ICD-10-CM: N18.3  ICD-9-CM: 585.3 10/1/2018 - Present    Idiopathic cardiomyopathy (CMS/HCC) ICD-10-CM: I42.8  ICD-9-CM: 425.4 Unknown - Present    Hyperlipidemia ICD-10-CM: E78.5  ICD-9-CM: 272.4 Unknown - Present    Essential hypertension ICD-10-CM: I10  ICD-9-CM: 401.9 Unknown - Present       Non-Hospital    Arthritis ICD-10-CM: M19.90  ICD-9-CM: 716.90 Unknown - Present    Gout ICD-10-CM: M10.9  ICD-9-CM: 274.9 Unknown - Present    DVT (deep venous thrombosis) (CMS/HCC) ICD-10-CM: I82.409  ICD-9-CM: 453.40 Unknown - Present    CRI (chronic renal insufficiency) ICD-10-CM: N18.9  ICD-9-CM: 585.9 Unknown - Present    Hypotension ICD-10-CM: I95.9  ICD-9-CM: 458.9 Unknown - Present             History & Physical      Beverly Glasgow MD at 2019  6:43 AM              [facility] Medicine Services  HISTORY AND PHYSICAL    Patient Name: Imelda Maldonado  : 1923  MRN: [id number]  Primary Care Physician: Leyla Castro DO  Date of admission: 2019      Subjective   Subjective     Chief Complaint:  SOB, chest discomfort    HPI:  Imelda Maldonado is a 89 y.o. female significant for chronic systolic heart failure, BiV-ICD, CAD, CKD 3, Afib, DVT, GI bleed, and dementia who presents to the ED from [facility] with complaint of shortness of breath.  Pt is a poor historian, but per the ED, patient was found to be SOB and hypoxic.  She is currently  satting 91% on 3LNC.  Pt also found to have persistent elevated troponin at .033.  Pt was recently hospitalized 8/19 with CHF exacerbation.      Patient does have history of dementia and is a poor historian, but she is complaining of SOB. She has audible rhonchi.  Imaging showed LLL consolidation and significant airspace disease.  Pt given vanc/zosyn, duonebs.      Pt is reported to be a DNR. Hospital medicine will admit the patient for further evaluatoin and treatment.     Review of Systems   Unable to perform ROS: Dementia   Respiratory: Positive for chest tightness and shortness of breath.    All other systems reviewed and are negative.         All other systems reviewed and are negative.     Personal History     Past Medical History:   Diagnosis Date   • Acute GI bleeding     12/09/2013 - 12/24/2013. Status post 7 units of packed red blood cells. Esophagogastroduodenoscopy (no active bleed) and colonoscopy (active bleeding from flat cecal polyps and two ascending colonic polyps) suggestive of non-steroidal anti-inflammatory drug colopathy or mild ischemia. Positive for Clostridium difficile status post 14 days of antibiotics.     • Arthritis     reinitiation of celebrex, October 2013   • CRI (chronic renal insufficiency)     mild   • DVT (deep venous thrombosis) (CMS/HCC)     RLE   • Gout    • Histoplasmosis    • Hyperlipidemia    • Hypertension    • Hypotension     symptoms of dizziness   • Idiopathic cardiomyopathy (CMS/HCC)        Past Surgical History:   Procedure Laterality Date   • APPENDECTOMY     • HYSTERECTOMY     • PACEMAKER IMPLANTATION      change out on 10/30/15 without complication   • POLYPECTOMY     • THYROIDECTOMY      chronic replacement therapy       Family History: family history includes Arthritis in her other; Diabetes in her other; Heart disease in her other; Hypertension in her other; Thyroid disease in her other. Otherwise pertinent FHx was reviewed and unremarkable.     Social History:   reports that she has quit smoking. Her smoking use included cigarettes. She has never used smokeless tobacco. She reports that she does not use drugs.  Social History     Social History Narrative   • Not on file       Medications:    Available home medication information reviewed.  Medications Prior to Admission   Medication Sig Dispense Refill Last Dose   • acetaminophen (TYLENOL) 325 MG tablet Take 650 mg by mouth Every 6 (Six) Hours As Needed for mild pain (1-3).   Taking   • bisacodyl (BISAC-EVAC) 10 MG suppository Insert 10 mg into the rectum Daily As Needed for constipation.   Taking   • carvedilol (COREG) 12.5 MG tablet Take 12.5 mg by mouth 2 (Two) Times a Day With Meals.   9/3/2019 at 2138   • diclofenac (VOLTAREN) 1 % gel gel Apply 4 g topically to the appropriate area as directed 2 (Two) Times a Day As Needed.   Taking   • donepezil (ARICEPT) 5 MG tablet Take 5 mg by mouth Every Night.   9/3/2019 at 2138   • ferrous sulfate 325 (65 FE) MG tablet Take 325 mg by mouth Daily With Breakfast.   9/3/2019 at 1131   • folic acid (FOLVITE) 1 MG tablet Take 1 mg by mouth Daily.   9/3/2019 at 1131   • furosemide (LASIX) 40 MG tablet Take 1 tablet by mouth 3 (Three) Times a Week.   9/2/2019 at 2128   • levothyroxine (SYNTHROID, LEVOTHROID) 50 MCG tablet Take 50 mcg by mouth Daily.   9/3/2019 at 0505   • lisinopril (PRINIVIL,ZESTRIL) 2.5 MG tablet Take 2.5 mg by mouth Daily.   9/3/2019 at 1131   • Magnesium Hydroxide (MILK OF MAGNESIA PO) Take 30 mL by mouth Daily As Needed.   5/22/2019 at 1114   • mirtazapine (REMERON) 7.5 MG half tablet Take 15 mg by mouth Daily.   9/3/2019 at 0938   • nitroglycerin (NITROSTAT) 0.4 MG SL tablet Place 0.4 mg under the tongue Every 5 (Five) Minutes As Needed for Chest Pain. Take no more than 3 doses in 15 minutes.   Taking   • sertraline (ZOLOFT) 50 MG tablet Take 50 mg by mouth Daily.   9/3/2019 at 2138       No Known Allergies    Objective   Objective     Vital Signs:   Temp:  [97.7  °F (36.5 °C)-98.3 °F (36.8 °C)] 98.3 °F (36.8 °C)  Heart Rate:  [73-75] 75  Resp:  [18-28] 26  BP: (143-154)/(62-68) 143/68        Physical Exam   Constitutional: Awake, alert, frail  Eyes: PERRLA, sclerae anicteric, no conjunctival injection  HENT: NCAT, mucous membranes moist  Neck: Supple, no thyromegaly, no lymphadenopathy, trachea midline  Respiratory: bilateral course rhonchi, mild tachypnea   Cardiovascular: RRR, no murmurs, rubs, or gallops, palpable pedal pulses bilaterally  Gastrointestinal: Positive bowel sounds, soft, nontender, nondistended  Musculoskeletal: No bilateral ankle edema, no clubbing or cyanosis to extremities  Psychiatric: demented  Neurologic: Oriented to self only, strength symmetric in all extremities, Cranial Nerves grossly intact to confrontation, speech clear  Skin: bruising to legs      Results Reviewed:  I have personally reviewed current lab and radiology data.    Results from last 7 days   Lab Units 09/04/19 0405 09/04/19  0357 09/04/19  0356   WBC 10*3/mm3  --  6.12  --    HEMOGLOBIN g/dL  --  7.8*  --    HEMOGLOBIN, POC g/dL 7.1*  --   --    HEMATOCRIT %  --  27.3*  --    HEMATOCRIT POC % 21*  --   --    PLATELETS 10*3/mm3  --  181  --    INR   --   --  1.27*     Results from last 7 days   Lab Units 09/04/19  0531 09/04/19 0405 09/04/19  0357   SODIUM mmol/L  --   --  142   POTASSIUM mmol/L  --   --  4.9   CHLORIDE mmol/L  --   --  106   CO2 mmol/L  --   --  25.0   BUN mg/dL  --   --  33*   CREATININE mg/dL  --  1.30 1.06*   GLUCOSE mg/dL  --   --  91   CALCIUM mg/dL  --   --  7.4*   ALT (SGPT) U/L  --   --  <5  <5   AST (SGOT) U/L  --   --  16  16   TROPONIN T ng/mL  --   --  0.033*   PROBNP pg/mL  --   --  7,535.0*   LACTATE mmol/L 1.0  --   --      Estimated Creatinine Clearance: 24.2 mL/min (by C-G formula based on SCr of 1.3 mg/dL).  Brief Urine Lab Results  (Last result in the past 365 days)      Color   Clarity   Blood   Leuk Est   Nitrite   Protein   CREAT   Urine  HCG        10/01/18 0159 Yellow Clear Negative Trace Negative 30 mg/dL (1+)             Imaging Results (last 24 hours)     Procedure Component Value Units Date/Time    XR Chest 1 View [[id number]] Collected:  09/04/19 0515     Updated:  09/04/19 0517    Narrative:       CR Chest 1 Vw    INDICATION:   Short of air and hypoxia     COMPARISON:    Chest x-ray 8/19/2019.    FINDINGS:  Single portable AP view(s) of the chest.  Globular cardiomediastinal silhouette enlargement and left-sided pacer/fibrillator again seen. Severe alveolar disease left greater than right. See accompanying CT chest dictation. No pneumothorax.      Impression:       Globular cardiomediastinal silhouette enlargement with severe left greater the right alveolar disease. Left-sided pacer defibrillator.    Signer Name: Rosario Sullivan MD   Signed: 9/4/2019 5:15 AM   Workstation Name: [email or web address]    [facility]    CT Angiogram Chest With Contrast [[id number]] Collected:  09/04/19 0514     Updated:  09/04/19 0516    Narrative:       INDICATION:   Hypoxic, short of air, recent fracture right arm.    TECHNIQUE:   CT angiogram of the chest with contrast. 3-D reformatted images were acquired.  Radiation dose reduction techniques included automated exposure control or exposure modulation based on body size. Radiation audit for number of CT and nuclear cardiology  exams performed in the last year:  0. Dose recorded in the patient's chart.    COMPARISON:   Chest x-ray from 8/19/2019.    FINDINGS:   There is no evidence for pulmonary embolism. There is globular cardiac silhouette enlargement with small to moderate pericardial effusion. There is moderate right-sided pleural effusion. There is likely moderate to large left pleural effusion with  pleural fluid seen at the apex.. There is atherosclerotic vascular calcification involving the thoracic aorta and great vessels. The timing of the contrast bolus because the patient's heart failure  does not allow for assessment for thoracic aortic  dissection. The patient has a pacemaker.    There is severe alveolar disease left lung with near complete consolidation of the left lower lobe and considerable consolidation of the left upper lobe. Please correlate for clinical evidence of aspiration or pneumonia. There is patchier airspace  disease in the right lung including the right middle lobe, right lower lobe and right upper lobe. Many of the opacities are nodular and the possibility of metastatic disease or atypical infectious disease should also be considered.. Please correlate with  the patient's history. This study is not sensitive for adenopathy given the amount of pleural and parenchymal disease. There is at least partially calcified subcarinal node up to about 3.2 x 2.2 cm in dimension. Compression fracture seen at the  thoracolumbar junction. There may be a left adrenal mass lesion 3.4 x 2.4 cm. This is partly in the field-of-view.      Impression:       No evidence for pulmonary most.  2. Globular cardiac silhouette enlargement.  3. Bilateral pleural effusions.  4. Extensive alveolar disease asymmetrically worse on the left than the right. In addition to the airspace disease there are multiple bilateral nodular lesions in the lungs. This could be infectious or inflammatory in etiology but metastatic disease is  also in the differential.  5. Partially calcified subcarinal lymph node.  6. No pneumothorax  7. Concern for left adrenal mass partly in the field-of-view.    Signer Name: Rosario Sullivan MD   Signed: 9/4/2019 5:14 AM   Workstation Name: [email or web address]    [facility]        Results for orders placed during the hospital encounter of 08/19/19   Adult Transthoracic Echo Complete With Contrast if Necessary Per Protocol    Narrative · The left ventricular cavity is moderate-to-severely dilated.  · Left atrial cavity size is moderate-to-severely dilated.  · Moderate aortic valve  regurgitation is present.  · Moderate-to-severe mitral valve regurgitation is present.  · Mild tricuspid valve regurgitation is present.  · Mild pulmonic valve regurgitation is present.  · Mild dilation of the aortic root and of the sinuses Valsalva present.  · Left ventricular wall thickness is consistent with mild concentric   hypertrophy.  · Estimated EF = 24%.  · Left ventricular systolic function is severely decreased.  · The following left ventricular wall segments are hypokinetic: basal   anterolateral, basal inferolateral, mid inferolateral, apical inferior,   mid inferior, basal inferoseptal, basal anterior, basal inferior and basal   inferoseptal. The following left ventricular wall segments are akinetic:   mid anterior, apical anterior, mid anterolateral, apical lateral, apical   septal, mid inferoseptal, apex and mid anteroseptal.  · The findings are consistent with dilated cardiomyopathy.  · Normal right ventricular cavity size and wall thickness with moderately   reduced right ventricular systolic function noted; abnormal septal motion   consistent with RV pacemaker.  · The aortic valve exhibits mild sclerosis.  · No evidence of pulmonary hypertension is present.  · There is a trivial pericardial effusion adjacent to the left ventricle.          Assessment/Plan   Assessment / Plan     Active Hospital Problems    Diagnosis POA   • HCAP (healthcare-associated pneumonia) [J18.9] Yes   • Acute on chronic congestive heart failure (CMS/HCC) [I50.9] Yes   • Hypothyroidism (acquired) [E03.9] Yes   • Dementia [F03.90] Yes   • Elevated troponin [R74.8] Yes   • Pacemaker [Z95.0] Yes   • Acute and chronic respiratory failure with hypoxia (CMS/HCC) [J96.21] Yes   • CKD (chronic kidney disease) stage 3, GFR 30-59 ml/min (CMS/HCC) [N18.3] Yes   • Idiopathic cardiomyopathy (CMS/HCC) [I42.8] Yes     1. 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   a. Left bundle branch block.  b. History of  nonsustained ventricular ectopy.  c. “Normal” coronary arteries by catheterization, October 2003, echo ejection fraction of 30%, October 2003.  d. Echocardiogram, 01/27/2004:  Global hypokinesis, ejection fraction 25%, moderate mitral regurgitation.  e. Ankota BI-V pacemaker implantation by Dr. Naidu on 04/16/2010 also with a coronary sinus lead placement.    f. Patient refuses upgrade to an ICD and Coumadin, March 2010.    g. Atrial fibrillation with “failed” cardioversion (early recurrence), January 2010.  h. Biventricular pacemaker generator change, 10/30/2015.         • Hyperlipidemia [E78.5] Yes   • Essential hypertension [I10] Yes       1.  Acute on chronic hypoxic respiratory failure  - will continue supplemental oxygen to keep sats>91%  - duonebs prn    2. HCAP  - continue vanc/zosyn, consult ID  - sputum, blood cultures  - lactate pending    3. Acute on chronic CHF  - IV lasix prn, scheduled lasix PO  - consider cardiology consult pending clinical course    4.  Anemia  - transfuse 1 unit PRBC now    5. Hypothyroidism  - resume synthroid    6. Left renal mass  - consider hem/onc consult    7. Hypertension  - resume home meds    8. Dementia/FTT        DVT prophylaxis:  SCDs, heparin SQ    CODE STATUS:    Code Status and Medical Interventions:   Ordered at: 09/04/19 0657     Code Status:    No CPR     Medical Interventions (Level of Support Prior to Arrest):    Comfort Measures     Comments:    per advanced directive on chart       Admission Status:  I believe this patient meets INPATIENT status due to the need for care which can only be reasonably provided in an hospital setting due to HCAP, requiring IV antibiotics, oxygen, infectious disease consult.  As such, I feel patient’s risk for adverse outcomes and need for care warrant INPATIENT evaluation and predict the patient’s care encounter to likely last beyond 2 midnights.        Electronically signed by NASIM Sands, 09/04/19, 6:44  AM.      Brief Attending Admission Attestation     I have seen and examined the patient, performing an independent face-to-face diagnostic evaluation with plan of care reviewed and developed with the advanced practice clinician (APC).      Brief Summary Statement:   Imelda Maldonado is a 89 y.o. female with hx of dementia, CAD, chronic systolic CHF (EF 25%) s/p BiV-ICD, HTN, HLD, remote DVT, GI bleed in 2013, hx of C.diff, and Afib (not anticoagulated due to hx of GI bleed) who presents due to SOA and hypoxia. Recent hospitalization 8/19-8/21 due to CHF exacerbation. CTA chest in the ER showed no PE but moderate bilateral pleural effusions, alveolar disease worse on the left with near complete consolidation of the left lower lobe. proBNP elevated at 7535 with mild elevation in troponin 0.033.  Admitted for treatment of CHF and HCAP.    Remainder of detailed HPI is as noted above and has been reviewed and/or edited by me for completeness.      Attending Physical Exam:  Constitutional: Awake, alert, frail appearance  Eyes: PERRLA, sclerae anicteric, no conjunctival injection  HENT: NCAT, mucous membranes moist  Neck: Supple, no thyromegaly, no lymphadenopathy, trachea midline  Respiratory: coarse breath sounds bilaterally, nonlabored respirations   Cardiovascular: RRR, no murmurs, rubs, or gallops, palpable pedal pulses bilaterally  Gastrointestinal: Positive bowel sounds, soft, nontender, nondistended  Musculoskeletal: No bilateral ankle edema, no clubbing or cyanosis to extremities  Psychiatric: Appropriate affect, cooperative, pleasant  Neurologic: Oriented x 1, answers simple questions appropriately, strength symmetric in all extremities, Cranial Nerves grossly intact to confrontation, speech clear  Skin: No rashes        Brief Assessment/Plan :  See above for further detailed assessment and plan developed with APC which I have reviewed and/or edited for completeness.    PLAN:  --Intermittent diuresis. Cardiology  "consulted.   --Continue HCAP coverage. ID consulted. Have stopped Vanc and will continue on Zosyn for now.  --Transfuse 1 unit for Hb 7.1. No signs of acute blood loss. Monitor.  --She has recurrent hospitalizations for CHF exacerbations, this is her second one in just 2 weeks. May want to involve Palliative Care at some point given her advanced age, dementia, and multiple comorbidities.  --Incidental finding of left adrenal mass on imaging. Will defer further workup at this time, can be pursued as outpatient if desired by family.    Electronically signed by Beverly Glasgow MD, 09/04/19, 12:22 PM             Electronically signed by Beverly Glasgow MD at 9/4/2019 12:24 PM          Emergency Department Notes      Dung Valdez MD at 9/4/2019  4:09 AM          Subjective   89-year-old female with a history of dementia presents for evaluation of shortness of breath.  Of note, the patient has a long past medical history and wears home oxygen at all times.  She lives in a nursing home.  She reportedly had a recent \"shoulder fracture.\"  She is unable to provide us with any history at this time.  According to EMS personnel and nursing home staff, she has been complaining of increased shortness of breath over the past 24 hours.  She had an outpatient chest x-ray that reportedly showed a \"right pleural effusion.\"  No fevers.  She has had a productive cough.  She was subsequently brought to the ED to be evaluated.            Review of Systems   Unable to perform ROS: Dementia   Respiratory: Positive for cough and shortness of breath.        Past Medical History:   Diagnosis Date   • Acute GI bleeding     12/09/2013 - 12/24/2013. Status post 7 units of packed red blood cells. Esophagogastroduodenoscopy (no active bleed) and colonoscopy (active bleeding from flat cecal polyps and two ascending colonic polyps) suggestive of non-steroidal anti-inflammatory drug colopathy or mild ischemia. Positive for Clostridium " difficile status post 14 days of antibiotics.     • Arthritis     reinitiation of celebrex, October 2013   • CRI (chronic renal insufficiency)     mild   • DVT (deep venous thrombosis) (CMS/HCC)     RLE   • Gout    • Histoplasmosis    • Hyperlipidemia    • Hypertension    • Hypotension     symptoms of dizziness   • Idiopathic cardiomyopathy (CMS/HCC)        No Known Allergies    Past Surgical History:   Procedure Laterality Date   • APPENDECTOMY     • HYSTERECTOMY     • PACEMAKER IMPLANTATION      change out on 10/30/15 without complication   • POLYPECTOMY     • THYROIDECTOMY      chronic replacement therapy       Family History   Problem Relation Age of Onset   • Arthritis Other    • Diabetes Other    • Heart disease Other    • Hypertension Other    • Thyroid disease Other        Social History     Socioeconomic History   • Marital status:      Spouse name: Not on file   • Number of children: Not on file   • Years of education: Not on file   • Highest education level: Not on file   Tobacco Use   • Smoking status: Former Smoker     Types: Cigarettes   • Smokeless tobacco: Never Used   Substance and Sexual Activity   • Drug use: No   • Sexual activity: Defer           Objective   Physical Exam   Constitutional: She is oriented to person, place, and time. She appears well-developed and well-nourished. No distress.   Nontoxic-appearing elderly female   HENT:   Head: Normocephalic and atraumatic.   Mouth/Throat: Oropharynx is clear and moist.   Eyes: EOM are normal. Pupils are equal, round, and reactive to light.   Neck: Normal range of motion. Neck supple. No JVD present.   Cardiovascular: Normal rate, regular rhythm and normal heart sounds. Exam reveals no gallop and no friction rub.   No murmur heard.  Pulmonary/Chest: Effort normal. No respiratory distress. She has no wheezes. She has no rales.   Nonlabored breathing, coarse breath sounds noted on right when compared to left, no accessory muscle use, no  retractions   Abdominal: Soft. Bowel sounds are normal. She exhibits no distension and no mass. There is no tenderness. There is no rebound and no guarding.   Musculoskeletal: Normal range of motion.        Right lower leg: She exhibits no edema.        Left lower leg: She exhibits no edema.   Neurological: She is alert and oriented to person, place, and time.   Skin: Skin is warm and dry. No rash noted. She is not diaphoretic. No erythema.   Psychiatric: She has a normal mood and affect. Judgment and thought content normal.   Nursing note and vitals reviewed.      Procedures          ED Course  ED Course as of Sep 04 0550   Wed Sep 04, 2019   0412 89-year-old female sent from the nursing home to be evaluated for cough and shortness of breath.  Patient unable to provide any history.  On arrival to the ED, patient nontoxic-appearing with nonlabored breathing and normal oxygen saturations on 2 L.  Moderate risk Well's.  EKG revealed a paced rhythm with a heart rate of 76 with occasional intrinsic beats with market ST depression in precordial leads that appears new when compared to prior EKGs.  [DD]   0525 Labs remarkable for minimally elevated troponin.  Aspirin given.  BNP significantly elevated.  Patient anemic with a hemoglobin of 7.8.  Chest CTA suggestive of pneumonia.  HCAP coverage initiated.  I discussed the patient's case with Dr. Mane, and the patient will be admitted under her care for further evaluation and treatment.  She is hemodynamically stable at this time.  [DD]      ED Course User Index  [DD] Dung Valdez MD         Final Diagnosis: as of Sep 04 0550   HCAP (healthcare-associated pneumonia)   Shortness of breath   Elevated troponin   Anemia, unspecified type          Recent Results (from the past 24 hour(s))   Light Blue Top    Collection Time: 09/04/19  3:56 AM   Result Value Ref Range    Extra Tube hold for add-on    Gold Top - SST    Collection Time: 09/04/19  3:56 AM   Result Value Ref  Range    Extra Tube Hold for add-ons.    Protime-INR    Collection Time: 09/04/19  3:56 AM   Result Value Ref Range    Protime 15.3 (H) 11.2 - 14.3 Seconds    INR 1.27 (H) 0.85 - 1.16   Comprehensive Metabolic Panel    Collection Time: 09/04/19  3:57 AM   Result Value Ref Range    Glucose 91 65 - 99 mg/dL    BUN 33 (H) 8 - 23 mg/dL    Creatinine 1.06 (H) 0.57 - 1.00 mg/dL    Sodium 142 136 - 145 mmol/L    Potassium 4.9 3.5 - 5.2 mmol/L    Chloride 106 98 - 107 mmol/L    CO2 25.0 22.0 - 29.0 mmol/L    Calcium 7.4 (L) 8.2 - 9.6 mg/dL    Total Protein 7.7 6.0 - 8.5 g/dL    Albumin 3.10 (L) 3.50 - 5.20 g/dL    ALT (SGPT) <5 1 - 33 U/L    AST (SGOT) 16 1 - 32 U/L    Alkaline Phosphatase 84 39 - 117 U/L    Total Bilirubin 0.3 0.2 - 1.2 mg/dL    eGFR Non African Amer 48 (L) >60 mL/min/1.73    Globulin 4.6 gm/dL    A/G Ratio 0.7 g/dL    BUN/Creatinine Ratio 31.1 (H) 7.0 - 25.0    Anion Gap 11.0 5.0 - 15.0 mmol/L   BNP    Collection Time: 09/04/19  3:57 AM   Result Value Ref Range    proBNP 7,535.0 (H) 5.0-1,800.0 pg/mL   Troponin    Collection Time: 09/04/19  3:57 AM   Result Value Ref Range    Troponin T 0.033 (C) 0.000 - 0.030 ng/mL   Green Top (Gel)    Collection Time: 09/04/19  3:57 AM   Result Value Ref Range    Extra Tube Hold for add-ons.    Lavender Top    Collection Time: 09/04/19  3:57 AM   Result Value Ref Range    Extra Tube hold for add-on    CBC Auto Differential    Collection Time: 09/04/19  3:57 AM   Result Value Ref Range    WBC 6.12 3.40 - 10.80 10*3/mm3    RBC 2.55 (L) 3.77 - 5.28 10*6/mm3    Hemoglobin 7.8 (L) 12.0 - 15.9 g/dL    Hematocrit 27.3 (L) 34.0 - 46.6 %    .1 (H) 79.0 - 97.0 fL    MCH 30.6 26.6 - 33.0 pg    MCHC 28.6 (L) 31.5 - 35.7 g/dL    RDW 16.4 (H) 12.3 - 15.4 %    RDW-SD 63.2 (H) 37.0 - 54.0 fl    MPV 9.1 6.0 - 12.0 fL    Platelets 181 140 - 450 10*3/mm3    Neutrophil % 78.2 (H) 42.7 - 76.0 %    Lymphocyte % 12.1 (L) 19.6 - 45.3 %    Monocyte % 7.5 5.0 - 12.0 %    Eosinophil % 1.5  0.3 - 6.2 %    Basophil % 0.2 0.0 - 1.5 %    Immature Grans % 0.5 0.0 - 0.5 %    Neutrophils, Absolute 4.79 1.70 - 7.00 10*3/mm3    Lymphocytes, Absolute 0.74 0.70 - 3.10 10*3/mm3    Monocytes, Absolute 0.46 0.10 - 0.90 10*3/mm3    Eosinophils, Absolute 0.09 0.00 - 0.40 10*3/mm3    Basophils, Absolute 0.01 0.00 - 0.20 10*3/mm3    Immature Grans, Absolute 0.03 0.00 - 0.05 10*3/mm3    nRBC 0.0 0.0 - 0.2 /100 WBC   Hepatic Function Panel    Collection Time: 09/04/19  3:57 AM   Result Value Ref Range    Total Protein 7.7 6.0 - 8.5 g/dL    Albumin 3.10 (L) 3.50 - 5.20 g/dL    ALT (SGPT) <5 1 - 33 U/L    AST (SGOT) 16 1 - 32 U/L    Alkaline Phosphatase 84 39 - 117 U/L    Total Bilirubin 0.3 0.2 - 1.2 mg/dL    Bilirubin, Direct <0.2 (L) 0.2 - 0.3 mg/dL    Bilirubin, Indirect     POC CHEM 8    Collection Time: 09/04/19  4:05 AM   Result Value Ref Range    Glucose 86 70 - 130 mg/dL    BUN 31 (H) 8 - 26 mg/dL    Creatinine 1.30 0.60 - 1.30 mg/dL    Sodium 141 138 - 146 mmol/L    Potassium 4.7 3.5 - 4.9 mmol/L    Chloride 107 98 - 109 mmol/L    Total CO2 24 24 - 29 mmol/L    Hemoglobin 7.1 (L) 12.0 - 17.0 g/dL    Hematocrit 21 (L) 38 - 51 %    Ionized Calcium 1.07 (L) 1.20 - 1.32 mmol/L   Lactic Acid, Plasma    Collection Time: 09/04/19  5:31 AM   Result Value Ref Range    Lactate 1.0 0.5 - 2.0 mmol/L     Note: In addition to lab results from this visit, the labs listed above may include labs taken at another facility or during a different encounter within the last 24 hours. Please correlate lab times with ED admission and discharge times for further clarification of the services performed during this visit.    XR Chest 1 View   Final Result   Globular cardiomediastinal silhouette enlargement with severe left greater the right alveolar disease. Left-sided pacer defibrillator.      Signer Name: Rosario Sullivan MD    Signed: 9/4/2019 5:15 AM    Workstation Name: [email or web address]     [facility]      CT Angiogram  "Chest With Contrast   Final Result   No evidence for pulmonary most.   2. Globular cardiac silhouette enlargement.   3. Bilateral pleural effusions.   4. Extensive alveolar disease asymmetrically worse on the left than the right. In addition to the airspace disease there are multiple bilateral nodular lesions in the lungs. This could be infectious or inflammatory in etiology but metastatic disease is   also in the differential.   5. Partially calcified subcarinal lymph node.   6. No pneumothorax   7. Concern for left adrenal mass partly in the field-of-view.      Signer Name: Rosario Sullivan MD    Signed: 9/4/2019 5:14 AM    Workstation Name: [email or web address]     [facility]        Vitals:    09/04/19 0400 09/04/19 0500 09/04/19 0619 09/04/19 0637   BP: 153/63 147/62 143/68    BP Location:   Right arm    Patient Position:   Lying    Pulse: 75 74 75 75   Resp:  18 28 26   Temp:   98.3 °F (36.8 °C)    TempSrc:   Oral    SpO2: 92% 94% 93% 93%   Weight:   60.5 kg (133 lb 6.1 oz)    Height:   162.6 cm (64.02\")      Medications   sodium chloride 0.9 % flush 10 mL (not administered)   piperacillin-tazobactam (ZOSYN) 3.375 g in iso-osmotic dextrose 50 ml (premix) (not administered)     And   Pharmacy to dose vancomycin (not administered)   ipratropium-albuterol (DUO-NEB) nebulizer solution 3 mL (3 mL Nebulization Given 9/4/19 0637)   vancomycin (dosing per levels) (not administered)   furosemide (LASIX) injection 40 mg (not administered)   iopamidol (ISOVUE-300) 61 % injection 100 mL (50 mL Intravenous Given 9/4/19 0430)   vancomycin 1250 mg/250 mL 0.9% NS IVPB ([facility]) (1,250 mg Intravenous New Bag 9/4/19 0552)   piperacillin-tazobactam (ZOSYN) 3.375 g in iso-osmotic dextrose 50 ml (premix) (0 g Intravenous Stopped 9/4/19 0552)   aspirin suppository 300 mg (300 mg Rectal Given 9/4/19 0549)   furosemide (LASIX) injection 40 mg (40 mg Intravenous Given 9/4/19 0647)     ECG/EMG Results (last 24 hours)     Procedure " Component Value Units Date/Time    ECG 12 Lead [[id number]] Collected:  09/04/19 0355     Updated:  09/04/19 0649        ECG 12 Lead                     [facility]             Dung Valdez MD  09/04/19 0659      Electronically signed by Dung Valdez MD at 9/4/2019  6:59 AM       Hospital Medications (active)       Dose Frequency Start End    acetaminophen (TYLENOL) tablet 650 mg 650 mg Every 6 Hours PRN 9/4/2019     Sig - Route: Take 2 tablets by mouth Every 6 (Six) Hours As Needed for Mild Pain . - Oral    carvedilol (COREG) tablet 12.5 mg 12.5 mg 2 Times Daily With Meals 9/4/2019     Sig - Route: Take 1 tablet by mouth 2 (Two) Times a Day With Meals. - Oral    diclofenac (VOLTAREN) 1 % gel 4 g 4 g 2 Times Daily PRN 9/4/2019     Sig - Route: Apply 4 g topically to the appropriate area as directed 2 (Two) Times a Day As Needed (pain). - Topical    donepezil (ARICEPT) tablet 5 mg 5 mg Nightly 9/4/2019     Sig - Route: Take 1 tablet by mouth Every Night. - Oral    ferrous sulfate tablet 325 mg 325 mg Daily With Breakfast 9/4/2019     Sig - Route: Take 1 tablet by mouth Daily With Breakfast. - Oral    folic acid (FOLVITE) tablet 1 mg 1 mg Daily 9/4/2019     Sig - Route: Take 1 tablet by mouth Daily. - Oral    furosemide (LASIX) injection 20 mg 20 mg Every 12 Hours 9/5/2019     Sig - Route: Infuse 2 mL into a venous catheter Every 12 (Twelve) Hours. - Intravenous    ipratropium-albuterol (DUO-NEB) nebulizer solution 3 mL 3 mL Every 4 Hours PRN 9/4/2019     Sig - Route: Take 3 mL by nebulization Every 4 (Four) Hours As Needed for Wheezing or Shortness of Air. - Nebulization    ipratropium-albuterol (DUO-NEB) nebulizer solution 3 mL 3 mL 4 Times Daily - RT 9/5/2019     Sig - Route: Take 3 mL by nebulization 4 (Four) Times a Day. - Nebulization    levothyroxine (SYNTHROID, LEVOTHROID) tablet 50 mcg 50 mcg Every Early Morning 9/4/2019     Sig - Route: Take 1 tablet by mouth Every Morning. - Oral    lisinopril  "(PRINIVIL,ZESTRIL) tablet 2.5 mg 2.5 mg Daily 2019     Sig - Route: Take 1 tablet by mouth Daily. - Oral    mirtazapine (REMERON) tablet 15 mg 15 mg Daily 2019     Sig - Route: Take 1 tablet by mouth Daily. - Oral    Pharmacy Consult - [facility]  Daily 2019     Sig - Route: Daily. - Does not apply    piperacillin-tazobactam (ZOSYN) 3.375 g in iso-osmotic dextrose 50 ml (premix) 3.375 g Every 8 Hours 2019    Sig - Route: Infuse 50 mL into a venous catheter Every 8 (Eight) Hours. - Intravenous    Linked Group 1:  \"And\" Linked Group Details        sertraline (ZOLOFT) tablet 50 mg 50 mg Daily 2019     Sig - Route: Take 1 tablet by mouth Daily. - Oral    sodium chloride 0.9 % flush 10 mL 10 mL As Needed 2019     Sig - Route: Infuse 10 mL into a venous catheter As Needed for Line Care. - Intravenous    Cosign for Ordering: Accepted by Dung Valdez MD on 2019  7:00 AM             Physician Progress Notes (last 72 hours) (Notes from 9/3/2019 11:48 AM through 2019 11:48 AM)      Beverly Glasgow MD at 2019 11:30 AM              [facility] Medicine Services  PROGRESS NOTE    Patient Name: Imelda Maldonado  : 1923  MRN: [id number]    Date of Admission: 2019  Length of Stay: 1  Primary Care Physician: Leyla Castro DO    Subjective   Subjective     CC:  F/U SOA, hypoxia    HPI:  Patient seen this morning. Eating breakfast. No complaints. Down to 4 liters oxygen.    Review of Systems  CV-no chest pain, no palpitations  Resp-no cough, no dyspnea  GI-no N/V/D, no abd pain    All other systems reviewed and negative except any additional pertinent positives and negatives as discussed in HPI.    Objective   Objective     Vital Signs:   Temp:  [97 °F (36.1 °C)-97.9 °F (36.6 °C)] 97.5 °F (36.4 °C)  Heart Rate:  [74-80] 80  Resp:  [18] 18  BP: ()/(41-60) 112/46        Physical Exam:  Gen-no acute distress, frail appearance  HENT-NCAT, mucous " membranes moist  CV-RRR, S1 S2 normal, no m/r/g  Resp-improved breath sounds bilaterally, no wheezes, nonlabored  Abd-soft, NT, ND, +BS  Ext-no edema  Neuro-A&Ox1, answers simple questions, no focal deficits  Skin-no rashes  Psych-appropriate mood    Results Reviewed:    Results from last 7 days   Lab Units 09/06/19  0712 09/05/19  0424 09/04/19  1248 09/04/19  0405 09/04/19  0357 09/04/19  0356   WBC 10*3/mm3 4.82 5.21  --   --  6.12  --    HEMOGLOBIN g/dL 7.7* 8.7*  --   --  7.8*  --    HEMOGLOBIN, POC g/dL  --   --   --  7.1*  --   --    HEMATOCRIT % 26.0* 28.7*  --   --  27.3*  --    HEMATOCRIT POC %  --   --   --  21*  --   --    PLATELETS 10*3/mm3 154 172  --   --  181  --    INR   --   --   --   --   --  1.27*   PROCALCITONIN ng/mL  --   --  0.15  --   --   --      Results from last 7 days   Lab Units 09/06/19  0712 09/05/19 0425 09/04/19  0639 09/04/19  0405 09/04/19  0357   SODIUM mmol/L 140 141  --   --  142   POTASSIUM mmol/L 4.0 4.6  --   --  4.9   CHLORIDE mmol/L 101 104  --   --  106   CO2 mmol/L 27.0 24.0  --   --  25.0   BUN mg/dL 41* 40*  --   --  33*   CREATININE mg/dL 1.39* 1.28*  --  1.30 1.06*   GLUCOSE mg/dL 87 70  --   --  91   CALCIUM mg/dL 6.9* 7.0*  --   --  7.4*   ALT (SGPT) U/L  --   --   --   --  <5  <5   AST (SGOT) U/L  --   --   --   --  16  16   TROPONIN T ng/mL  --   --  0.024  --  0.033*   PROBNP pg/mL  --   --   --   --  7,535.0*     Estimated Creatinine Clearance: 22.6 mL/min (A) (by C-G formula based on SCr of 1.39 mg/dL (H)).    Microbiology Results Abnormal     Procedure Component Value - Date/Time    Blood Culture - Blood, Arm, Left [[id number]] Collected:  09/04/19 0530    Lab Status:  Preliminary result Specimen:  Blood from Arm, Left Updated:  09/06/19 0645     Blood Culture No growth at 2 days    Blood Culture - Blood, Arm, Right [[id number]] Collected:  09/04/19 0500    Lab Status:  Preliminary result Specimen:  Blood from Arm, Right Updated:  09/06/19 0645     Blood  Culture No growth at 2 days    MRSA Screen, PCR - Swab, Nares [[id number]]  (Normal) Collected:  09/04/19 1800    Lab Status:  Final result Specimen:  Swab from Nares Updated:  09/1934     MRSA PCR Negative    Narrative:       MRSA Negative          Imaging Results (last 24 hours)     ** No results found for the last 24 hours. **          Results for orders placed during the hospital encounter of 08/19/19   Adult Transthoracic Echo Complete With Contrast if Necessary Per Protocol    Narrative · The left ventricular cavity is moderate-to-severely dilated.  · Left atrial cavity size is moderate-to-severely dilated.  · Moderate aortic valve regurgitation is present.  · Moderate-to-severe mitral valve regurgitation is present.  · Mild tricuspid valve regurgitation is present.  · Mild pulmonic valve regurgitation is present.  · Mild dilation of the aortic root and of the sinuses Valsalva present.  · Left ventricular wall thickness is consistent with mild concentric   hypertrophy.  · Estimated EF = 24%.  · Left ventricular systolic function is severely decreased.  · The following left ventricular wall segments are hypokinetic: basal   anterolateral, basal inferolateral, mid inferolateral, apical inferior,   mid inferior, basal inferoseptal, basal anterior, basal inferior and basal   inferoseptal. The following left ventricular wall segments are akinetic:   mid anterior, apical anterior, mid anterolateral, apical lateral, apical   septal, mid inferoseptal, apex and mid anteroseptal.  · The findings are consistent with dilated cardiomyopathy.  · Normal right ventricular cavity size and wall thickness with moderately   reduced right ventricular systolic function noted; abnormal septal motion   consistent with RV pacemaker.  · The aortic valve exhibits mild sclerosis.  · No evidence of pulmonary hypertension is present.  · There is a trivial pericardial effusion adjacent to the left ventricle.          I have reviewed  the medications:  Scheduled Meds:    carvedilol 12.5 mg Oral BID With Meals   donepezil 5 mg Oral Nightly   ferrous sulfate 325 mg Oral Daily With Breakfast   folic acid 1 mg Oral Daily   furosemide 20 mg Intravenous Q12H   ipratropium-albuterol 3 mL Nebulization 4x Daily - RT   levothyroxine 50 mcg Oral Q AM   lisinopril 2.5 mg Oral Daily   mirtazapine 15 mg Oral Daily   pharmacy consult - MTM  Does not apply Daily   piperacillin-tazobactam 3.375 g Intravenous Q8H   sertraline 50 mg Oral Daily     Continuous Infusions:   PRN Meds:.•  acetaminophen  •  diclofenac  •  ipratropium-albuterol  •  sodium chloride      Assessment/Plan   Assessment / Plan     Active Hospital Problems    Diagnosis  POA   • **Acute on chronic congestive heart failure (CMS/HCC) [I50.9]  Yes   • Acute on chronic anemia [D64.9]  Yes   • HCAP (healthcare-associated pneumonia) [J18.9]  Yes   • Hypothyroidism (acquired) [E03.9]  Yes   • Dementia [F03.90]  Yes   • Elevated troponin [R74.8]  Yes   • Pacemaker [Z95.0]  Yes   • Acute and chronic respiratory failure with hypoxia (CMS/HCC) [J96.21]  Yes   • CKD (chronic kidney disease) stage 3, GFR 30-59 ml/min (CMS/HCC) [N18.3]  Yes   • Idiopathic cardiomyopathy (CMS/HCC) [I42.8]  Yes   • Hyperlipidemia [E78.5]  Yes   • Essential hypertension [I10]  Yes      Resolved Hospital Problems   No resolved problems to display.        Brief Hospital Course to date:  Imelda Maldonado is a 89 y.o. female with hx of dementia, CAD, chronic systolic CHF (EF 25%) s/p BiV-ICD, HTN, HLD, remote DVT, GI bleed in 2013, hx of C.diff, and Afib (not anticoagulated due to hx of GI bleed) who presents due to SOA and hypoxia. Recent hospitalization 8/19-8/21 due to CHF exacerbation. CTA chest in the ER showed no PE but moderate bilateral pleural effusions, alveolar disease worse on the left with near complete consolidation of the left lower lobe. proBNP elevated at 7535 with mild elevation in troponin 0.033.  Admitted for  treatment of CHF and HCAP.     PLAN:  --Continue Zosyn for now. ID following. Blood cultures NGTD.  --Appreciate Cardiology seeing patient. Dr. Cardozo has nothing else to add. Recommends hospice for end stage heart failure.  --Anemia improved s/p 1 unit PRBC on admission. No signs of acute blood loss. Monitor.   --Discussed with daughter Earlene by phone (lives out of state) on 9/5. Agreeable to Palliative and Hospice consults. Will continue with ATBx and IV Lasix in the mean time, attempt to wean oxygen. Likely will go back to facility with Hospice.    DVT Prophylaxis:  mechanical    Disposition: I expect the patient to be [facility] ~1 day    CODE STATUS:   Code Status and Medical Interventions:   Ordered at: 09/04/19 0657     Code Status:    No CPR     Medical Interventions (Level of Support Prior to Arrest):    Comfort Measures     Comments:    per advanced directive on chart         Electronically signed by Beverly Glasgow MD, 09/06/19, 11:33 AM.        Electronically signed by Beverly Glasgow MD at 9/6/2019 11:34 AM     Didier Del Real MD at 9/6/2019 10:43 AM          Infectious Disease Progress Note  Imelda Maldonado  4/29/1923  [id number]    Date of Consult: 9/4/2019  Admission Date: 9/4/2019    Requesting Provider: Beverly GUZMAN MD  Evaluating Physician: Didier Del Real MD    Chief Complaint: shortness of breath, cough    Reason for Consultation: possible pneumonia    History of present illness:   Patient is a 89 y.o.  Yr old female with history of idiopathic cardiomyopathy (EF 20%), osteoarthritis, CKD, DVT, gout, pacemaker/ICD, dementia. Recent admission from 8/19 to 8/21 with heart failure, tx with diuresis.    Presented back to [facility] ED on 9/4 from SNF due to concerns of of worsening shortness of breath and productive cough. Afebrile.  Unfortunately she is not oriented to place or time and is unable to provide any additional history. She is currently on  3 L O2. BP stable. WBC 6. Blood cx pending. CTA concerning for left sided pnuemonia, Started on vancomycin and zosyn. BNP is 7535. Lactate 1.0. ID consulted for assistance with antibiotics. Currently resting comfortably. RN has not appreciated any cough. Patient complains of chronic chest pain.     9/5: Afebrile overnight.  Overall clinically stable.  She is on 5 L of oxygen.  Net negative about 800 mL's.  Tolerating Zosyn.  She complains of mild cough but no chest pain.  She is hungry and would like to eat breakfast    9/6: palliative consulted, planning to transition to hospice care at [facility]. Stable clinically, fatigue, afebrile. Mild wet sounding cough on 4 L O2    Review of Systems  She is not oriented to place or time.  No chest pain. Otherwise ROS limited based on memory loss    No Known Allergies    Antibiotics:  Anti-Infectives (From admission, onward)    Ordered     Dose/Rate Route Frequency Start Stop    09/04/19 2005  piperacillin-tazobactam (ZOSYN) 3.375 g in iso-osmotic dextrose 50 ml (premix)     Ordering Provider:  Adela Gomez PA    3.375 g  over 4 Hours Intravenous Every 8 Hours 09/05/19 0000 09/09/19 0759    09/04/19 0522  vancomycin 1250 mg/250 mL 0.9% NS IVPB ([facility])     Ordering Provider:  Dung Valdez MD    20 mg/kg × 60.5 kg Intravenous Once 09/04/19 0524 09/04/19 0950    09/04/19 0522  piperacillin-tazobactam (ZOSYN) 3.375 g in iso-osmotic dextrose 50 ml (premix)     Ordering Provider:  Dung Valdez MD    3.375 g Intravenous Once 09/04/19 0524 09/04/19 0552          Other Medications:  Current Facility-Administered Medications   Medication Dose Route Frequency Provider Last Rate Last Dose   • acetaminophen (TYLENOL) tablet 650 mg  650 mg Oral Q6H PRN Beverly Glasgow MD   650 mg at 09/06/19 0120   • carvedilol (COREG) tablet 12.5 mg  12.5 mg Oral BID With Meals Beverly Glasgow MD   12.5 mg at 09/06/19 0909   • diclofenac (VOLTAREN) 1 % gel 4 g  4 g Topical BID PRN Pee,  "Beverly GUZMAN MD       • donepezil (ARICEPT) tablet 5 mg  5 mg Oral Nightly Beverly Glasgow MD   5 mg at 09/05/19 2004   • ferrous sulfate tablet 325 mg  325 mg Oral Daily With Breakfast Beverly Glasgow MD   325 mg at 09/06/19 0909   • folic acid (FOLVITE) tablet 1 mg  1 mg Oral Daily Beverly Glasgow MD   1 mg at 09/06/19 0910   • furosemide (LASIX) injection 20 mg  20 mg Intravenous Q12H Beverly Glasgow MD   20 mg at 09/06/19 0505   • ipratropium-albuterol (DUO-NEB) nebulizer solution 3 mL  3 mL Nebulization Q4H PRN Adela Gomez PA   3 mL at 09/05/19 0038   • ipratropium-albuterol (DUO-NEB) nebulizer solution 3 mL  3 mL Nebulization 4x Daily - RT Beverly Glasgow MD   3 mL at 09/06/19 0924   • levothyroxine (SYNTHROID, LEVOTHROID) tablet 50 mcg  50 mcg Oral Q AM Beverly Glasgow MD   50 mcg at 09/06/19 0506   • lisinopril (PRINIVIL,ZESTRIL) tablet 2.5 mg  2.5 mg Oral Daily Beverly Glasgow MD   2.5 mg at 09/06/19 0910   • mirtazapine (REMERON) tablet 15 mg  15 mg Oral Daily Beverly Glasgow MD   15 mg at 09/06/19 0910   • Pharmacy Consult - MTM   Does not apply Daily [facility]   Stopped at 09/04/19 1514   • piperacillin-tazobactam (ZOSYN) 3.375 g in iso-osmotic dextrose 50 ml (premix)  3.375 g Intravenous Q8H Adela Gomez PA   3.375 g at 09/06/19 0909   • sertraline (ZOLOFT) tablet 50 mg  50 mg Oral Daily Beverly Glasgow MD   50 mg at 09/06/19 0910   • sodium chloride 0.9 % flush 10 mL  10 mL Intravenous PRN Dung Valdez MD   10 mL at 09/04/19 0951       Physical Exam:   Vital Signs   /46   Pulse 80   Temp 97.5 °F (36.4 °C) (Oral)   Resp 18   Ht 162.6 cm (64.02\")   Wt 60.5 kg (133 lb 6.1 oz)   LMP  (LMP Unknown)   SpO2 95%   BMI 22.88 kg/m²      GENERAL: fatigue but easily arousible, but not oriented.   no acute distress.   HEENT: Normocephalic, atraumatic.  PERRL. EOMI. No conjunctival injection. No icterus.   HEART: RRR; No murmur.  LUNGS: Lungs sounds diminished at " right base. No rales. Wet sounding cough appreciated.    ABDOMEN: Soft, nontender, nondistended. Positive bowel sounds. No rebound or guarding.    : no gramajo  EXT:  Chronic joint deformities on both hands, but no cyanosis, clubbing or significant edema.  MSK: FROM without joint effusions noted arms/legs.    SKIN: Warm and dry without cutaneous eruptions on Inspection/palpation.    NEURO: Oriented to name but not place or time. No focal deficits on motor/sensory exam at arms/legs.  PSYCHIATRIC: dementia, memory loss    Laboratory Data    Results from last 7 days   Lab Units 09/06/19  0712 09/05/19  0424 09/04/19  0405 09/04/19  0357   WBC 10*3/mm3 4.82 5.21  --  6.12   HEMOGLOBIN g/dL 7.7* 8.7*  --  7.8*   HEMOGLOBIN, POC g/dL  --   --  7.1*  --    HEMATOCRIT % 26.0* 28.7*  --  27.3*   HEMATOCRIT POC %  --   --  21*  --    PLATELETS 10*3/mm3 154 172  --  181     Results from last 7 days   Lab Units 09/06/19  0712   SODIUM mmol/L 140   POTASSIUM mmol/L 4.0   CHLORIDE mmol/L 101   CO2 mmol/L 27.0   BUN mg/dL 41*   CREATININE mg/dL 1.39*   GLUCOSE mg/dL 87   CALCIUM mg/dL 6.9*     Estimated Creatinine Clearance: 22.6 mL/min (A) (by C-G formula based on SCr of 1.39 mg/dL (H)).  Results from last 7 days   Lab Units 09/04/19  0357   ALK PHOS U/L 84  84   BILIRUBIN mg/dL 0.3  0.3   BILIRUBIN DIRECT mg/dL <0.2*   ALT (SGPT) U/L <5  <5   AST (SGOT) U/L 16  16               Microbiology:    9/4 blood cx pending, NGTD   MRSA screen negative  sputum cx not collected    Radiology:  Xr Chest 1 View    Result Date: 9/5/2019  1. Mild improvement on comparison earlier film. Continued follow-up recommended. Signer Name: Rosario Sullivan MD  Signed: 9/5/2019 12:25 AM  Workstation Name: [email or web address]  [facility]    Xr Chest 1 View    Result Date: 9/4/2019  Globular cardiomediastinal silhouette enlargement with severe left greater the right alveolar disease. Left-sided pacer defibrillator. Signer Name: Rosario  Nate GONZALEZ  Signed: 9/4/2019 5:15 AM  Workstation Name: [facility]  [facility]    Ct Angiogram Chest With Contrast    Result Date: 9/4/2019  No evidence for pulmonary most. 2. Globular cardiac silhouette enlargement. 3. Bilateral pleural effusions. 4. Extensive alveolar disease asymmetrically worse on the left than the right. In addition to the airspace disease there are multiple bilateral nodular lesions in the lungs. This could be infectious or inflammatory in etiology but metastatic disease is also in the differential. 5. Partially calcified subcarinal lymph node. 6. No pneumothorax 7. Concern for left adrenal mass partly in the field-of-view. Signer Name: Rosario Sullivan MD  Signed: 9/4/2019 5:14 AM  Workstation Name: [facility]  [facility]     I personally reviewed the above imaging studies.      Impression:   1. Acute on chronic hypoxic respiratory failure: likely close to baseline, currently comfortable on 4 L.   2. Consolidation of LLL and DEJUAN on CT concerning for pneumonia:   3. Right pleural effusion  4. Acute on chronic systolic heart failure  5. Idiopathic cardiomyopathy (last EF 20%)  6. Dementia  7. Pacemaker, ICD  8. CKD stage 4  9. Hx of DVT  10. Hypothyroid    Palliative care consulted: planning to transition to hospice care at SNF which is appropriate. Likely symptoms mostly due to CHF with possible HCAP. Continue antibiotics until discharge, then stop.     PLAN:    - f/u pending blood cx, so far negative    - continue empiric zosyn, renally dosed, until discharge to hospice care, then stop    - diuresis and other supportive care per primary team    Call if any other acute concerns prior to discharge    Didier Del Real MD  9/6/2019      Electronically signed by Didier Del Real MD at 9/6/2019 10:47 AM     Beverly Glasgow MD at 9/5/2019  1:59 PM              [facility] Medicine Services  PROGRESS NOTE    Patient Name: Imelda  BRUCE Maldonado  : 1923  MRN: [id number]    Date of Admission: 2019  Length of Stay: 1  Primary Care Physician: Leyla Castro DO    Subjective   Subjective     CC:  F/U SOA, hypoxia    HPI:  Patient seen this morning. No complaints, says she feels better. Slept well last night.     Review of Systems  CV-no chest pain, no palpitations  Resp-no cough, no dyspnea  GI-no N/V/D, no abd pain    All other systems reviewed and negative except any additional pertinent positives and negatives as discussed in HPI.    Objective   Objective     Vital Signs:   Temp:  [97 °F (36.1 °C)-98.1 °F (36.7 °C)] 97.9 °F (36.6 °C)  Heart Rate:  [74-81] 74  Resp:  [17-24] 18  BP: ()/(46-59) 98/59        Physical Exam:  Gen-no acute distress, frail appearance  HENT-NCAT, mucous membranes moist  CV-RRR, S1 S2 normal, no m/r/g  Resp-coarse breath sounds bilaterally, no wheezes, nonlabored on HFNC  Abd-soft, NT, ND, +BS  Ext-no edema  Neuro-A&Ox1, answers simple questions, no focal deficits  Skin-no rashes  Psych-appropriate mood    Results Reviewed:    Results from last 7 days   Lab Units 19  1248 19  0356   WBC 10*3/mm3 5.21  --   --  6.12  --    HEMOGLOBIN g/dL 8.7*  --   --  7.8*  --    HEMOGLOBIN, POC g/dL  --   --  7.1*  --   --    HEMATOCRIT % 28.7*  --   --  27.3*  --    HEMATOCRIT POC %  --   --  21*  --   --    PLATELETS 10*3/mm3 172  --   --  181  --    INR   --   --   --   --  1.27*   PROCALCITONIN ng/mL  --  0.15  --   --   --      Results from last 7 days   Lab Units 19  0639 19   SODIUM mmol/L 141  --   --  142   POTASSIUM mmol/L 4.6  --   --  4.9   CHLORIDE mmol/L 104  --   --  106   CO2 mmol/L 24.0  --   --  25.0   BUN mg/dL 40*  --   --  33*   CREATININE mg/dL 1.28*  --  1.30 1.06*   GLUCOSE mg/dL 70  --   --  91   CALCIUM mg/dL 7.0*  --   --  7.4*   ALT (SGPT) U/L  --   --   --  <5  <5   AST (SGOT) U/L   --   --   --  16  16   TROPONIN T ng/mL  --  0.024  --  0.033*   PROBNP pg/mL  --   --   --  7,535.0*     Estimated Creatinine Clearance: 24.6 mL/min (A) (by C-G formula based on SCr of 1.28 mg/dL (H)).    Microbiology Results Abnormal     Procedure Component Value - Date/Time    Blood Culture - Blood, Arm, Left [[id number]] Collected:  09/04/19 0530    Lab Status:  Preliminary result Specimen:  Blood from Arm, Left Updated:  09/05/19 0645     Blood Culture No growth at 24 hours    Blood Culture - Blood, Arm, Right [[id number]] Collected:  09/04/19 0500    Lab Status:  Preliminary result Specimen:  Blood from Arm, Right Updated:  09/05/19 0645     Blood Culture No growth at 24 hours    MRSA Screen, PCR - Swab, Nares [[id number]]  (Normal) Collected:  09/04/19 1800    Lab Status:  Final result Specimen:  Swab from Nares Updated:  09/1934     MRSA PCR Negative    Narrative:       MRSA Negative          Imaging Results (last 24 hours)     Procedure Component Value Units Date/Time    XR Chest 1 View [[id number]] Collected:  09/05/19 0025     Updated:  09/05/19 0027    Narrative:       Chest x-ray portable    Suspected fluid overload. History of hypertension and CHF.    Single frontal portable view of the chest timed 1150 on 9/4/2019 and compared to 4:35 AM same day.    FINDINGS: Globular cardiac silhouette enlargement with left-sided pacer again seen. Continued interstitial edema and airspace disease predominantly centrally and asymmetrically worse on the left with left pleural fluid not excluded. On comparison to  prior, mild improvement in airspace disease and volume status. No pneumothorax.      Impression:       1. Mild improvement on comparison earlier film. Continued follow-up recommended.    Signer Name: Rosario Sullivan MD   Signed: 9/5/2019 12:25 AM   Workstation Name: [id number]    [facility]          Results for orders placed during the hospital encounter of 08/19/19   Adult  Transthoracic Echo Complete With Contrast if Necessary Per Protocol    Narrative · The left ventricular cavity is moderate-to-severely dilated.  · Left atrial cavity size is moderate-to-severely dilated.  · Moderate aortic valve regurgitation is present.  · Moderate-to-severe mitral valve regurgitation is present.  · Mild tricuspid valve regurgitation is present.  · Mild pulmonic valve regurgitation is present.  · Mild dilation of the aortic root and of the sinuses Valsalva present.  · Left ventricular wall thickness is consistent with mild concentric   hypertrophy.  · Estimated EF = 24%.  · Left ventricular systolic function is severely decreased.  · The following left ventricular wall segments are hypokinetic: basal   anterolateral, basal inferolateral, mid inferolateral, apical inferior,   mid inferior, basal inferoseptal, basal anterior, basal inferior and basal   inferoseptal. The following left ventricular wall segments are akinetic:   mid anterior, apical anterior, mid anterolateral, apical lateral, apical   septal, mid inferoseptal, apex and mid anteroseptal.  · The findings are consistent with dilated cardiomyopathy.  · Normal right ventricular cavity size and wall thickness with moderately   reduced right ventricular systolic function noted; abnormal septal motion   consistent with RV pacemaker.  · The aortic valve exhibits mild sclerosis.  · No evidence of pulmonary hypertension is present.  · There is a trivial pericardial effusion adjacent to the left ventricle.          I have reviewed the medications:  Scheduled Meds:  carvedilol 12.5 mg Oral BID With Meals   donepezil 5 mg Oral Nightly   ferrous sulfate 325 mg Oral Daily With Breakfast   folic acid 1 mg Oral Daily   furosemide 20 mg Intravenous Q12H   ipratropium-albuterol 3 mL Nebulization 4x Daily - RT   levothyroxine 50 mcg Oral Q AM   lisinopril 2.5 mg Oral Daily   mirtazapine 15 mg Oral Daily   pharmacy consult - MTM  Does not apply Daily    piperacillin-tazobactam 3.375 g Intravenous Q8H   sertraline 50 mg Oral Daily     Continuous Infusions:   PRN Meds:.•  acetaminophen  •  diclofenac  •  ipratropium-albuterol  •  sodium chloride      Assessment/Plan   Assessment / Plan     Active Hospital Problems    Diagnosis  POA   • **Acute on chronic congestive heart failure (CMS/HCC) [I50.9]  Yes   • Acute on chronic anemia [D64.9]  Yes   • HCAP (healthcare-associated pneumonia) [J18.9]  Yes   • Hypothyroidism (acquired) [E03.9]  Yes   • Dementia [F03.90]  Yes   • Elevated troponin [R74.8]  Yes   • Pacemaker [Z95.0]  Yes   • Acute and chronic respiratory failure with hypoxia (CMS/HCC) [J96.21]  Yes   • CKD (chronic kidney disease) stage 3, GFR 30-59 ml/min (CMS/HCC) [N18.3]  Yes   • Idiopathic cardiomyopathy (CMS/HCC) [I42.8]  Yes   • Hyperlipidemia [E78.5]  Yes   • Essential hypertension [I10]  Yes      Resolved Hospital Problems   No resolved problems to display.        Brief Hospital Course to date:  Imelda Maldonado is a 89 y.o. female with hx of dementia, CAD, chronic systolic CHF (EF 25%) s/p BiV-ICD, HTN, HLD, remote DVT, GI bleed in 2013, hx of C.diff, and Afib (not anticoagulated due to hx of GI bleed) who presents due to SOA and hypoxia. Recent hospitalization 8/19-8/21 due to CHF exacerbation. CTA chest in the ER showed no PE but moderate bilateral pleural effusions, alveolar disease worse on the left with near complete consolidation of the left lower lobe. proBNP elevated at 7535 with mild elevation in troponin 0.033.  Admitted for treatment of CHF and HCAP.     PLAN:  --Continue Zosyn for now. ID following. Blood cultures NGTD.  --Appreciate Cardiology seeing patient. Dr. Cardozo has nothing else to add. Recommends hospice for end stage heart failure.  --Anemia improved s/p 1 unit PRBC on admission. No signs of acute blood loss. Monitor.   --Discussed with daughter Earlene by phone (lives [place] of [place]). Agreeable to Palliative and Hospice consults. Will  continue with ATBx and IV Lasix in the mean time, attempt to wean oxygen.     DVT Prophylaxis:  mechanical    Disposition: I expect the patient to be discharged TBD[facility]?    CODE STATUS:   Code Status and Medical Interventions:   Ordered at: 09/04/19 0657     Code Status:    No CPR     Medical Interventions (Level of Support Prior to Arrest):    Comfort Measures     Comments:    per advanced directive on chart         Electronically signed by Beverly Glasgow MD, 09/05/19, 2:07 PM.        Electronically signed by Beverly Glasgow MD at 9/5/2019  2:07 PM     Didier Del Real MD at 9/5/2019 10:35 AM          Infectious Disease Progress Note  Imelda Maldonado  4/29/1923  [id number]    Date of Consult: 9/4/2019  Admission Date: 9/4/2019    Requesting Provider: Beverly GUZMAN MD  Evaluating Physician: Didier Del Real MD    Chief Complaint: shortness of breath, cough    Reason for Consultation: possible pneumonia    History of present illness:   Patient is a 89 y.o.  Yr old female with history of idiopathic cardiomyopathy (EF 20%), osteoarthritis, CKD, DVT, gout, pacemaker/ICD, dementia. Recent admission from 8/19 to 8/21 with heart failure, tx with diuresis.    Presented back to [facility] ED on 9/4 from SNF due to concerns of of worsening shortness of breath and productive cough. Afebrile.  Unfortunately she is not oriented to place or time and is unable to provide any additional history. She is currently on 3 L O2. BP stable. WBC 6. Blood cx pending. CTA concerning for left sided pnuemonia, Started on vancomycin and zosyn. BNP is 7535. Lactate 1.0. ID consulted for assistance with antibiotics. Currently resting comfortably. RN has not appreciated any cough. Patient complains of chronic chest pain.     9/5: Afebrile overnight.  Overall clinically stable.  She is on 5 L of oxygen.  Net negative about 800 mL's.  Tolerating Zosyn.  She complains of mild cough but no chest pain.   She is hungry and would like to eat breakfast    Review of Systems  She is not oriented to place or time.  She denies having any further pain.  Otherwise ROS limited based on memory loss    No Known Allergies    Antibiotics:  Anti-Infectives (From admission, onward)    Ordered     Dose/Rate Route Frequency Start Stop    09/04/19 2005  piperacillin-tazobactam (ZOSYN) 3.375 g in iso-osmotic dextrose 50 ml (premix)     Ordering Provider:  Adela Gomez PA    3.375 g  over 4 Hours Intravenous Every 8 Hours 09/05/19 0000 09/09/19 0759    09/04/19 0522  vancomycin 1250 mg/250 mL 0.9% NS IVPB ([facility])     Ordering Provider:  Dung Valdez MD    20 mg/kg × 60.5 kg Intravenous Once 09/04/19 0524 09/04/19 0950    09/04/19 0522  piperacillin-tazobactam (ZOSYN) 3.375 g in iso-osmotic dextrose 50 ml (premix)     Ordering Provider:  Dung Valdez MD    3.375 g Intravenous Once 09/04/19 0524 09/04/19 0552          Other Medications:  Current Facility-Administered Medications   Medication Dose Route Frequency Provider Last Rate Last Dose   • acetaminophen (TYLENOL) tablet 650 mg  650 mg Oral Q6H PRN Beverly Glasgow MD   650 mg at 09/04/19 1758   • carvedilol (COREG) tablet 12.5 mg  12.5 mg Oral BID With Meals Beverly Glasgow MD   12.5 mg at 09/05/19 0852   • diclofenac (VOLTAREN) 1 % gel 4 g  4 g Topical BID PRN Beverly Glasgow MD       • donepezil (ARICEPT) tablet 5 mg  5 mg Oral Nightly Beverly Glasgow MD   5 mg at 09/04/19 2124   • ferrous sulfate tablet 325 mg  325 mg Oral Daily With Breakfast Beverly Glasgow MD   325 mg at 09/05/19 0852   • folic acid (FOLVITE) tablet 1 mg  1 mg Oral Daily Beverly Glasgow MD   1 mg at 09/05/19 0852   • ipratropium-albuterol (DUO-NEB) nebulizer solution 3 mL  3 mL Nebulization Q4H PRN Adela Gomez PA   3 mL at 09/05/19 0038   • ipratropium-albuterol (DUO-NEB) nebulizer solution 3 mL  3 mL Nebulization 4x Daily - RT Beverly Glasgow MD       • levothyroxine (SYNTHROID,  "LEVOTHROID) tablet 50 mcg  50 mcg Oral Q AM Beverly Glasgow MD   50 mcg at 09/05/19 0522   • lisinopril (PRINIVIL,ZESTRIL) tablet 2.5 mg  2.5 mg Oral Daily Beverly Glasgow MD   2.5 mg at 09/05/19 0852   • mirtazapine (REMERON) tablet 15 mg  15 mg Oral Daily Beverly Glasgow MD   15 mg at 09/05/19 0851   • Pharmacy Consult - MTM   Does not apply Daily [facility] La [facility]   Stopped at 09/04/19 1514   • piperacillin-tazobactam (ZOSYN) 3.375 g in iso-osmotic dextrose 50 ml (premix)  3.375 g Intravenous Q8H Adela Gomez PA   3.375 g at 09/05/19 0900   • sertraline (ZOLOFT) tablet 50 mg  50 mg Oral Daily Beverly Glasgow MD   50 mg at 09/05/19 0852   • sodium chloride 0.9 % flush 10 mL  10 mL Intravenous PRN Dung Valdez MD   10 mL at 09/04/19 0951       Physical Exam:   Vital Signs   /48 (BP Location: Left arm, Patient Position: Lying)   Pulse 76   Temp 97.4 °F (36.3 °C) (Oral)   Resp 18   Ht 162.6 cm (64.02\")   Wt 60.5 kg (133 lb 6.1 oz)   LMP  (LMP Unknown)   SpO2 (!) 89%   BMI 22.88 kg/m²      GENERAL: Awake and alert, but not oriented. Elderly but in no acute distress.   HEENT: Normocephalic, atraumatic.  PERRL. EOMI. No conjunctival injection. No icterus.   NECK: Supple without nuchal rigidity. Mild JVD appreciated  HEART: RRR; No murmur.  LUNGS: Rales throughout the left lung fields, but improved  Diminished at right base.  Dry cough appreciated  ABDOMEN: Soft, nontender, nondistended. Positive bowel sounds. No rebound or guarding. No CVA tenderness  : no gramajo  EXT:  Chronic joint deformities on both hands, but no cyanosis, clubbing or significant edema.  MSK: FROM without joint effusions noted arms/legs.    SKIN: Warm and dry without cutaneous eruptions on Inspection/palpation.    NEURO: Oriented to name but not place or time. No focal deficits on motor/sensory exam at arms/legs.  PSYCHIATRIC: dementia, memory loss    Laboratory Data    Results from last 7 days   Lab Units " 09/05/19  0424 09/04/19  0405 09/04/19  0357   WBC 10*3/mm3 5.21  --  6.12   HEMOGLOBIN g/dL 8.7*  --  7.8*   HEMOGLOBIN, POC g/dL  --  7.1*  --    HEMATOCRIT % 28.7*  --  27.3*   HEMATOCRIT POC %  --  21*  --    PLATELETS 10*3/mm3 172  --  181     Results from last 7 days   Lab Units 09/05/19  0425   SODIUM mmol/L 141   POTASSIUM mmol/L 4.6   CHLORIDE mmol/L 104   CO2 mmol/L 24.0   BUN mg/dL 40*   CREATININE mg/dL 1.28*   GLUCOSE mg/dL 70   CALCIUM mg/dL 7.0*     Estimated Creatinine Clearance: 24.6 mL/min (A) (by C-G formula based on SCr of 1.28 mg/dL (H)).  Results from last 7 days   Lab Units 09/04/19  0357   ALK PHOS U/L 84  84   BILIRUBIN mg/dL 0.3  0.3   BILIRUBIN DIRECT mg/dL <0.2*   ALT (SGPT) U/L <5  <5   AST (SGOT) U/L 16  16               Microbiology:    9/4 blood cx pending, NGTD   MRSA screen negative  sputum cx not collected    Radiology:  Xr Chest 1 View    Result Date: 9/5/2019  1. Mild improvement on comparison earlier film. Continued follow-up recommended. Signer Name: Rosario Sullivan MD  Signed: 9/5/2019 12:25 AM  Workstation Name: [facility]    Xr Chest 1 View    Result Date: 9/4/2019  Globular cardiomediastinal silhouette enlargement with severe left greater the right alveolar disease. Left-sided pacer defibrillator. Signer Name: Rosario Sullivan MD  Signed: 9/4/2019 5:15 AM  Workstation Name: [facility]  Radiology [facility]    Ct Angiogram Chest With Contrast    Result Date: 9/4/2019  No evidence for pulmonary most. 2. Globular cardiac silhouette enlargement. 3. Bilateral pleural effusions. 4. Extensive alveolar disease asymmetrically worse on the left than the right. In addition to the airspace disease there are multiple bilateral nodular lesions in the lungs. This could be infectious or inflammatory in etiology but metastatic disease is also in the differential. 5. Partially calcified subcarinal lymph node. 6. No pneumothorax 7. Concern for  left adrenal mass partly in the field-of-view. Signer Name: Rosaroi Sullivan MD  Signed: 9/4/2019 5:14 AM  Workstation Name: [email or web address]  [facility]     I personally reviewed the above imaging studies.      Impression:   11. Acute on chronic hypoxic respiratory failure: unknown baseline, currently comfortable on 5 L.   12. Consolidation of LLL and DEJUAN on CT concerning for pneumonia:   13. Right pleural effusion  14. Acute on chronic systolic heart failure  15. Idiopathic cardiomyopathy (last EF 20%)  16. Dementia  17. Pacemaker, ICD  18. CKD stage 4  19. Hx of DVT  20. Hypothyroid    At this time is unclear to me how much worse than her baseline she truly is. Comfortable on 4-5 L. Exam somewhat improved today after starting diuresis and antibiotics.  Mild dry cough.  Dr. Cardozo evaluated and recommends palliative care consult based on end-stage heart failure.  Due to extensive findings on imaging concerning for pneumonia we will continue empiric Zosyn for now, however she is nontoxic-appearing and has normal WBC, lactic acid, and procalcitonin suggesting against infection, although she is certainly high risk for healthcare associated pneumonia given her recent admissions and residence in a skilled nursing facility. Also high risk for aspiration. She likely has combination of both pneumonia and acute heart failure and will treat with empiric antibiotics as well as diuresis.    PLAN: Thank you for asking us to see Imelda Maldonado, I recommend the following:   - f/u pending blood cx, so far negative  - sputum culture if cough becomes productive  - trend CBC, BMP    - off vancomycin  - continue empiric zosyn, renally dosed, pending clinical improvement    - diuresis and other supportive care per primary team  - agree that palliative care consult is warranted    Discussed with Dr Glasgow. I will follow. Call with questions.     Didier Del Real MD  9/5/2019      Electronically signed by Gt  Didier DUARTE MD at 9/5/2019 10:41 AM          Consult Notes (last 72 hours) (Notes from 9/3/2019 11:49 AM through 9/6/2019 11:49 AM)      Chuy Cavazos DO at 9/5/2019  2:53 PM      Consult Orders    1. Inpatient Palliative Care MD Consult [[id number]] ordered by Beveryl Glasgow MD at 09/05/19 1359                Leyla Castro DO  Consulting physician: Pee    Chief Complaint   Patient presents with   • Shortness of Breath         HPI: Patient is a 89-year-old female brought in hospital due to dyspnea.  Patient has been found to have end-stage cardiac disease.  Patient does have a history of dementia and apparently lives in a nursing facility.  Patient himself is a poor historian so most of the HPI comes from chart review.      Past Medical History:   Diagnosis Date   • Acute GI bleeding     12/09/2013 - 12/24/2013. Status post 7 units of packed red blood cells. Esophagogastroduodenoscopy (no active bleed) and colonoscopy (active bleeding from flat cecal polyps and two ascending colonic polyps) suggestive of non-steroidal anti-inflammatory drug colopathy or mild ischemia. Positive for Clostridium difficile status post 14 days of antibiotics.     • Arthritis     reinitiation of celebrex, October 2013   • CRI (chronic renal insufficiency)     mild   • DVT (deep venous thrombosis) (CMS/HCC)     RLE   • Gout    • Histoplasmosis    • Hyperlipidemia    • Hypertension    • Hypotension     symptoms of dizziness   • Idiopathic cardiomyopathy (CMS/HCC)      Past Surgical History:   Procedure Laterality Date   • APPENDECTOMY     • HYSTERECTOMY     • PACEMAKER IMPLANTATION      change out on 10/30/15 without complication   • POLYPECTOMY     • THYROIDECTOMY      chronic replacement therapy     Current Code Status     Date Active Code Status Order ID Comments User Context       9/4/2019 06:57 No CPR [id number]  Adela Gomez PA Inpatient       Questions for Current Code Status     Question Answer Comment    Code Status  No CPR     Medical Interventions (Level of Support Prior to Arrest) Comfort Measures     Comments per advanced directive on chart         Current Facility-Administered Medications   Medication Dose Route Frequency Provider Last Rate Last Dose   • acetaminophen (TYLENOL) tablet 650 mg  650 mg Oral Q6H PRN Beverly Glasgow MD   650 mg at 09/04/19 1758   • carvedilol (COREG) tablet 12.5 mg  12.5 mg Oral BID With Meals Beverly Glasgow MD   12.5 mg at 09/05/19 0852   • diclofenac (VOLTAREN) 1 % gel 4 g  4 g Topical BID PRN Beverly Glasgow MD       • donepezil (ARICEPT) tablet 5 mg  5 mg Oral Nightly Beverly Glasgow MD   5 mg at 09/04/19 2124   • ferrous sulfate tablet 325 mg  325 mg Oral Daily With Breakfast Beverly Glasgow MD   325 mg at 09/05/19 0852   • folic acid (FOLVITE) tablet 1 mg  1 mg Oral Daily Beverly Glasgow MD   1 mg at 09/05/19 0852   • furosemide (LASIX) injection 20 mg  20 mg Intravenous Q12H Beverly Glasgow MD       • ipratropium-albuterol (DUO-NEB) nebulizer solution 3 mL  3 mL Nebulization Q4H PRN Adela Gomez PA   3 mL at 09/05/19 0038   • ipratropium-albuterol (DUO-NEB) nebulizer solution 3 mL  3 mL Nebulization 4x Daily - RT Beverly Glasgow MD   3 mL at 09/05/19 1200   • levothyroxine (SYNTHROID, LEVOTHROID) tablet 50 mcg  50 mcg Oral Q AM Beverly Glasgow MD   50 mcg at 09/05/19 0522   • lisinopril (PRINIVIL,ZESTRIL) tablet 2.5 mg  2.5 mg Oral Daily Beverly Glasgow MD   2.5 mg at 09/05/19 0852   • mirtazapine (REMERON) tablet 15 mg  15 mg Oral Daily Beverly Glasgow MD   15 mg at 09/05/19 0851   • Pharmacy Consult - MTM   Does not apply Daily Scott De La Rosa [facility]   Stopped at 09/04/19 1514   • piperacillin-tazobactam (ZOSYN) 3.375 g in iso-osmotic dextrose 50 ml (premix)  3.375 g Intravenous Q8H Adela Gomez PA   3.375 g at 09/05/19 0900   • sertraline (ZOLOFT) tablet 50 mg  50 mg Oral Daily Beverly Glasgow MD   50 mg at 09/05/19 0852   • sodium chloride 0.9 % flush 10 mL  10  "mL Intravenous PRN Dung Valdez MD   10 mL at 09/04/19 0951        •  acetaminophen  •  diclofenac  •  ipratropium-albuterol  •  sodium chloride  No Known Allergies  Family History   Problem Relation Age of Onset   • Arthritis Other    • Diabetes Other    • Heart disease Other    • Hypertension Other    • Thyroid disease Other      Social History     Socioeconomic History   • Marital status:      Spouse name: Not on file   • Number of children: Not on file   • Years of education: Not on file   • Highest education level: Not on file   Tobacco Use   • Smoking status: Former Smoker     Types: Cigarettes   • Smokeless tobacco: Never Used   Substance and Sexual Activity   • Drug use: No   • Sexual activity: Defer     Review of Systems -all others reviewed and are negative except mentioned in the HPI      BP 98/59 (BP Location: Right arm, Patient Position: Lying)   Pulse 74   Temp 97.9 °F (36.6 °C) (Oral)   Resp 18   Ht 162.6 cm (64.02\")   Wt 60.5 kg (133 lb 6.1 oz)   LMP  (LMP Unknown)   SpO2 97%   BMI 22.88 kg/m²      Intake/Output Summary (Last 24 hours) at 9/5/2019 1453  Last data filed at 9/5/2019 0900  Gross per 24 hour   Intake 489 ml   Output 1650 ml   Net -1161 ml     Physical Exam:      General Appearance:    Alert, cooperative, in no acute distress   Head:    Normocephalic, without obvious abnormality, atraumatic   Eyes:            Lids and lashes normal, conjunctivae and sclerae normal, no   icterus, no pallor, corneas clear, PERRLA   Ears:    Ears appear intact with no abnormalities noted   Throat:   No oral lesions, no thrush, oral mucosa moist   Neck:   No adenopathy, supple, trachea midline, no thyromegaly, no     carotid bruit, no JVD   Back:     No kyphosis present, no scoliosis present, no skin lesions,       erythema or scars, no tenderness to percussion or                   palpation,   range of motion normal   Lungs:     Clear to auscultation,respirations regular, even and      "              unlabored    Heart:    Regular rhythm and normal rate, normal S1 and S2, no            murmur, no gallop, no rub, no click   Breast Exam:    Deferred   Abdomen:     Normal bowel sounds, no masses, no organomegaly, soft        non-tender, non-distended, no guarding, no rebound                 tenderness   Genitalia:    Deferred   Extremities:   Moves all extremities well, no edema, no cyanosis, no              redness   Pulses:   Pulses palpable and equal bilaterally   Skin:   No bleeding, bruising or rash   Lymph nodes:   No palpable adenopathy   Neurologic:   Cranial nerves 2 - 12 grossly intact, sensation intact, DTR        present and equal bilaterally       Results from last 7 days   Lab Units 09/05/19  0424   WBC 10*3/mm3 5.21   HEMOGLOBIN g/dL 8.7*   HEMATOCRIT % 28.7*   PLATELETS 10*3/mm3 172     Results from last 7 days   Lab Units 09/05/19 0425 09/04/19  0357   SODIUM mmol/L 141  --  142   POTASSIUM mmol/L 4.6  --  4.9   CHLORIDE mmol/L 104  --  106   CO2 mmol/L 24.0  --  25.0   BUN mg/dL 40*  --  33*   CREATININE mg/dL 1.28*   < > 1.06*   CALCIUM mg/dL 7.0*  --  7.4*   BILIRUBIN mg/dL  --   --  0.3  0.3   ALK PHOS U/L  --   --  84  84   ALT (SGPT) U/L  --   --  <5  <5   AST (SGOT) U/L  --   --  16  16   GLUCOSE mg/dL 70  --  91    < > = values in this interval not displayed.     Results from last 7 days   Lab Units 09/05/19 0425   SODIUM mmol/L 141   POTASSIUM mmol/L 4.6   CHLORIDE mmol/L 104   CO2 mmol/L 24.0   BUN mg/dL 40*   CREATININE mg/dL 1.28*   GLUCOSE mg/dL 70   CALCIUM mg/dL 7.0*     Imaging Results (last 72 hours)     Procedure Component Value Units Date/Time    XR Chest 1 View [[id number]] Collected:  09/05/19 0025     Updated:  09/05/19 0027    Narrative:       Chest x-ray portable    Suspected fluid overload. History of hypertension and CHF.    Single frontal portable view of the chest timed 1150 on 9/4/2019 and compared to 4:35 AM same day.    FINDINGS: Globular cardiac  silhouette enlargement with left-sided pacer again seen. Continued interstitial edema and airspace disease predominantly centrally and asymmetrically worse on the left with left pleural fluid not excluded. On comparison to  prior, mild improvement in airspace disease and volume status. No pneumothorax.      Impression:       1. Mild improvement on comparison earlier film. Continued follow-up recommended.    Signer Name: Rosario Sullivan MD   Signed: 9/5/2019 12:25 AM   Workstation Name: [facility]    Radiology [facility]    XR Chest 1 View [[id number]] Collected:  09/04/19 0515     Updated:  09/04/19 0517    Narrative:       CR Chest 1 Vw    INDICATION:   Short of air and hypoxia     COMPARISON:    Chest x-ray 8/19/2019.    FINDINGS:  Single portable AP view(s) of the chest.  Globular cardiomediastinal silhouette enlargement and left-sided pacer/fibrillator again seen. Severe alveolar disease left greater than right. See accompanying CT chest dictation. No pneumothorax.      Impression:       Globular cardiomediastinal silhouette enlargement with severe left greater the right alveolar disease. Left-sided pacer defibrillator.    Signer Name: Rosario Sullivan MD   Signed: 9/4/2019 5:15 AM   Workstation Name: [facility]    CT Angiogram Chest With Contrast [[id number]] Collected:  09/04/19 0514     Updated:  09/04/19 0516    Narrative:       INDICATION:   Hypoxic, short of air, recent fracture right arm.    TECHNIQUE:   CT angiogram of the chest with contrast. 3-D reformatted images were acquired.  Radiation dose reduction techniques included automated exposure control or exposure modulation based on body size. Radiation audit for number of CT and nuclear cardiology  exams performed in the last year:  0. Dose recorded in the patient's chart.    COMPARISON:   Chest x-ray from 8/19/2019.    FINDINGS:   There is no evidence for pulmonary embolism. There is globular cardiac  silhouette enlargement with small to moderate pericardial effusion. There is moderate right-sided pleural effusion. There is likely moderate to large left pleural effusion with  pleural fluid seen at the apex.. There is atherosclerotic vascular calcification involving the thoracic aorta and great vessels. The timing of the contrast bolus because the patient's heart failure does not allow for assessment for thoracic aortic  dissection. The patient has a pacemaker.    There is severe alveolar disease left lung with near complete consolidation of the left lower lobe and considerable consolidation of the left upper lobe. Please correlate for clinical evidence of aspiration or pneumonia. There is patchier airspace  disease in the right lung including the right middle lobe, right lower lobe and right upper lobe. Many of the opacities are nodular and the possibility of metastatic disease or atypical infectious disease should also be considered.. Please correlate with  the patient's history. This study is not sensitive for adenopathy given the amount of pleural and parenchymal disease. There is at least partially calcified subcarinal node up to about 3.2 x 2.2 cm in dimension. Compression fracture seen at the  thoracolumbar junction. There may be a left adrenal mass lesion 3.4 x 2.4 cm. This is partly in the field-of-view.      Impression:       No evidence for pulmonary most.  2. Globular cardiac silhouette enlargement.  3. Bilateral pleural effusions.  4. Extensive alveolar disease asymmetrically worse on the left than the right. In addition to the airspace disease there are multiple bilateral nodular lesions in the lungs. This could be infectious or inflammatory in etiology but metastatic disease is  also in the differential.  5. Partially calcified subcarinal lymph node.  6. No pneumothorax  7. Concern for left adrenal mass partly in the field-of-view.    Signer Name: Rosario Sullivan MD   Signed: 9/4/2019 5:14 AM    Workstation Name: [facility]    [facility]        Impression: CHF  Dyspnea  Dementia  [facility]      Plan: We will plan on seeing how patient does over the next 24 to possibly 48 hours to better assess whether she is inpatient hospice appropriate versus outpatient hospice appropriate.  If she does have a long-term care bed at the nursing facility outpatient hospice may be the most appropriate.        Chuy Cavazos DO  09/05/19  2:53 PM              Electronically signed by Chuy Cavazos DO at 9/5/2019  2:56 PM     Didier Del Real MD at 9/4/2019  9:58 AM      Consult Orders    1. Inpatient Infectious Diseases Consult [[id number]] ordered by Adela Gomez PA at 09/04/19 0613                Infectious Disease Consult  Imelda Maldonado  4/29/1923  [id number]    Date of Consult: 9/4/2019  Admission Date: 9/4/2019    Requesting Provider: Beverly GUZMAN MD  Evaluating Physician: Didier Del Real MD    Chief Complaint: shortness of breath, cough    Reason for Consultation: possible pneumonia    History of present illness:   Patient is a 89 y.o.  Yr old female with history of idiopathic cardiomyopathy (EF 20%), osteoarthritis, CKD, DVT, gout, pacemaker/ICD, dementia. Recent admission from 8/19 to 8/21 with heart failure, tx with diuresis.    Presented back to [facility] ED on 9/4 from SNF due to concerns of of worsening shortness of breath and productive cough. Afebrile.  Unfortunately she is not oriented to place or time and is unable to provide any additional history. She is currently on 3 L O2. BP stable. WBC 6. Blood cx pending. CTA concerning for left sided pnuemonia, Started on vancomycin and zosyn. BNP is 7535. Lactate 1.0. ID consulted for assistance with antibiotics. Currently resting comfortably. RN has not appreciated any cough. Patient complains of chronic chest pain.     Review of Systems  Unable to provide other than as above due to dementia      Past Medical History:   Diagnosis Date    • Acute GI bleeding     12/09/2013 - 12/24/2013. Status post 7 units of packed red blood cells. Esophagogastroduodenoscopy (no active bleed) and colonoscopy (active bleeding from flat cecal polyps and two ascending colonic polyps) suggestive of non-steroidal anti-inflammatory drug colopathy or mild ischemia. Positive for Clostridium difficile status post 14 days of antibiotics.     • Arthritis     reinitiation of celebrex, October 2013   • CRI (chronic renal insufficiency)     mild   • DVT (deep venous thrombosis) (CMS/HCC)     RLE   • Gout    • Histoplasmosis    • Hyperlipidemia    • Hypertension    • Hypotension     symptoms of dizziness   • Idiopathic cardiomyopathy (CMS/HCC)        Past Surgical History:   Procedure Laterality Date   • APPENDECTOMY     • HYSTERECTOMY     • PACEMAKER IMPLANTATION      change out on 10/30/15 without complication   • POLYPECTOMY     • THYROIDECTOMY      chronic replacement therapy       Social History     Socioeconomic History   • Marital status:      Spouse name: Not on file   • Number of children: Not on file   • Years of education: Not on file   • Highest education level: Not on file   Tobacco Use   • Smoking status: Former Smoker     Types: Cigarettes   • Smokeless tobacco: Never Used   Substance and Sexual Activity   • Drug use: No   • Sexual activity: Defer       family history includes Arthritis in her other; Diabetes in her other; Heart disease in her other; Hypertension in her other; Thyroid disease in her other.    No Known Allergies    Antibiotics:  Anti-Infectives (From admission, onward)    Ordered     Dose/Rate Route Frequency Start Stop    09/04/19 0621  vancomycin (dosing per levels)     Ordering Provider:  Didier Mosse [facility]     Does not apply Daily 09/05/19 0600 09/09/19 0559    09/04/19 0613  piperacillin-tazobactam (ZOSYN) 3.375 g in iso-osmotic dextrose 50 ml (premix)     Ordering Provider:  Adela Gomez PA    3.375 g  over 4 Hours  Intravenous Every 8 Hours Scheduled 09/04/19 1200 09/09/19 1159    09/04/19 0613  Pharmacy to dose vancomycin     Ordering Provider:  Adela Gomez PA     Does not apply Continuous PRN 09/04/19 0613 09/09/19 0612    09/04/19 0522  vancomycin 1250 mg/250 mL 0.9% NS IVPB ([facility])     Ordering Provider:  Dung Valdez MD    20 mg/kg × 60.5 kg Intravenous Once 09/04/19 0524 09/04/19 0552    09/04/19 0522  piperacillin-tazobactam (ZOSYN) 3.375 g in iso-osmotic dextrose 50 ml (premix)     Ordering Provider:  Dung Valdez MD    3.375 g Intravenous Once 09/04/19 0524 09/04/19 0552          Other Medications:  Current Facility-Administered Medications   Medication Dose Route Frequency Provider Last Rate Last Dose   • acetaminophen (TYLENOL) tablet 650 mg  650 mg Oral Q6H PRN Beverly Glasgow MD       • carvedilol (COREG) tablet 12.5 mg  12.5 mg Oral BID With Meals Beverly Glasgow MD   12.5 mg at 09/04/19 0951   • diclofenac (VOLTAREN) 1 % gel 4 g  4 g Topical BID PRN Beverly Glasgow MD       • donepezil (ARICEPT) tablet 5 mg  5 mg Oral Nightly Beverly Glasgow MD       • ferrous sulfate tablet 325 mg  325 mg Oral Daily With Breakfast Beverly Glasgow MD   325 mg at 09/04/19 0950   • folic acid (FOLVITE) tablet 1 mg  1 mg Oral Daily Beverly Glasgow MD   1 mg at 09/04/19 0950   • furosemide (LASIX) injection 40 mg  40 mg Intravenous Once Luma Mane DO       • ipratropium-albuterol (DUO-NEB) nebulizer solution 3 mL  3 mL Nebulization Q4H PRN Adela Gomez PA   3 mL at 09/04/19 0637   • levothyroxine (SYNTHROID, LEVOTHROID) tablet 50 mcg  50 mcg Oral Q AM Beverly Glasgow MD   50 mcg at 09/04/19 0957   • lisinopril (PRINIVIL,ZESTRIL) tablet 2.5 mg  2.5 mg Oral Daily Beverly Glasgow MD   2.5 mg at 09/04/19 0950   • mirtazapine (REMERON) tablet 15 mg  15 mg Oral Daily Beverly Glasgow MD   15 mg at 09/04/19 0950   • piperacillin-tazobactam (ZOSYN) 3.375 g in iso-osmotic dextrose 50 ml (premix)  3.375 g  "Intravenous Q8H Adela Gomez PA        And   • Pharmacy to dose vancomycin   Does not apply Continuous PRN Adela Gomez PA       • sertraline (ZOLOFT) tablet 50 mg  50 mg Oral Daily Beverly Glasgow MD   50 mg at 09/04/19 0950   • sodium chloride 0.9 % flush 10 mL  10 mL Intravenous PRN Dung Valdez MD   10 mL at 09/04/19 0951   • [START ON 9/5/2019] vancomycin (dosing per levels)   Does not apply Daily Didier Moses, [facility]           Physical Exam:   Vital Signs   /52 (BP Location: Right arm, Patient Position: Lying)   Pulse 92   Temp 97.4 °F (36.3 °C) (Oral)   Resp 26   Ht 162.6 cm (64.02\")   Wt 60.5 kg (133 lb 6.1 oz)   LMP  (LMP Unknown)   SpO2 93%   BMI 22.88 kg/m²      GENERAL: Awake and alert, but not oriented. Elderly but in no acute distress.   HEENT: Normocephalic, atraumatic.  PERRL. EOMI. No conjunctival injection. No icterus. Oropharynx clear without evidence of thrush or exudate.   NECK: Supple without nuchal rigidity. Moderate JVD appreciated  LYMPH: No cervical lymphadenopathy.  HEART: RRR; No murmur.  LUNGS: Rales throughout the left lung fields.  Diminished at right base.  No cough appreciated  ABDOMEN: Soft, nontender, nondistended. Positive bowel sounds. No rebound or guarding. No CVA tenderness  : no gramajo  EXT:  Chronic joint deformities on both hands, but no cyanosis, clubbing or significant edema.  MSK: FROM without joint effusions noted arms/legs.    SKIN: Warm and dry without cutaneous eruptions on Inspection/palpation.    NEURO: Oriented to name but not place or time. No focal deficits on motor/sensory exam at arms/legs.  PSYCHIATRIC: dementia     Laboratory Data    Results from last 7 days   Lab Units 09/04/19  0405 09/04/19  0357   WBC 10*3/mm3  --  6.12   HEMOGLOBIN g/dL  --  7.8*   HEMOGLOBIN, POC g/dL 7.1*  --    HEMATOCRIT %  --  27.3*   HEMATOCRIT POC % 21*  --    PLATELETS 10*3/mm3  --  181     Results from last 7 days   Lab Units 09/04/19  0609 " 09/04/19  0357   SODIUM mmol/L  --  142   POTASSIUM mmol/L  --  4.9   CHLORIDE mmol/L  --  106   CO2 mmol/L  --  25.0   BUN mg/dL  --  33*   CREATININE mg/dL 1.30 1.06*   GLUCOSE mg/dL  --  91   CALCIUM mg/dL  --  7.4*     Estimated Creatinine Clearance: 24.2 mL/min (by C-G formula based on SCr of 1.3 mg/dL).  Results from last 7 days   Lab Units 09/04/19  0357   ALK PHOS U/L 84  84   BILIRUBIN mg/dL 0.3  0.3   BILIRUBIN DIRECT mg/dL <0.2*   ALT (SGPT) U/L <5  <5   AST (SGOT) U/L 16  16               Microbiology:    9/4 blood cx pending    MRSA and sputum cx pending    Radiology:  Xr Chest 1 View    Result Date: 9/4/2019  Globular cardiomediastinal silhouette enlargement with severe left greater the right alveolar disease. Left-sided pacer defibrillator. Signer Name: Rosario Sullivan MD  Signed: 9/4/2019 5:15 AM  Workstation [facility]: [facility]  [facility]    Ct Angiogram Chest With Contrast    Result Date: 9/4/2019  No evidence for pulmonary most. 2. Globular cardiac silhouette enlargement. 3. Bilateral pleural effusions. 4. Extensive alveolar disease asymmetrically worse on the left than the right. In addition to the airspace disease there are multiple bilateral nodular lesions in the lungs. This could be infectious or inflammatory in etiology but metastatic disease is also in the differential. 5. Partially calcified subcarinal lymph node. 6. No pneumothorax 7. Concern for left adrenal mass partly in the field-of-view. Signer Name: Rosario Sullivan MD  Signed: 9/4/2019 5:14 AM  Workstation Name: [employer]  [facility]     I personally reviewed the above imaging studies.      Impression:   21. Acute on chronic hypoxic respiratory failure: unknown baseline, currently comfortable on 3 L  22. Consolidation of LLL and DEJUAN on CT concerning for pneumonia:   23. Right pleural effusion  24. Acute on chronic systolic heart failure  25. Idiopathic cardiomyopathy (last EF  20%)  26. Dementia  27. Pacemaker, ICD  28. CKD stage 4  29. Hx of DVT  30. Hypothyroid    At this time is unclear to me how much worse than her baseline she truly is.  We will have to corroborate more history from family and skilled nursing facility.  She does have extensive pulmonary consolidations on the left and rales on exam although she do not demonstrate any cough during my interview.  She is nontoxic-appearing and has normal WBC, lactic acid, suggesting against infection however she is certainly high risk for healthcare associated pneumonia given her recent admissions and residence in a skilled nursing facility. Also high risk for aspiration. She likely has combination of both pneumonia and acute heart failure and will treat with empiric antibiotics as well as diuresis.    PLAN: Thank you for asking us to see Imelda E Erica, I recommend the following:   - f/u pending blood cx  - sputum culture if cough is productive  - MRSA nares screen, discussed with RN  - trend CBC, BMP  - check procalcitonin    - since she is non-toxic will stop vancomycin. She already had a dose this AM which will cover for the next 24 hrs at least  - continue empiric zosyn, renally dosed, pending clinical improvement    - diuresis and other supportive care per primary team    Discussed with Dr Glasgow. I will follow. Call with questions.     Didier Del Real MD  2019      Electronically signed by Didier Del Real MD at 2019 10:43 AM     Les Cardozo MD at 2019  9:06 AM      Consult Orders    1. Inpatient Cardiology Consult [[id number]] ordered by Luma Mane DO at 19 0627                [facility]  CARDIOLOGY CONSULTATION NOTE    Imelda BARRERA Erica  : 1923  MRN:[id number]    Date of Admission:2019  Date of Consultation: 19    PCP: Leyla Castro DO    IDENTIFICATION: A 89 y.o. female resident of [facility]     Chief  Complaint   Patient presents with   • Shortness of Breath     PROBLEM LIST:   1. 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   a. Left bundle branch block.  b. History of nonsustained ventricular ectopy.  c. “Normal” coronary arteries by catheterization, October 2003, echo ejection fraction of 30%, October 2003.  d. Echocardiogram, 01/27/2004:  Global hypokinesis, ejection fraction 25%, moderate mitral regurgitation.  e. [employer] BI-V pacemaker implantation by Dr. Naidu on 04/16/2010 also with a coronary sinus lead placement.    f. Patient refuses upgrade to an ICD and Coumadin, March 2010.    g. Atrial fibrillation with “failed” cardioversion (early recurrence), January 2010.  h. Biventricular pacemaker generator change, 10/30/2015.  i. Echocardiogram, 10/1/18 with severe MR and moderate AI, EF=21%-25%.  j. Acute on chronic SHF August 2019   1. Echo 8/19/19: LV and LA are mod-severely dilated, mod-severe MR, EF 24%, no evidence of PAH; moderately reduced RVSF   2. Acute gastrointestinal bleed with hospitalization, 12/09/2013 - 12/24/2013.  a. Status post 7 units of packed red blood cells.  b. Esophagogastroduodenoscopy (no active bleed) and colonoscopy (active bleeding from flat cecal polyps and two ascending colonic polyps) suggestive of non-steroidal anti-inflammatory drug colopathy or mild ischemia.    c. Positive for Clostridium difficile status post 14 days of antibiotics.  d. Discontinuation of aspirin, no anticoagulation.    3. Arthritis.  a. Reinitiation of Celebrex, October 2013.  4. Dyslipidemia.  5. Gout.  6. History of histoplasmosis, OS.  7. History of remote DVT, RLE.  8. Status post thyroidectomy, remote.  a. Chronic replacement therapy.  9. History of intermittent mild CRI.  10. Hypertension.  11. Surgical history.  a. Appendectomy.  b. Thyroidectomy.  c. Hysterectomy.  d. Polypectomy.  12. Dementia   13. DNR code   14. Healthcare associated pneumonia September  "2019  15. Anemia     ALLERGIES: No Known Allergies    HPI: Patient is a poor historian due to dementia, and history obtained mostly from chart. She is a 89 y/o WF who was recently admitted a few weeks ago for acute/chronic SHF. She was diuresed and discharged back to her nursing home. She returns with acute/chronic hypoxia, and CT chest shows worsening left lung disease, concerning for pneumonia. She has been started on IV antibiotics and ID has been consulted. Patient denies dyspnea, cough, fever or chills. She denies chest pain. She is visibly dyspneic however, with increased work of breathing. Her proBNP is 7500 and cardiology has been consulted for acute/chronic SHF. Further history cannot be obtained from the patient.       ROS: Limited, see above for pertinent negatives.     Surgical History:   Past Surgical History:   Procedure Laterality Date   • APPENDECTOMY     • HYSTERECTOMY     • PACEMAKER IMPLANTATION      change out on 10/30/15 without complication   • POLYPECTOMY     • THYROIDECTOMY      chronic replacement therapy     Social History:   Social History     Socioeconomic History   • Marital status:    Tobacco Use   • Smoking status: Former Smoker     Types: Cigarettes   • Smokeless tobacco: Never Used   Substance and Sexual Activity   • Drug use: No   • Sexual activity: Defer     Family History:   Family History   Problem Relation Age of Onset   • Arthritis Other    • Diabetes Other    • Heart disease Other    • Hypertension Other    • Thyroid disease Other        Objective     /52 (BP Location: Right arm, Patient Position: Lying)   Pulse 92   Temp 97.4 °F (36.3 °C) (Oral)   Resp 26   Ht 162.6 cm (64.02\")   Wt 60.5 kg (133 lb 6.1 oz)   LMP  (LMP Unknown)   SpO2 93%   BMI 22.88 kg/m²      Intake/Output Summary (Last 24 hours) at 9/4/2019 0906  Last data filed at 9/4/2019 0552  Gross per 24 hour   Intake 100 ml   Output --   Net 100 ml     PHYSICAL EXAM:  CONSTITUTIONAL: Elderly " female, frail, cooperative, in no acute distress  HEENT: Normocephalic, atraumatic, PERRLA, no JVD  CARDIOVASCULAR:  Regular rhythm and normal rate, distant heart tones due to coarse lung sounds.   RESPIRATORY: Mildly increased respiratory effort, coarse lung sounds and diffuse ronchi bilaterally, worse on the left. On 3L NC  GI: Soft, nontender  MUSCULOSKELETAL: No gross deformities, no edema  SKIN: Warm, dry. No bleeding,  + bruising on right anterior shin, no rash  NEUROLOGICAL: No focal deficits  PSYCHIATRIC: Oriented to self only.  Normal mood and affect.    Labs/Diagnostic Data  Results from last 7 days   Lab Units 09/04/19  0405 09/04/19  0357   SODIUM mmol/L  --  142   POTASSIUM mmol/L  --  4.9   CHLORIDE mmol/L  --  106   CO2 mmol/L  --  25.0   BUN mg/dL  --  33*   CREATININE mg/dL 1.30 1.06*   GLUCOSE mg/dL  --  91   CALCIUM mg/dL  --  7.4*     Results from last 7 days   Lab Units 09/04/19  0639 09/04/19  0357   TROPONIN T ng/mL 0.024 0.033*     Results from last 7 days   Lab Units 09/04/19  0405 09/04/19  0357   WBC 10*3/mm3  --  6.12   HEMOGLOBIN g/dL  --  7.8*   HEMOGLOBIN, POC g/dL 7.1*  --    HEMATOCRIT %  --  27.3*   HEMATOCRIT POC % 21*  --    PLATELETS 10*3/mm3  --  181       Results from last 7 days   Lab Units 09/04/19  0357   PROBNP pg/mL 7,535.0*     Results from last 7 days   Lab Units 09/04/19  0356   PROTIME Seconds 15.3*   INR  1.27*     I personally reviewed the patient's EKG/Telemetry data    Radiology Data:   CTA chest 9/4/19:  IMPRESSION:  No evidence for pulmonary most.  2. Globular cardiac silhouette enlargement.  3. Bilateral pleural effusions.  4. Extensive alveolar disease asymmetrically worse on the left than the right. In addition to the airspace disease there are multiple bilateral nodular lesions in the lungs. This could be infectious or inflammatory in etiology but metastatic disease is  also in the differential.  5. Partially calcified subcarinal lymph node.  6. No  "pneumothorax  7. Concern for left adrenal mass partly in the field-of-view.    Current Medications:    carvedilol 12.5 mg Oral BID With Meals   donepezil 5 mg Oral Nightly   ferrous sulfate 325 mg Oral Daily With Breakfast   folic acid 1 mg Oral Daily   furosemide 40 mg Intravenous Once   levothyroxine 50 mcg Oral Q AM   lisinopril 2.5 mg Oral Daily   mirtazapine 15 mg Oral Daily   piperacillin-tazobactam 3.375 g Intravenous Q8H   sertraline 50 mg Oral Daily   [START ON 9/5/2019] vancomycin (dosing per levels)  Does not apply Daily       Pharmacy to dose vancomycin        Assessment and Plan:     1. Health care associated pneumonia  - per hospitalists and ID    2. A/C SHF  - recent echo with EF 24% and dilated LV  - on low dose Lisinopril as previously could not tolerate Entresto due to marginal low BPs last admission   - s/p IV Lasix 40mg - strict I&Os     3. Anemia  - per hospitalists    4. Dementia/DNR    I know her well after following her in my office for years. 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 I have nothing to add other than to urge referral to hospice care. This will minimize the discomforts, costs, and angst  associated with repeat hospital admissions to palliate symptoms of an underlying disease that is clearly end-stage.      Scribed for Les Cardozo MD by Danna Rodriguez PA-C. 9/4/2019  9:06 AM      Thank you for allowing me to participate in the care of Imelda Maldonado. Feel free to contact me directly with any further questions or concerns.      Electronically signed by Les Cardozo MD at 9/4/2019  2:46 PM       "

## 2019-09-06 NOTE — PLAN OF CARE
Problem: Patient Care Overview  Goal: Plan of Care Review  Outcome: Ongoing (interventions implemented as appropriate)   09/06/19 1147   Coping/Psychosocial   Plan of Care Reviewed With patient   SLP treatment completed. Will continue to address dysphagia via family education for use of swallow precautions. Please see note for further details and recommendations.

## 2019-09-06 NOTE — DISCHARGE PLACEMENT REQUEST
"Imelda Rdz (89 y.o. Female)   Discharge summary and discharge medications to be sent when complete            Date of Birth Social Security Number Address Home Phone MRN    [id number]  [address] [facility] [address] [phone] [id number]    Buddhism Marital Status          None        Admission Date Admission Type Admitting Provider Attending Provider Department, Room/Bed    19 Emergency Beverly Glasgow MD Reddy, Mayuri V, MD [facility] 6B, N630/1    Discharge Date Discharge Disposition Discharge Destination                       Attending Provider:  Beverly Glasgow MD    Allergies:  No Known Allergies    Isolation:  None   Infection:  None   Code Status:  No CPR    Ht:  162.6 cm (64.02\")   Wt:  60.5 kg (133 lb 6.1 oz)    Admission Cmt:  None   Principal Problem:  Acute on chronic congestive heart failure (CMS/HCC) [I50.9]                 Active Insurance as of 2019     Primary Coverage     Payor Plan Insurance Group Employer/Plan Group    MEDICARE MEDICARE A & B      Payor Plan Address Payor Plan Phone Number Payor Plan Fax Number Effective Dates    [address] [phone]  1988 - None Entered    [place] [address]       Subscriber Name Subscriber Birth Date Member ID       IMELDA RDZ 1923 [id number]                 Emergency Contacts      (Rel.) Home Phone Work Phone Mobile Phone    Earlene Fang (Daughter) [phone] -- --               History & Physical      Beverly Glasgow MD at 2019  6:43 AM              [facility] Medicine Services  HISTORY AND PHYSICAL    Patient Name: Imelda Rdz  : 1923  MRN: [id number]  Primary Care Physician: Leyla Castro DO  Date of admission: 2019      Subjective   Subjective     Chief Complaint:  SOB, chest discomfort    HPI:  Imelda Rdz is a 89 y.o. female significant for chronic systolic heart failure, BiV-ICD, CAD, CKD 3, Afib, DVT, GI bleed, " and dementia who presents to the ED from [facility] with complaint of shortness of breath.  Pt is a poor historian, but per the ED, patient was found to be SOB and hypoxic.  She is currently satting 91% on 3LNC.  Pt also found to have persistent elevated troponin at .033.  Pt was recently hospitalized 8/19 with CHF exacerbation.      Patient does have history of dementia and is a poor historian, but she is complaining of SOB. She has audible rhonchi.  Imaging showed LLL consolidation and significant airspace disease.  Pt given vanc/zosyn, duonebs.      Pt is reported to be a DNR. Hospital medicine will admit the patient for further evaluatoin and treatment.     Review of Systems   Unable to perform ROS: Dementia   Respiratory: Positive for chest tightness and shortness of breath.    All other systems reviewed and are negative.         All other systems reviewed and are negative.     Personal History     Past Medical History:   Diagnosis Date   • Acute GI bleeding     12/09/2013 - 12/24/2013. Status post 7 units of packed red blood cells. Esophagogastroduodenoscopy (no active bleed) and colonoscopy (active bleeding from flat cecal polyps and two ascending colonic polyps) suggestive of non-steroidal anti-inflammatory drug colopathy or mild ischemia. Positive for Clostridium difficile status post 14 days of antibiotics.     • Arthritis     reinitiation of celebrex, October 2013   • CRI (chronic renal insufficiency)     mild   • DVT (deep venous thrombosis) (CMS/HCC)     RLE   • Gout    • Histoplasmosis    • Hyperlipidemia    • Hypertension    • Hypotension     symptoms of dizziness   • Idiopathic cardiomyopathy (CMS/HCC)        Past Surgical History:   Procedure Laterality Date   • APPENDECTOMY     • HYSTERECTOMY     • PACEMAKER IMPLANTATION      change out on 10/30/15 without complication   • POLYPECTOMY     • THYROIDECTOMY      chronic replacement therapy       Family History: family history includes  Arthritis in her other; Diabetes in her other; Heart disease in her other; Hypertension in her other; Thyroid disease in her other. Otherwise pertinent FHx was reviewed and unremarkable.     Social History:  reports that she has quit smoking. Her smoking use included cigarettes. She has never used smokeless tobacco. She reports that she does not use drugs.  Social History     Social History Narrative   • Not on file       Medications:    Available home medication information reviewed.  Medications Prior to Admission   Medication Sig Dispense Refill Last Dose   • acetaminophen (TYLENOL) 325 MG tablet Take 650 mg by mouth Every 6 (Six) Hours As Needed for mild pain (1-3).   Taking   • bisacodyl (BISAC-EVAC) 10 MG suppository Insert 10 mg into the rectum Daily As Needed for constipation.   Taking   • carvedilol (COREG) 12.5 MG tablet Take 12.5 mg by mouth 2 (Two) Times a Day With Meals.   9/3/2019 at 2138   • diclofenac (VOLTAREN) 1 % gel gel Apply 4 g topically to the appropriate area as directed 2 (Two) Times a Day As Needed.   Taking   • donepezil (ARICEPT) 5 MG tablet Take 5 mg by mouth Every Night.   9/3/2019 at 2138   • ferrous sulfate 325 (65 FE) MG tablet Take 325 mg by mouth Daily With Breakfast.   9/3/2019 at 1131   • folic acid (FOLVITE) 1 MG tablet Take 1 mg by mouth Daily.   9/3/2019 at 1131   • furosemide (LASIX) 40 MG tablet Take 1 tablet by mouth 3 (Three) Times a Week.   9/2/2019 at 2128   • levothyroxine (SYNTHROID, LEVOTHROID) 50 MCG tablet Take 50 mcg by mouth Daily.   9/3/2019 at 0505   • lisinopril (PRINIVIL,ZESTRIL) 2.5 MG tablet Take 2.5 mg by mouth Daily.   9/3/2019 at 1131   • Magnesium Hydroxide (MILK OF MAGNESIA PO) Take 30 mL by mouth Daily As Needed.   5/22/2019 at 1114   • mirtazapine (REMERON) 7.5 MG half tablet Take 15 mg by mouth Daily.   9/3/2019 at 0938   • nitroglycerin (NITROSTAT) 0.4 MG SL tablet Place 0.4 mg under the tongue Every 5 (Five) Minutes As Needed for Chest Pain. Take  no more than 3 doses in 15 minutes.   Taking   • sertraline (ZOLOFT) 50 MG tablet Take 50 mg by mouth Daily.   9/3/2019 at 2138       No Known Allergies    Objective   Objective     Vital Signs:   Temp:  [97.7 °F (36.5 °C)-98.3 °F (36.8 °C)] 98.3 °F (36.8 °C)  Heart Rate:  [73-75] 75  Resp:  [18-28] 26  BP: (143-154)/(62-68) 143/68        Physical Exam   Constitutional: Awake, alert, frail  Eyes: PERRLA, sclerae anicteric, no conjunctival injection  HENT: NCAT, mucous membranes moist  Neck: Supple, no thyromegaly, no lymphadenopathy, trachea midline  Respiratory: bilateral course rhonchi, mild tachypnea   Cardiovascular: RRR, no murmurs, rubs, or gallops, palpable pedal pulses bilaterally  Gastrointestinal: Positive bowel sounds, soft, nontender, nondistended  Musculoskeletal: No bilateral ankle edema, no clubbing or cyanosis to extremities  Psychiatric: demented  Neurologic: Oriented to self only, strength symmetric in all extremities, Cranial Nerves grossly intact to confrontation, speech clear  Skin: bruising to legs      Results Reviewed:  I have personally reviewed current lab and radiology data.    Results from last 7 days   Lab Units 09/04/19 0405 09/04/19 0357 09/04/19  0356   WBC 10*3/mm3  --  6.12  --    HEMOGLOBIN g/dL  --  7.8*  --    HEMOGLOBIN, POC g/dL 7.1*  --   --    HEMATOCRIT %  --  27.3*  --    HEMATOCRIT POC % 21*  --   --    PLATELETS 10*3/mm3  --  181  --    INR   --   --  1.27*     Results from last 7 days   Lab Units 09/04/19  0531 09/04/19 0405 09/04/19  0357   SODIUM mmol/L  --   --  142   POTASSIUM mmol/L  --   --  4.9   CHLORIDE mmol/L  --   --  106   CO2 mmol/L  --   --  25.0   BUN mg/dL  --   --  33*   CREATININE mg/dL  --  1.30 1.06*   GLUCOSE mg/dL  --   --  91   CALCIUM mg/dL  --   --  7.4*   ALT (SGPT) U/L  --   --  <5  <5   AST (SGOT) U/L  --   --  16  16   TROPONIN T ng/mL  --   --  0.033*   PROBNP pg/mL  --   --  7,535.0*   LACTATE mmol/L 1.0  --   --      Estimated  Creatinine Clearance: 24.2 mL/min (by C-G formula based on SCr of 1.3 mg/dL).  Brief Urine Lab Results  (Last result in the past 365 days)      Color   Clarity   Blood   Leuk Est   Nitrite   Protein   CREAT   Urine HCG        10/01/18 0159 Yellow Clear Negative Trace Negative 30 mg/dL (1+)             Imaging Results (last 24 hours)     Procedure Component Value Units Date/Time    XR Chest 1 View [[id number]] Collected:  09/04/19 0515     Updated:  09/04/19 0517    Narrative:       CR Chest 1 Vw    INDICATION:   Short of air and hypoxia     COMPARISON:    Chest x-ray 8/19/2019.    FINDINGS:  Single portable AP view(s) of the chest.  Globular cardiomediastinal silhouette enlargement and left-sided pacer/fibrillator again seen. Severe alveolar disease left greater than right. See accompanying CT chest dictation. No pneumothorax.      Impression:       Globular cardiomediastinal silhouette enlargement with severe left greater the right alveolar disease. Left-sided pacer defibrillator.    Signer Name: Rosario Sullivan MD   Signed: 9/4/2019 5:15 AM   Workstation Name: [email or web address]    [facility]    CT Angiogram Chest With Contrast [[id number]] Collected:  09/04/19 0514     Updated:  09/04/19 0516    Narrative:       INDICATION:   Hypoxic, short of air, recent fracture right arm.    TECHNIQUE:   CT angiogram of the chest with contrast. 3-D reformatted images were acquired.  Radiation dose reduction techniques included automated exposure control or exposure modulation based on body size. Radiation audit for number of CT and nuclear cardiology  exams performed in the last year:  0. Dose recorded in the patient's chart.    COMPARISON:   Chest x-ray from 8/19/2019.    FINDINGS:   There is no evidence for pulmonary embolism. There is globular cardiac silhouette enlargement with small to moderate pericardial effusion. There is moderate right-sided pleural effusion. There is likely moderate to large left pleural  effusion with  pleural fluid seen at the apex.. There is atherosclerotic vascular calcification involving the thoracic aorta and great vessels. The timing of the contrast bolus because the patient's heart failure does not allow for assessment for thoracic aortic  dissection. The patient has a pacemaker.    There is severe alveolar disease left lung with near complete consolidation of the left lower lobe and considerable consolidation of the left upper lobe. Please correlate for clinical evidence of aspiration or pneumonia. There is patchier airspace  disease in the right lung including the right middle lobe, right lower lobe and right upper lobe. Many of the opacities are nodular and the possibility of metastatic disease or atypical infectious disease should also be considered.. Please correlate with  the patient's history. This study is not sensitive for adenopathy given the amount of pleural and parenchymal disease. There is at least partially calcified subcarinal node up to about 3.2 x 2.2 cm in dimension. Compression fracture seen at the  thoracolumbar junction. There may be a left adrenal mass lesion 3.4 x 2.4 cm. This is partly in the field-of-view.      Impression:       No evidence for pulmonary most.  2. Globular cardiac silhouette enlargement.  3. Bilateral pleural effusions.  4. Extensive alveolar disease asymmetrically worse on the left than the right. In addition to the airspace disease there are multiple bilateral nodular lesions in the lungs. This could be infectious or inflammatory in etiology but metastatic disease is  also in the differential.  5. Partially calcified subcarinal lymph node.  6. No pneumothorax  7. Concern for left adrenal mass partly in the field-of-view.    Signer Name: Rosario Sullivan MD   Signed: 9/4/2019 5:14 AM   Workstation Name: [email or web address]    [facility]        Results for orders placed during the hospital encounter of 08/19/19   Adult Transthoracic Echo  Complete With Contrast if Necessary Per Protocol    Narrative · The left ventricular cavity is moderate-to-severely dilated.  · Left atrial cavity size is moderate-to-severely dilated.  · Moderate aortic valve regurgitation is present.  · Moderate-to-severe mitral valve regurgitation is present.  · Mild tricuspid valve regurgitation is present.  · Mild pulmonic valve regurgitation is present.  · Mild dilation of the aortic root and of the sinuses Valsalva present.  · Left ventricular wall thickness is consistent with mild concentric   hypertrophy.  · Estimated EF = 24%.  · Left ventricular systolic function is severely decreased.  · The following left ventricular wall segments are hypokinetic: basal   anterolateral, basal inferolateral, mid inferolateral, apical inferior,   mid inferior, basal inferoseptal, basal anterior, basal inferior and basal   inferoseptal. The following left ventricular wall segments are akinetic:   mid anterior, apical anterior, mid anterolateral, apical lateral, apical   septal, mid inferoseptal, apex and mid anteroseptal.  · The findings are consistent with dilated cardiomyopathy.  · Normal right ventricular cavity size and wall thickness with moderately   reduced right ventricular systolic function noted; abnormal septal motion   consistent with RV pacemaker.  · The aortic valve exhibits mild sclerosis.  · No evidence of pulmonary hypertension is present.  · There is a trivial pericardial effusion adjacent to the left ventricle.          Assessment/Plan   Assessment / Plan     Active Hospital Problems    Diagnosis POA   • HCAP (healthcare-associated pneumonia) [J18.9] Yes   • Acute on chronic congestive heart failure (CMS/HCC) [I50.9] Yes   • Hypothyroidism (acquired) [E03.9] Yes   • Dementia [F03.90] Yes   • Elevated troponin [R74.8] Yes   • Pacemaker [Z95.0] Yes   • Acute and chronic respiratory failure with hypoxia (CMS/HCC) [J96.21] Yes   • CKD (chronic kidney disease) stage 3, GFR  30-59 ml/min (CMS/HCC) [N18.3] Yes   • Idiopathic cardiomyopathy (CMS/HCC) [I42.8] Yes     1. 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   a. Left bundle branch block.  b. History of nonsustained ventricular ectopy.  c. “Normal” coronary arteries by catheterization, October 2003, echo ejection fraction of 30%, October 2003.  d. Echocardiogram, 01/27/2004:  Global hypokinesis, ejection fraction 25%, moderate mitral regurgitation.  e. Brand [employer] BI-V pacemaker implantation by Dr. Naidu on 04/16/2010 also with a coronary sinus lead placement.    f. Patient refuses upgrade to an ICD and Coumadin, March 2010.    g. Atrial fibrillation with “failed” cardioversion (early recurrence), January 2010.  h. Biventricular pacemaker generator change, 10/30/2015.         • Hyperlipidemia [E78.5] Yes   • Essential hypertension [I10] Yes       1.  Acute on chronic hypoxic respiratory failure  - will continue supplemental oxygen to keep sats>91%  - duonebs prn    2. HCAP  - continue vanc/zosyn, consult ID  - sputum, blood cultures  - lactate pending    3. Acute on chronic CHF  - IV lasix prn, scheduled lasix PO  - consider cardiology consult pending clinical course    4.  Anemia  - transfuse 1 unit PRBC now    5. Hypothyroidism  - resume synthroid    6. Left renal mass  - consider hem/onc consult    7. Hypertension  - resume home meds    8. Dementia/FTT        DVT prophylaxis:  SCDs, heparin SQ    CODE STATUS:    Code Status and Medical Interventions:   Ordered at: 09/04/19 0657     Code Status:    No CPR     Medical Interventions (Level of Support Prior to Arrest):    Comfort Measures     Comments:    per advanced directive on chart       Admission Status:  I believe this patient meets INPATIENT status due to the need for care which can only be reasonably provided in an hospital setting due to HCAP, requiring IV antibiotics, oxygen, infectious disease consult.  As such, I feel patient’s risk for  adverse outcomes and need for care warrant INPATIENT evaluation and predict the patient’s care encounter to likely last beyond 2 midnights.        Electronically signed by NASIM Sands, 09/04/19, 6:44 AM.      Brief Attending Admission Attestation     I have seen and examined the patient, performing an independent face-to-face diagnostic evaluation with plan of care reviewed and developed with the advanced practice clinician (APC).      Brief Summary Statement:   Imelda Maldonado is a 89 y.o. female with hx of dementia, CAD, chronic systolic CHF (EF 25%) s/p BiV-ICD, HTN, HLD, remote DVT, GI bleed in 2013, hx of C.diff, and Afib (not anticoagulated due to hx of GI bleed) who presents due to SOA and hypoxia. Recent hospitalization 8/19-8/21 due to CHF exacerbation. CTA chest in the ER showed no PE but moderate bilateral pleural effusions, alveolar disease worse on the left with near complete consolidation of the left lower lobe. proBNP elevated at 7535 with mild elevation in troponin 0.033.  Admitted for treatment of CHF and HCAP.    Remainder of detailed HPI is as noted above and has been reviewed and/or edited by me for completeness.      Attending Physical Exam:  Constitutional: Awake, alert, frail appearance  Eyes: PERRLA, sclerae anicteric, no conjunctival injection  HENT: NCAT, mucous membranes moist  Neck: Supple, no thyromegaly, no lymphadenopathy, trachea midline  Respiratory: coarse breath sounds bilaterally, nonlabored respirations   Cardiovascular: RRR, no murmurs, rubs, or gallops, palpable pedal pulses bilaterally  Gastrointestinal: Positive bowel sounds, soft, nontender, nondistended  Musculoskeletal: No bilateral ankle edema, no clubbing or cyanosis to extremities  Psychiatric: Appropriate affect, cooperative, pleasant  Neurologic: Oriented x 1, answers simple questions appropriately, strength symmetric in all extremities, Cranial Nerves grossly intact to confrontation, speech clear  Skin: No  "zoraida        Brief Assessment/Plan :  See above for further detailed assessment and plan developed with [facility] which I have reviewed and/or edited for completeness.    PLAN:  --Intermittent diuresis. Cardiology consulted.   --Continue HCAP coverage. ID consulted. Have stopped Vanc and will continue on Zosyn for now.  --Transfuse 1 unit for Hb 7.1. No signs of acute blood loss. Monitor.  --She has recurrent hospitalizations for CHF exacerbations, this is her second one in just 2 weeks. May want to involve Palliative Care at some point given her advanced age, dementia, and multiple comorbidities.  --Incidental finding of left adrenal mass on imaging. Will defer further workup at this time, can be pursued as outpatient if desired by family.    Electronically signed by Beverly Glasgow MD, 09/04/19, 12:22 PM             Electronically signed by Beverly Glasgow MD at 9/4/2019 12:24 PM          Emergency Department Notes      Dung Valdez MD at 9/4/2019  4:09 AM          Subjective   89-year-old female with a history of dementia presents for evaluation of shortness of breath.  Of note, the patient has a long past medical history and wears home oxygen at all times.  She lives in a nursing home.  She reportedly had a recent \"shoulder fracture.\"  She is unable to provide us with any history at this time.  According to EMS personnel and nursing home staff, she has been complaining of increased shortness of breath over the past 24 hours.  She had an outpatient chest x-ray that reportedly showed a \"right pleural effusion.\"  No fevers.  She has had a productive cough.  She was subsequently brought to the ED to be evaluated.            Review of Systems   Unable to perform ROS: Dementia   Respiratory: Positive for cough and shortness of breath.        Past Medical History:   Diagnosis Date   • Acute GI bleeding     12/09/2013 - 12/24/2013. Status post 7 units of packed red blood cells. Esophagogastroduodenoscopy (no active " bleed) and colonoscopy (active bleeding from flat cecal polyps and two ascending colonic polyps) suggestive of non-steroidal anti-inflammatory drug colopathy or mild ischemia. Positive for Clostridium difficile status post 14 days of antibiotics.     • Arthritis     reinitiation of celebrex, October 2013   • CRI (chronic renal insufficiency)     mild   • DVT (deep venous thrombosis) (CMS/HCC)     RLE   • Gout    • Histoplasmosis    • Hyperlipidemia    • Hypertension    • Hypotension     symptoms of dizziness   • Idiopathic cardiomyopathy (CMS/HCC)        No Known Allergies    Past Surgical History:   Procedure Laterality Date   • APPENDECTOMY     • HYSTERECTOMY     • PACEMAKER IMPLANTATION      change out on 10/30/15 without complication   • POLYPECTOMY     • THYROIDECTOMY      chronic replacement therapy       Family History   Problem Relation Age of Onset   • Arthritis Other    • Diabetes Other    • Heart disease Other    • Hypertension Other    • Thyroid disease Other        Social History     Socioeconomic History   • Marital status:      Spouse name: Not on file   • Number of children: Not on file   • Years of education: Not on file   • Highest education level: Not on file   Tobacco Use   • Smoking status: Former Smoker     Types: Cigarettes   • Smokeless tobacco: Never Used   Substance and Sexual Activity   • Drug use: No   • Sexual activity: Defer           Objective   Physical Exam   Constitutional: She is oriented to person, place, and time. She appears well-developed and well-nourished. No distress.   Nontoxic-appearing elderly female   HENT:   Head: Normocephalic and atraumatic.   Mouth/Throat: Oropharynx is clear and moist.   Eyes: EOM are normal. Pupils are equal, round, and reactive to light.   Neck: Normal range of motion. Neck supple. No JVD present.   Cardiovascular: Normal rate, regular rhythm and normal heart sounds. Exam reveals no gallop and no friction rub.   No murmur  heard.  Pulmonary/Chest: Effort normal. No respiratory distress. She has no wheezes. She has no rales.   Nonlabored breathing, coarse breath sounds noted on right when compared to left, no accessory muscle use, no retractions   Abdominal: Soft. Bowel sounds are normal. She exhibits no distension and no mass. There is no tenderness. There is no rebound and no guarding.   Musculoskeletal: Normal range of motion.        Right lower leg: She exhibits no edema.        Left lower leg: She exhibits no edema.   Neurological: She is alert and oriented to person, place, and time.   Skin: Skin is warm and dry. No rash noted. She is not diaphoretic. No erythema.   Psychiatric: She has a normal mood and affect. Judgment and thought content normal.   Nursing note and vitals reviewed.      Procedures          ED Course  ED Course as of Sep 04 0550   Wed Sep 04, 2019   0412 89-year-old female sent from the nursing home to be evaluated for cough and shortness of breath.  Patient unable to provide any history.  On arrival to the ED, patient nontoxic-appearing with nonlabored breathing and normal oxygen saturations on 2 L.  Moderate risk Well's.  EKG revealed a paced rhythm with a heart rate of 76 with occasional intrinsic beats with market ST depression in precordial leads that appears new when compared to prior EKGs.  [DD]   0525 Labs remarkable for minimally elevated troponin.  Aspirin given.  BNP significantly elevated.  Patient anemic with a hemoglobin of 7.8.  Chest CTA suggestive of pneumonia.  HCAP coverage initiated.  I discussed the patient's case with Dr. Mane, and the patient will be admitted under her care for further evaluation and treatment.  She is hemodynamically stable at this time.  [DD]      ED Course User Index  [DD] Dung Valdez MD         Final Diagnosis: as of Sep 04 0550   HCAP (healthcare-associated pneumonia)   Shortness of breath   Elevated troponin   Anemia, unspecified type          Recent  Results (from the past 24 hour(s))   Light Blue Top    Collection Time: 09/04/19  3:56 AM   Result Value Ref Range    Extra Tube hold for add-on    Gold Top - SST    Collection Time: 09/04/19  3:56 AM   Result Value Ref Range    Extra Tube Hold for add-ons.    Protime-INR    Collection Time: 09/04/19  3:56 AM   Result Value Ref Range    Protime 15.3 (H) 11.2 - 14.3 Seconds    INR 1.27 (H) 0.85 - 1.16   Comprehensive Metabolic Panel    Collection Time: 09/04/19  3:57 AM   Result Value Ref Range    Glucose 91 65 - 99 mg/dL    BUN 33 (H) 8 - 23 mg/dL    Creatinine 1.06 (H) 0.57 - 1.00 mg/dL    Sodium 142 136 - 145 mmol/L    Potassium 4.9 3.5 - 5.2 mmol/L    Chloride 106 98 - 107 mmol/L    CO2 25.0 22.0 - 29.0 mmol/L    Calcium 7.4 (L) 8.2 - 9.6 mg/dL    Total Protein 7.7 6.0 - 8.5 g/dL    Albumin 3.10 (L) 3.50 - 5.20 g/dL    ALT (SGPT) <5 1 - 33 U/L    AST (SGOT) 16 1 - 32 U/L    Alkaline Phosphatase 84 39 - 117 U/L    Total Bilirubin 0.3 0.2 - 1.2 mg/dL    eGFR Non African Amer 48 (L) >60 mL/min/1.73    Globulin 4.6 gm/dL    A/G Ratio 0.7 g/dL    BUN/Creatinine Ratio 31.1 (H) 7.0 - 25.0    Anion Gap 11.0 5.0 - 15.0 mmol/L   BNP    Collection Time: 09/04/19  3:57 AM   Result Value Ref Range    proBNP 7,535.0 (H) 5.0-1,800.0 pg/mL   Troponin    Collection Time: 09/04/19  3:57 AM   Result Value Ref Range    Troponin T 0.033 (C) 0.000 - 0.030 ng/mL   Green Top (Gel)    Collection Time: 09/04/19  3:57 AM   Result Value Ref Range    Extra Tube Hold for add-ons.    Lavender Top    Collection Time: 09/04/19  3:57 AM   Result Value Ref Range    Extra Tube hold for add-on    CBC Auto Differential    Collection Time: 09/04/19  3:57 AM   Result Value Ref Range    WBC 6.12 3.40 - 10.80 10*3/mm3    RBC 2.55 (L) 3.77 - 5.28 10*6/mm3    Hemoglobin 7.8 (L) 12.0 - 15.9 g/dL    Hematocrit 27.3 (L) 34.0 - 46.6 %    .1 (H) 79.0 - 97.0 fL    MCH 30.6 26.6 - 33.0 pg    MCHC 28.6 (L) 31.5 - 35.7 g/dL    RDW 16.4 (H) 12.3 - 15.4 %     RDW-SD 63.2 (H) 37.0 - 54.0 fl    MPV 9.1 6.0 - 12.0 fL    Platelets 181 140 - 450 10*3/mm3    Neutrophil % 78.2 (H) 42.7 - 76.0 %    Lymphocyte % 12.1 (L) 19.6 - 45.3 %    Monocyte % 7.5 5.0 - 12.0 %    Eosinophil % 1.5 0.3 - 6.2 %    Basophil % 0.2 0.0 - 1.5 %    Immature Grans % 0.5 0.0 - 0.5 %    Neutrophils, Absolute 4.79 1.70 - 7.00 10*3/mm3    Lymphocytes, Absolute 0.74 0.70 - 3.10 10*3/mm3    Monocytes, Absolute 0.46 0.10 - 0.90 10*3/mm3    Eosinophils, Absolute 0.09 0.00 - 0.40 10*3/mm3    Basophils, Absolute 0.01 0.00 - 0.20 10*3/mm3    Immature Grans, Absolute 0.03 0.00 - 0.05 10*3/mm3    nRBC 0.0 0.0 - 0.2 /100 WBC   Hepatic Function Panel    Collection Time: 09/04/19  3:57 AM   Result Value Ref Range    Total Protein 7.7 6.0 - 8.5 g/dL    Albumin 3.10 (L) 3.50 - 5.20 g/dL    ALT (SGPT) <5 1 - 33 U/L    AST (SGOT) 16 1 - 32 U/L    Alkaline Phosphatase 84 39 - 117 U/L    Total Bilirubin 0.3 0.2 - 1.2 mg/dL    Bilirubin, Direct <0.2 (L) 0.2 - 0.3 mg/dL    Bilirubin, Indirect     POC CHEM 8    Collection Time: 09/04/19  4:05 AM   Result Value Ref Range    Glucose 86 70 - 130 mg/dL    BUN 31 (H) 8 - 26 mg/dL    Creatinine 1.30 0.60 - 1.30 mg/dL    Sodium 141 138 - 146 mmol/L    Potassium 4.7 3.5 - 4.9 mmol/L    Chloride 107 98 - 109 mmol/L    Total CO2 24 24 - 29 mmol/L    Hemoglobin 7.1 (L) 12.0 - 17.0 g/dL    Hematocrit 21 (L) 38 - 51 %    Ionized Calcium 1.07 (L) 1.20 - 1.32 mmol/L   Lactic Acid, Plasma    Collection Time: 09/04/19  5:31 AM   Result Value Ref Range    Lactate 1.0 0.5 - 2.0 mmol/L     Note: In addition to lab results from this visit, the labs listed above may include labs taken at another facility or during a different encounter within the last 24 hours. Please correlate lab times with ED admission and discharge times for further clarification of the services performed during this visit.    XR Chest 1 View   Final Result   Globular cardiomediastinal silhouette enlargement with severe left  "greater the right alveolar disease. Left-sided pacer defibrillator.      Signer Name: Rosario Sullivan MD    Signed: 9/4/2019 5:15 AM    [id number]     Radiology [facility]      CT Angiogram Chest With Contrast   Final Result   No evidence for pulmonary most.   2. Globular cardiac silhouette enlargement.   3. Bilateral pleural effusions.   4. Extensive alveolar disease asymmetrically worse on the left than the right. In addition to the airspace disease there are multiple bilateral nodular lesions in the lungs. This could be infectious or inflammatory in etiology but metastatic disease is   also in the differential.   5. Partially calcified subcarinal lymph node.   6. No pneumothorax   7. Concern for left adrenal mass partly in the field-of-view.      Signer Name: Rosario Sullivan MD    Signed: 9/4/2019 5:14 AM    [id number]     Radiology [facility]        Vitals:    09/04/19 0400 09/04/19 0500 09/04/19 0619 09/04/19 0637   BP: 153/63 147/62 143/68    BP Location:   Right arm    Patient Position:   Lying    Pulse: 75 74 75 75   Resp:  18 28 26   Temp:   98.3 °F (36.8 °C)    TempSrc:   Oral    SpO2: 92% 94% 93% 93%   Weight:   60.5 kg (133 lb 6.1 oz)    Height:   162.6 cm (64.02\")      Medications   sodium chloride 0.9 % flush 10 mL (not administered)   piperacillin-tazobactam (ZOSYN) 3.375 g in iso-osmotic dextrose 50 ml (premix) (not administered)     And   Pharmacy to dose vancomycin (not administered)   ipratropium-albuterol (DUO-NEB) nebulizer solution 3 mL (3 mL Nebulization Given 9/4/19 0637)   vancomycin (dosing per levels) (not administered)   furosemide (LASIX) injection 40 mg (not administered)   iopamidol (ISOVUE-300) 61 % injection 100 mL (50 mL Intravenous Given 9/4/19 0430)   vancomycin 1250 mg/250 mL 0.9% NS IVPB ([facility]) (1,250 mg Intravenous New Bag 9/4/19 0599)   piperacillin-tazobactam (ZOSYN) 3.375 g in iso-osmotic dextrose 50 ml (premix) (0 g " Intravenous Stopped 9/4/19 0552)   aspirin suppository 300 mg (300 mg Rectal Given 9/4/19 0549)   furosemide (LASIX) injection 40 mg (40 mg Intravenous Given 9/4/19 0647)     ECG/EMG Results (last 24 hours)     Procedure Component Value Units Date/Time    ECG 12 Lead [[id number]] Collected:  09/04/19 0355     Updated:  09/04/19 0649        ECG 12 Lead                     [facility]             Dung Valdez MD  09/04/19 0659      Electronically signed by Dung Valdez MD at 9/4/2019  6:59 AM          Physician Progress Notes (last 72 hours) (Notes from 9/3/2019  4:12 PM through 9/6/2019  4:12 PM)      Chuy Cavazos DO at 9/6/2019  2:39 PM          Palliative Care Progress Note    Date of Admission: 9/4/2019    Subjective: Patient herself has no complaints at this point time.  Nursing staff report episodes of confusion.  Current Code Status     Date Active Code Status Order ID Comments User Context       9/4/2019 06:57 No CPR [id number]  Adela Gomez PA Inpatient       Questions for Current Code Status     Question Answer Comment    Code Status No CPR     Medical Interventions (Level of Support Prior to Arrest) Comfort Measures     Comments per advanced directive on chart         No current facility-administered medications on file prior to encounter.      Current Outpatient Medications on File Prior to Encounter   Medication Sig Dispense Refill   • acetaminophen (TYLENOL) 325 MG tablet Take 650 mg by mouth Every 6 (Six) Hours As Needed for mild pain (1-3).     • bisacodyl (BISAC-EVAC) 10 MG suppository Insert 10 mg into the rectum Daily As Needed for constipation.     • carvedilol (COREG) 12.5 MG tablet Take 12.5 mg by mouth 2 (Two) Times a Day With Meals.     • diclofenac (VOLTAREN) 1 % gel gel Apply 4 g topically to the appropriate area as directed 2 (Two) Times a Day As Needed.     • donepezil (ARICEPT) 5 MG tablet Take 5 mg by mouth Every Night.     • ferrous sulfate 325 (65 FE) MG tablet Take 325  "mg by mouth Daily With Breakfast.     • folic acid (FOLVITE) 1 MG tablet Take 1 mg by mouth Daily.     • furosemide (LASIX) 40 MG tablet Take 1 tablet by mouth 3 (Three) Times a Week. (Patient taking differently: Take 40 mg by mouth 3 (Three) Times a Week. MonWedFri; Hold for SBP <110)     • levothyroxine (SYNTHROID, LEVOTHROID) 50 MCG tablet Take 50 mcg by mouth Daily.     • lisinopril (PRINIVIL,ZESTRIL) 2.5 MG tablet Take 2.5 mg by mouth Daily. Hold if SBP <110     • Magnesium Hydroxide (MILK OF MAGNESIA PO) Take 30 mL by mouth Daily As Needed.     • mirtazapine (REMERON) 7.5 MG half tablet Take 15 mg by mouth Daily.     • nitroglycerin (NITROSTAT) 0.4 MG SL tablet Place 0.4 mg under the tongue Every 5 (Five) Minutes As Needed for Chest Pain. Take no more than 3 doses in 15 minutes.     • sertraline (ZOLOFT) 50 MG tablet Take 50 mg by mouth Daily.          •  acetaminophen  •  diclofenac  •  ipratropium-albuterol  •  sodium chloride    Objective: /60 (BP Location: Right arm, Patient Position: Sitting)   Pulse 78   Temp 97.5 °F (36.4 °C) (Oral)   Resp 18   Ht 162.6 cm (64.02\")   Wt 60.5 kg (133 lb 6.1 oz)   LMP  (LMP Unknown)   SpO2 97%   BMI 22.88 kg/m²       Intake/Output Summary (Last 24 hours) at 9/6/2019 1439  Last data filed at 9/6/2019 0909  Gross per 24 hour   Intake 160 ml   Output 850 ml   Net -690 ml     Physical Exam:      General Appearance:   Awake, pleasantly confused oriented x1 to person   Head:    Normocephalic, without obvious abnormality, atraumatic   Eyes:            Lids and lashes normal, conjunctivae and sclerae normal, no   icterus, no pallor, corneas clear, PERRLA   Ears:    Ears appear intact with no abnormalities noted   Throat:   No oral lesions, no thrush, oral mucosa moist   Neck:   No adenopathy, supple, trachea midline, no thyromegaly, no     carotid bruit, no JVD   Back:     No kyphosis present, no scoliosis present, no skin lesions,       erythema or scars, no " tenderness to percussion or                   palpation,   range of motion normal   Lungs:     Clear to auscultation,respirations regular, even and                   unlabored    Heart:    Regular rhythm and normal rate, normal S1 and S2, no            murmur, no gallop, no rub, no click   Breast Exam:    Deferred   Abdomen:     Normal bowel sounds, no masses, no organomegaly, soft        non-tender, non-distended, no guarding, no rebound                 tenderness   Genitalia:    Deferred   Extremities:   Moves all extremities well, no edema, no cyanosis, no              redness   Pulses:   Pulses palpable and equal bilaterally   Skin:   No bleeding, bruising or rash   Lymph nodes:   No palpable adenopathy   Neurologic:   Cranial nerves 2 - 12 grossly intact, sensation intact, DTR        present and equal bilaterally     Results from last 7 days   Lab Units 19  0712   WBC 10*3/mm3 4.82   HEMOGLOBIN g/dL 7.7*   HEMATOCRIT % 26.0*   PLATELETS 10*3/mm3 154     Results from last 7 days   Lab Units 19  0712  19  0357   SODIUM mmol/L 140   < > 142   POTASSIUM mmol/L 4.0   < > 4.9   CHLORIDE mmol/L 101   < > 106   CO2 mmol/L 27.0   < > 25.0   BUN mg/dL 41*   < > 33*   CREATININE mg/dL 1.39*   < > 1.06*   CALCIUM mg/dL 6.9*   < > 7.4*   BILIRUBIN mg/dL  --   --  0.3  0.3   ALK PHOS U/L  --   --  84  84   ALT (SGPT) U/L  --   --  <5  <5   AST (SGOT) U/L  --   --  16  16   GLUCOSE mg/dL 87   < > 91    < > = values in this interval not displayed.       Impression: CHF  Dyspnea  Dementia  [facility]  Plan: Plan is for the patient be discharged to the nursing facility with hospice.  Hospice case management working on disposition.        Chuy Cavazos DO  19  2:39 PM        Electronically signed by Chuy Cavazos DO at 2019  2:40 PM     Beverly Glasgow MD at 2019 11:30 AM              [facility] Medicine Services  PROGRESS NOTE    Patient Name: Imelda Maldonado  :  4/29/1923  MRN: [id number]    Date of Admission: 9/4/2019  Length of Stay: 1  Primary Care Physician: Leyla Castro DO    Subjective   Subjective     CC:  F/U SOA, hypoxia    HPI:  Patient seen this morning. Eating breakfast. No complaints. Down to 4 liters oxygen.    Review of Systems  CV-no chest pain, no palpitations  Resp-no cough, no dyspnea  GI-no N/V/D, no abd pain    All other systems reviewed and negative except any additional pertinent positives and negatives as discussed in HPI.    Objective   Objective     Vital Signs:   Temp:  [97 °F (36.1 °C)-97.9 °F (36.6 °C)] 97.5 °F (36.4 °C)  Heart Rate:  [74-80] 80  Resp:  [18] 18  BP: ()/(41-60) 112/46        Physical Exam:  Gen-no acute distress, frail appearance  HENT-NCAT, mucous membranes moist  CV-RRR, S1 S2 normal, no m/r/g  Resp-improved breath sounds bilaterally, no wheezes, nonlabored  Abd-soft, NT, ND, +BS  Ext-no edema  Neuro-A&Ox1, answers simple questions, no focal deficits  Skin-no rashes  Psych-appropriate mood    Results Reviewed:    Results from last 7 days   Lab Units 09/06/19 0712 09/05/19 0424 09/04/19  1248 09/04/19  0405 09/04/19  0357 09/04/19  0356   WBC 10*3/mm3 4.82 5.21  --   --  6.12  --    HEMOGLOBIN g/dL 7.7* 8.7*  --   --  7.8*  --    HEMOGLOBIN, POC g/dL  --   --   --  7.1*  --   --    HEMATOCRIT % 26.0* 28.7*  --   --  27.3*  --    HEMATOCRIT POC %  --   --   --  21*  --   --    PLATELETS 10*3/mm3 154 172  --   --  181  --    INR   --   --   --   --   --  1.27*   PROCALCITONIN ng/mL  --   --  0.15  --   --   --      Results from last 7 days   Lab Units 09/06/19 0712 09/05/19  0425 09/04/19  0639 09/04/19  0405 09/04/19  0357   SODIUM mmol/L 140 141  --   --  142   POTASSIUM mmol/L 4.0 4.6  --   --  4.9   CHLORIDE mmol/L 101 104  --   --  106   CO2 mmol/L 27.0 24.0  --   --  25.0   BUN mg/dL 41* 40*  --   --  33*   CREATININE mg/dL 1.39* 1.28*  --  1.30 1.06*   GLUCOSE mg/dL 87 70  --   --  91   CALCIUM mg/dL 6.9*  7.0*  --   --  7.4*   ALT (SGPT) U/L  --   --   --   --  <5  <5   AST (SGOT) U/L  --   --   --   --  16  16   TROPONIN T ng/mL  --   --  0.024  --  0.033*   PROBNP pg/mL  --   --   --   --  7,535.0*     Estimated Creatinine Clearance: 22.6 mL/min (A) (by C-G formula based on SCr of 1.39 mg/dL (H)).    Microbiology Results Abnormal     Procedure Component Value - Date/Time    Blood Culture - Blood, Arm, Left [[id number]] Collected:  09/04/19 0530    Lab Status:  Preliminary result Specimen:  Blood from Arm, Left Updated:  09/06/19 0645     Blood Culture No growth at 2 days    Blood Culture - Blood, Arm, Right [[id number]] Collected:  09/04/19 0500    Lab Status:  Preliminary result Specimen:  Blood from Arm, Right Updated:  09/06/19 0645     Blood Culture No growth at 2 days    MRSA Screen, PCR - Swab, Nares [[id number]]  (Normal) Collected:  09/04/19 1800    Lab Status:  Final result Specimen:  Swab from Nares Updated:  09/1934     MRSA PCR Negative    Narrative:       MRSA Negative          Imaging Results (last 24 hours)     ** No results found for the last 24 hours. **          Results for orders placed during the hospital encounter of 08/19/19   Adult Transthoracic Echo Complete With Contrast if Necessary Per Protocol    Narrative · The left ventricular cavity is moderate-to-severely dilated.  · Left atrial cavity size is moderate-to-severely dilated.  · Moderate aortic valve regurgitation is present.  · Moderate-to-severe mitral valve regurgitation is present.  · Mild tricuspid valve regurgitation is present.  · Mild pulmonic valve regurgitation is present.  · Mild dilation of the aortic root and of the sinuses Valsalva present.  · Left ventricular wall thickness is consistent with mild concentric   hypertrophy.  · Estimated EF = 24%.  · Left ventricular systolic function is severely decreased.  · The following left ventricular wall segments are hypokinetic: basal   anterolateral, basal inferolateral, mid  inferolateral, apical inferior,   mid inferior, basal inferoseptal, basal anterior, basal inferior and basal   inferoseptal. The following left ventricular wall segments are akinetic:   mid anterior, apical anterior, mid anterolateral, apical lateral, apical   septal, mid inferoseptal, apex and mid anteroseptal.  · The findings are consistent with dilated cardiomyopathy.  · Normal right ventricular cavity size and wall thickness with moderately   reduced right ventricular systolic function noted; abnormal septal motion   consistent with RV pacemaker.  · The aortic valve exhibits mild sclerosis.  · No evidence of pulmonary hypertension is present.  · There is a trivial pericardial effusion adjacent to the left ventricle.          I have reviewed the medications:  Scheduled Meds:    carvedilol 12.5 mg Oral BID With Meals   donepezil 5 mg Oral Nightly   ferrous sulfate 325 mg Oral Daily With Breakfast   folic acid 1 mg Oral Daily   furosemide 20 mg Intravenous Q12H   ipratropium-albuterol 3 mL Nebulization 4x Daily - RT   levothyroxine 50 mcg Oral Q AM   lisinopril 2.5 mg Oral Daily   mirtazapine 15 mg Oral Daily   pharmacy consult - MTM  Does not apply Daily   piperacillin-tazobactam 3.375 g Intravenous Q8H   sertraline 50 mg Oral Daily     Continuous Infusions:   PRN Meds:.•  acetaminophen  •  diclofenac  •  ipratropium-albuterol  •  sodium chloride      Assessment/Plan   Assessment / Plan     Active Hospital Problems    Diagnosis  POA   • **Acute on chronic congestive heart failure (CMS/HCC) [I50.9]  Yes   • Acute on chronic anemia [D64.9]  Yes   • HCAP (healthcare-associated pneumonia) [J18.9]  Yes   • Hypothyroidism (acquired) [E03.9]  Yes   • Dementia [F03.90]  Yes   • Elevated troponin [R74.8]  Yes   • Pacemaker [Z95.0]  Yes   • Acute and chronic respiratory failure with hypoxia (CMS/HCC) [J96.21]  Yes   • CKD (chronic kidney disease) stage 3, GFR 30-59 ml/min (CMS/HCC) [N18.3]  Yes   • Idiopathic cardiomyopathy  (CMS/[facility]) [I42.8]  Yes   • Hyperlipidemia [E78.5]  Yes   • Essential hypertension [I10]  Yes      Resolved Hospital Problems   No resolved problems to display.        Brief Hospital Course to date:  Imelda Maldonado is a 89 y.o. female with hx of dementia, CAD, chronic systolic CHF (EF 25%) s/p BiV-ICD, HTN, HLD, remote DVT, GI bleed in 2013, hx of C.diff, and Afib (not anticoagulated due to hx of GI bleed) who presents due to SOA and hypoxia. Recent hospitalization 8/19-8/21 due to CHF exacerbation. CTA chest in the ER showed no PE but moderate bilateral pleural effusions, alveolar disease worse on the left with near complete consolidation of the left lower lobe. proBNP elevated at 7535 with mild elevation in troponin 0.033.  Admitted for treatment of CHF and HCAP.     PLAN:  --Continue Zosyn for now. ID following. Blood cultures NGTD.  --Appreciate Cardiology seeing patient. Dr. Cardozo has nothing else to add. Recommends hospice for end stage heart failure.  --Anemia improved s/p 1 unit PRBC on admission. No signs of acute blood loss. Monitor.   --Discussed with daughter Earlene by phone (lives out of state) on 9/5. Agreeable to Palliative and Hospice consults. Will continue with ATBx and IV Lasix in the mean time, attempt to wean oxygen. Likely will go back to facility with Hospice.    DVT Prophylaxis:  mechanical    Disposition: I expect the patient to be discharged likely back to [facility] rehab with hospice in ~1 day    CODE STATUS:   Code Status and Medical Interventions:   Ordered at: 09/04/19 0657     Code Status:    No CPR     Medical Interventions (Level of Support Prior to Arrest):    Comfort Measures     Comments:    per advanced directive on chart         Electronically signed by Beverly Glasgow MD, 09/06/19, 11:33 AM.        Electronically signed by Beverly Glasgow MD at 9/6/2019 11:34 AM     Didier Del Real MD at 9/6/2019 10:43 AM          Infectious Disease Progress Note  Imelda BARRERA  Erica  4/29/1923  [id number]    Date of Consult: 9/4/2019  Admission Date: 9/4/2019    Requesting Provider: Beverly GUZMAN MD  Evaluating Physician: Didier Del Real MD    Chief Complaint: shortness of breath, cough    Reason for Consultation: possible pneumonia    History of present illness:   Patient is a 89 y.o.  Yr old female with history of idiopathic cardiomyopathy (EF 20%), osteoarthritis, CKD, DVT, gout, pacemaker/ICD, dementia. Recent admission from 8/19 to 8/21 with heart failure, tx with diuresis.    Presented back to [facility] ED on 9/4 from SNF due to concerns of of worsening shortness of breath and productive cough. Afebrile.  Unfortunately she is not oriented to place or time and is unable to provide any additional history. She is currently on 3 L O2. BP stable. WBC 6. Blood cx pending. CTA concerning for left sided pnuemonia, Started on vancomycin and zosyn. BNP is 7535. Lactate 1.0. ID consulted for assistance with antibiotics. Currently resting comfortably. RN has not appreciated any cough. Patient complains of chronic chest pain.     9/5: Afebrile overnight.  Overall clinically stable.  She is on 5 L of oxygen.  Net negative about 800 mL's.  Tolerating Zosyn.  She complains of mild cough but no chest pain.  She is hungry and would like to eat breakfast    9/6: palliative consulted, planning to transition to hospice care at SNF. Stable clinically, fatigue, afebrile. Mild wet sounding cough on 4 L O2    Review of Systems  She is not oriented to place or time.  No chest pain. Otherwise ROS limited based on memory loss    No Known Allergies    Antibiotics:  Anti-Infectives (From admission, onward)    Ordered     Dose/Rate Route Frequency Start Stop    09/04/19 2005  piperacillin-tazobactam (ZOSYN) 3.375 g in iso-osmotic dextrose 50 ml (premix)     Ordering Provider:  Adela Gomez PA    3.375 g  over 4 Hours Intravenous Every 8 Hours 09/05/19 0000 09/09/19 0759    09/04/19 0522  vancomycin 1250  mg/250 mL 0.9% NS IVPB ([facility])     Ordering Provider:  Dung Valdez MD    20 mg/kg × 60.5 kg Intravenous Once 09/04/19 0524 09/04/19 0950    09/04/19 0522  piperacillin-tazobactam (ZOSYN) 3.375 g in iso-osmotic dextrose 50 ml (premix)     Ordering Provider:  Dung Valdez MD    3.375 g Intravenous Once 09/04/19 0524 09/04/19 0552          Other Medications:  Current Facility-Administered Medications   Medication Dose Route Frequency Provider Last Rate Last Dose   • acetaminophen (TYLENOL) tablet 650 mg  650 mg Oral Q6H PRN Beverly Glasgow MD   650 mg at 09/06/19 0120   • carvedilol (COREG) tablet 12.5 mg  12.5 mg Oral BID With Meals Beverly Glasgow MD   12.5 mg at 09/06/19 0909   • diclofenac (VOLTAREN) 1 % gel 4 g  4 g Topical BID PRN Beverly Glasgow MD       • donepezil (ARICEPT) tablet 5 mg  5 mg Oral Nightly Beverly Glasgow MD   5 mg at 09/05/19 2004   • ferrous sulfate tablet 325 mg  325 mg Oral Daily With Breakfast Beverly Glasgow MD   325 mg at 09/06/19 0909   • folic acid (FOLVITE) tablet 1 mg  1 mg Oral Daily Beverly Glasgow MD   1 mg at 09/06/19 0910   • furosemide (LASIX) injection 20 mg  20 mg Intravenous Q12H Beverly Glasgow MD   20 mg at 09/06/19 0505   • ipratropium-albuterol (DUO-NEB) nebulizer solution 3 mL  3 mL Nebulization Q4H PRN Adela Gomez PA   3 mL at 09/05/19 0038   • ipratropium-albuterol (DUO-NEB) nebulizer solution 3 mL  3 mL Nebulization 4x Daily - RT Beverly Glasgow MD   3 mL at 09/06/19 0924   • levothyroxine (SYNTHROID, LEVOTHROID) tablet 50 mcg  50 mcg Oral Q AM Beverly Glasgow MD   50 mcg at 09/06/19 0506   • lisinopril (PRINIVIL,ZESTRIL) tablet 2.5 mg  2.5 mg Oral Daily Beverly Glasgow MD   2.5 mg at 09/06/19 0910   • mirtazapine (REMERON) tablet 15 mg  15 mg Oral Daily Beverly Glasgow MD   15 mg at 09/06/19 0910   • Pharmacy Consult - MTM   Does not apply Daily [facility]   Stopped at 09/04/19 3255   • piperacillin-tazobactam (ZOSYN) 3.375  "g in iso-osmotic dextrose 50 ml (premix)  3.375 g Intravenous Q8H Adela Gomez PA   3.375 g at 09/06/19 0909   • sertraline (ZOLOFT) tablet 50 mg  50 mg Oral Daily Beverly Glasgow MD   50 mg at 09/06/19 0910   • sodium chloride 0.9 % flush 10 mL  10 mL Intravenous PRN Dung Valdez MD   10 mL at 09/04/19 0951       Physical Exam:   Vital Signs   /46   Pulse 80   Temp 97.5 °F (36.4 °C) (Oral)   Resp 18   Ht 162.6 cm (64.02\")   Wt 60.5 kg (133 lb 6.1 oz)   LMP  (LMP Unknown)   SpO2 95%   BMI 22.88 kg/m²      GENERAL: fatigue but easily arousible, but not oriented.   no acute distress.   HEENT: Normocephalic, atraumatic.  PERRL. EOMI. No conjunctival injection. No icterus.   HEART: RRR; No murmur.  LUNGS: Lungs sounds diminished at right base. No rales. Wet sounding cough appreciated.    ABDOMEN: Soft, nontender, nondistended. Positive bowel sounds. No rebound or guarding.    : no gramajo  EXT:  Chronic joint deformities on both hands, but no cyanosis, clubbing or significant edema.  MSK: FROM without joint effusions noted arms/legs.    SKIN: Warm and dry without cutaneous eruptions on Inspection/palpation.    NEURO: Oriented to name but not place or time. No focal deficits on motor/sensory exam at arms/legs.  PSYCHIATRIC: dementia, memory loss    Laboratory Data    Results from last 7 days   Lab Units 09/06/19  0712 09/05/19  0424 09/04/19  0405 09/04/19  0357   WBC 10*3/mm3 4.82 5.21  --  6.12   HEMOGLOBIN g/dL 7.7* 8.7*  --  7.8*   HEMOGLOBIN, POC g/dL  --   --  7.1*  --    HEMATOCRIT % 26.0* 28.7*  --  27.3*   HEMATOCRIT POC %  --   --  21*  --    PLATELETS 10*3/mm3 154 172  --  181     Results from last 7 days   Lab Units 09/06/19  0712   SODIUM mmol/L 140   POTASSIUM mmol/L 4.0   CHLORIDE mmol/L 101   CO2 mmol/L 27.0   BUN mg/dL 41*   CREATININE mg/dL 1.39*   GLUCOSE mg/dL 87   CALCIUM mg/dL 6.9*     Estimated Creatinine Clearance: 22.6 mL/min (A) (by C-G formula based on SCr of 1.39 " mg/dL (H)).  Results from last 7 days   Lab Units 09/04/19  0357   ALK PHOS U/L 84  84   BILIRUBIN mg/dL 0.3  0.3   BILIRUBIN DIRECT mg/dL <0.2*   ALT (SGPT) U/L <5  <5   AST (SGOT) U/L 16  16               Microbiology:    9/4 blood cx pending, NGTD   MRSA screen negative  sputum cx not collected    Radiology:  Xr Chest 1 View    Result Date: 9/5/2019  1. Mild improvement on comparison earlier film. Continued follow-up recommended. Signer Name: Rosario Sullivan MD  Signed: 9/5/2019 12:25 AM  Workstation Name: [facility]  Radiology [facility]    Xr Chest 1 View    Result Date: 9/4/2019  Globular cardiomediastinal silhouette enlargement with severe left greater the right alveolar disease. Left-sided pacer defibrillator. Signer Name: Rosario Sullivan MD  Signed: 9/4/2019 5:15 AM  Workstation Name: [facility]  Radiology [facility]    Ct Angiogram Chest With Contrast    Result Date: 9/4/2019  No evidence for pulmonary most. 2. Globular cardiac silhouette enlargement. 3. Bilateral pleural effusions. 4. Extensive alveolar disease asymmetrically worse on the left than the right. In addition to the airspace disease there are multiple bilateral nodular lesions in the lungs. This could be infectious or inflammatory in etiology but metastatic disease is also in the differential. 5. Partially calcified subcarinal lymph node. 6. No pneumothorax 7. Concern for left adrenal mass partly in the field-of-view. Signer Name: Rosario Sullivan MD  Signed: 9/4/2019 5:14 AM  Workstation Name: [facility]  Radiology [facility]     I personally reviewed the above imaging studies.      Impression:   1. Acute on chronic hypoxic respiratory failure: likely close to baseline, currently comfortable on 4 L.   2. Consolidation of LLL and DEJUAN on CT concerning for pneumonia:   3. Right pleural effusion  4. Acute on chronic systolic heart failure  5. Idiopathic cardiomyopathy (last EF 20%)  6. Dementia  7. Pacemaker,  ICD  8. CKD stage 4  9. Hx of DVT  10. Hypothyroid    Palliative care consulted: planning to transition to hospice care at SNF which is appropriate. Likely symptoms mostly due to CHF with possible HCAP. Continue antibiotics until discharge, then stop.     PLAN:    - f/u pending blood cx, so far negative    - continue empiric zosyn, renally dosed, until discharge to hospice care, then stop    - diuresis and other supportive care per primary team    Call if any other acute concerns prior to discharge    Didier Del Real MD  2019      Electronically signed by Didier Del Real MD at 2019 10:47 AM     Beverly Glasgow MD at 2019  1:59 PM              [facility] Medicine Services  PROGRESS NOTE    Patient Name: Imelda Maldonado  : 1923  MRN: [id number]    Date of Admission: 2019  Length of Stay: 1  Primary Care Physician: Leyla Castro DO    Subjective   Subjective     CC:  F/U SOA, hypoxia    HPI:  Patient seen this morning. No complaints, says she feels better. Slept well last night.     Review of Systems  CV-no chest pain, no palpitations  Resp-no cough, no dyspnea  GI-no N/V/D, no abd pain    All other systems reviewed and negative except any additional pertinent positives and negatives as discussed in HPI.    Objective   Objective     Vital Signs:   Temp:  [97 °F (36.1 °C)-98.1 °F (36.7 °C)] 97.9 °F (36.6 °C)  Heart Rate:  [74-81] 74  Resp:  [17-24] 18  BP: ()/(46-59) 98/59        Physical Exam:  Gen-no acute distress, frail appearance  HENT-NCAT, mucous membranes moist  CV-RRR, S1 S2 normal, no m/r/g  Resp-coarse breath sounds bilaterally, no wheezes, nonlabored on HFNC  Abd-soft, NT, ND, +BS  Ext-no edema  Neuro-A&Ox1, answers simple questions, no focal deficits  Skin-no rashes  Psych-appropriate mood    Results Reviewed:    Results from last 7 days   Lab Units 19  0424 19  1248 19  0405 19  0357 19  0356   WBC 10*3/mm3  5.21  --   --  6.12  --    HEMOGLOBIN g/dL 8.7*  --   --  7.8*  --    HEMOGLOBIN, POC g/dL  --   --  7.1*  --   --    HEMATOCRIT % 28.7*  --   --  27.3*  --    HEMATOCRIT POC %  --   --  21*  --   --    PLATELETS 10*3/mm3 172  --   --  181  --    INR   --   --   --   --  1.27*   PROCALCITONIN ng/mL  --  0.15  --   --   --      Results from last 7 days   Lab Units 09/05/19  0425 09/04/19  0639 09/04/19  0405 09/04/19  0357   SODIUM mmol/L 141  --   --  142   POTASSIUM mmol/L 4.6  --   --  4.9   CHLORIDE mmol/L 104  --   --  106   CO2 mmol/L 24.0  --   --  25.0   BUN mg/dL 40*  --   --  33*   CREATININE mg/dL 1.28*  --  1.30 1.06*   GLUCOSE mg/dL 70  --   --  91   CALCIUM mg/dL 7.0*  --   --  7.4*   ALT (SGPT) U/L  --   --   --  <5  <5   AST (SGOT) U/L  --   --   --  16  16   TROPONIN T ng/mL  --  0.024  --  0.033*   PROBNP pg/mL  --   --   --  7,535.0*     Estimated Creatinine Clearance: 24.6 mL/min (A) (by C-G formula based on SCr of 1.28 mg/dL (H)).    Microbiology Results Abnormal     Procedure Component Value - Date/Time    Blood Culture - Blood, Arm, Left [[id number]] Collected:  09/04/19 0530    Lab Status:  Preliminary result Specimen:  Blood from Arm, Left Updated:  09/05/19 0645     Blood Culture No growth at 24 hours    Blood Culture - Blood, Arm, Right [[id number]] Collected:  09/04/19 0500    Lab Status:  Preliminary result Specimen:  Blood from Arm, Right Updated:  09/05/19 0645     Blood Culture No growth at 24 hours    MRSA Screen, PCR - Swab, Nares [[id number]]  (Normal) Collected:  09/04/19 1800    Lab Status:  Final result Specimen:  Swab from Nares Updated:  09/1934     MRSA PCR Negative    Narrative:       MRSA Negative          Imaging Results (last 24 hours)     Procedure Component Value Units Date/Time    XR Chest 1 View [[id number]] Collected:  09/05/19 0025     Updated:  09/05/19 0027    Narrative:       Chest x-ray portable    Suspected fluid overload. History of hypertension and  CHF.    Single frontal portable view of the chest timed 1150 on 9/4/2019 and compared to 4:35 AM same day.    FINDINGS: Globular cardiac silhouette enlargement with left-sided pacer again seen. Continued interstitial edema and airspace disease predominantly centrally and asymmetrically worse on the left with left pleural fluid not excluded. On comparison to  prior, mild improvement in airspace disease and volume status. No pneumothorax.      Impression:       1. Mild improvement on comparison earlier film. Continued follow-up recommended.    Signer Name: Rosario Sullivan MD   Signed: 9/5/2019 12:25 AM   Workstation Name: [email or web address]    [facility]          Results for orders placed during the hospital encounter of 08/19/19   Adult Transthoracic Echo Complete With Contrast if Necessary Per Protocol    Narrative · The left ventricular cavity is moderate-to-severely dilated.  · Left atrial cavity size is moderate-to-severely dilated.  · Moderate aortic valve regurgitation is present.  · Moderate-to-severe mitral valve regurgitation is present.  · Mild tricuspid valve regurgitation is present.  · Mild pulmonic valve regurgitation is present.  · Mild dilation of the aortic root and of the sinuses Valsalva present.  · Left ventricular wall thickness is consistent with mild concentric   hypertrophy.  · Estimated EF = 24%.  · Left ventricular systolic function is severely decreased.  · The following left ventricular wall segments are hypokinetic: basal   anterolateral, basal inferolateral, mid inferolateral, apical inferior,   mid inferior, basal inferoseptal, basal anterior, basal inferior and basal   inferoseptal. The following left ventricular wall segments are akinetic:   mid anterior, apical anterior, mid anterolateral, apical lateral, apical   septal, mid inferoseptal, apex and mid anteroseptal.  · The findings are consistent with dilated cardiomyopathy.  · Normal right ventricular cavity size and  wall thickness with moderately   reduced right ventricular systolic function noted; abnormal septal motion   consistent with RV pacemaker.  · The aortic valve exhibits mild sclerosis.  · No evidence of pulmonary hypertension is present.  · There is a trivial pericardial effusion adjacent to the left ventricle.          I have reviewed the medications:  Scheduled Meds:  carvedilol 12.5 mg Oral BID With Meals   donepezil 5 mg Oral Nightly   ferrous sulfate 325 mg Oral Daily With Breakfast   folic acid 1 mg Oral Daily   furosemide 20 mg Intravenous Q12H   ipratropium-albuterol 3 mL Nebulization 4x Daily - RT   levothyroxine 50 mcg Oral Q AM   lisinopril 2.5 mg Oral Daily   mirtazapine 15 mg Oral Daily   pharmacy consult - MTM  Does not apply Daily   piperacillin-tazobactam 3.375 g Intravenous Q8H   sertraline 50 mg Oral Daily     Continuous Infusions:   PRN Meds:.•  acetaminophen  •  diclofenac  •  ipratropium-albuterol  •  sodium chloride      Assessment/Plan   Assessment / Plan     Active Hospital Problems    Diagnosis  POA   • **Acute on chronic congestive heart failure (CMS/HCC) [I50.9]  Yes   • Acute on chronic anemia [D64.9]  Yes   • HCAP (healthcare-associated pneumonia) [J18.9]  Yes   • Hypothyroidism (acquired) [E03.9]  Yes   • Dementia [F03.90]  Yes   • Elevated troponin [R74.8]  Yes   • Pacemaker [Z95.0]  Yes   • Acute and chronic respiratory failure with hypoxia (CMS/HCC) [J96.21]  Yes   • CKD (chronic kidney disease) stage 3, GFR 30-59 ml/min (CMS/HCC) [N18.3]  Yes   • Idiopathic cardiomyopathy (CMS/HCC) [I42.8]  Yes   • Hyperlipidemia [E78.5]  Yes   • Essential hypertension [I10]  Yes      Resolved Hospital Problems   No resolved problems to display.        Brief Hospital Course to date:  Imelda Maldonado is a 89 y.o. female with hx of dementia, CAD, chronic systolic CHF (EF 25%) s/p BiV-ICD, HTN, HLD, remote DVT, GI bleed in 2013, hx of C.diff, and Afib (not anticoagulated due to hx of GI bleed) who  presents due to SOA and hypoxia. Recent hospitalization 8/19-8/21 due to CHF exacerbation. CTA chest in the ER showed no PE but moderate bilateral pleural effusions, alveolar disease worse on the left with near complete consolidation of the left lower lobe. proBNP elevated at 7535 with mild elevation in troponin 0.033.  Admitted for treatment of CHF and HCAP.     PLAN:  --Continue Zosyn for now. ID following. Blood cultures NGTD.  --Appreciate Cardiology seeing patient. Dr. Cardozo has nothing else to add. Recommends hospice for end stage heart failure.  --Anemia improved s/p 1 unit PRBC on admission. No signs of acute blood loss. Monitor.   --Discussed with daughter Earlene by phone ([place]ives [place] of [place]). Agreeable to Palliative and Hospice consults. Will continue with ATBx and IV Lasix in the mean time, attempt to wean oxygen.     DVT Prophylaxis:  mechanical    Disposition: I expect the patient to be discharged TBD, [facility]?    CODE STATUS:   Code Status and Medical Interventions:   Ordered at: 09/04/19 0657     Code Status:    No CPR     Medical Interventions (Level of Support Prior to Arrest):    Comfort Measures     Comments:    per advanced directive on chart         Electronically signed by Beverly Glasgow MD, 09/05/19, 2:07 PM.        Electronically signed by Beverly Glasgow MD at 9/5/2019  2:07 PM     Didier Del Real MD at 9/5/2019 10:35 AM          Infectious Disease Progress Note  Imelda Maldonado  4/29/1923  [id number]    Date of Consult: 9/4/2019  Admission Date: 9/4/2019    Requesting Provider: Beverly GUZMAN MD  Evaluating Physician: Didier Del Real MD    Chief Complaint: shortness of breath, cough    Reason for Consultation: possible pneumonia    History of present illness:   Patient is a 89 y.o.  Yr old female with history of idiopathic cardiomyopathy (EF 20%), osteoarthritis, CKD, DVT, gout, pacemaker/ICD, dementia. Recent admission from 8/19 to 8/21 with  heart failure, tx with diuresis.    Presented back to [facility] ED on 9/4 from SNF due to concerns of of worsening shortness of breath and productive cough. Afebrile.  Unfortunately she is not oriented to place or time and is unable to provide any additional history. She is currently on 3 L O2. BP stable. WBC 6. Blood cx pending. CTA concerning for left sided pnuemonia, Started on vancomycin and zosyn. BNP is 7535. Lactate 1.0. ID consulted for assistance with antibiotics. Currently resting comfortably. RN has not appreciated any cough. Patient complains of chronic chest pain.     9/5: Afebrile overnight.  Overall clinically stable.  She is on 5 L of oxygen.  Net negative about 800 mL's.  Tolerating Zosyn.  She complains of mild cough but no chest pain.  She is hungry and would like to eat breakfast    Review of Systems  She is not oriented to place or time.  She denies having any further pain.  Otherwise ROS limited based on memory loss    No Known Allergies    Antibiotics:  Anti-Infectives (From admission, onward)    Ordered     Dose/Rate Route Frequency Start Stop    09/04/19 2005  piperacillin-tazobactam (ZOSYN) 3.375 g in iso-osmotic dextrose 50 ml (premix)     Ordering Provider:  Adela Gomez PA    3.375 g  over 4 Hours Intravenous Every 8 Hours 09/05/19 0000 09/09/19 0759    09/04/19 0522  vancomycin 1250 mg/250 mL 0.9% NS IVPB ([facility])     Ordering Provider:  Dung Valdez MD    20 mg/kg × 60.5 kg Intravenous Once 09/04/19 0524 09/04/19 0950    09/04/19 0522  piperacillin-tazobactam (ZOSYN) 3.375 g in iso-osmotic dextrose 50 ml (premix)     Ordering Provider:  Dung Valdez MD    3.375 g Intravenous Once 09/04/19 0524 09/04/19 0552          Other Medications:  Current Facility-Administered Medications   Medication Dose Route Frequency Provider Last Rate Last Dose   • acetaminophen (TYLENOL) tablet 650 mg  650 mg Oral Q6H PRN Beverly Glasgow MD   650 mg at 09/04/19 1758   • carvedilol (COREG) tablet  "12.5 mg  12.5 mg Oral BID With Meals Beverly Glasgow MD   12.5 mg at 09/05/19 0852   • diclofenac (VOLTAREN) 1 % gel 4 g  4 g Topical BID PRN Beverly Glasgow MD       • donepezil (ARICEPT) tablet 5 mg  5 mg Oral Nightly Beverly Glasgow MD   5 mg at 09/04/19 2124   • ferrous sulfate tablet 325 mg  325 mg Oral Daily With Breakfast Beverly Glasgow MD   325 mg at 09/05/19 0852   • folic acid (FOLVITE) tablet 1 mg  1 mg Oral Daily Beverly Glasgow MD   1 mg at 09/05/19 0852   • ipratropium-albuterol (DUO-NEB) nebulizer solution 3 mL  3 mL Nebulization Q4H PRN Adela Gomez PA   3 mL at 09/05/19 0038   • ipratropium-albuterol (DUO-NEB) nebulizer solution 3 mL  3 mL Nebulization 4x Daily - RT Beverly Glasgow MD       • levothyroxine (SYNTHROID, LEVOTHROID) tablet 50 mcg  50 mcg Oral Q AM Beverly Glasgow MD   50 mcg at 09/05/19 0522   • lisinopril (PRINIVIL,ZESTRIL) tablet 2.5 mg  2.5 mg Oral Daily Beverly Glasgow MD   2.5 mg at 09/05/19 0852   • mirtazapine (REMERON) tablet 15 mg  15 mg Oral Daily Beverly Glasgow MD   15 mg at 09/05/19 0851   • Pharmacy Consult - MTM   Does not apply Daily [facility]   Stopped at 09/04/19 1514   • piperacillin-tazobactam (ZOSYN) 3.375 g in iso-osmotic dextrose 50 ml (premix)  3.375 g Intravenous Q8H Adela Gomez PA   3.375 g at 09/05/19 0900   • sertraline (ZOLOFT) tablet 50 mg  50 mg Oral Daily Beverly Glasgow MD   50 mg at 09/05/19 0852   • sodium chloride 0.9 % flush 10 mL  10 mL Intravenous PRN Dung Valdez MD   10 mL at 09/04/19 0951       Physical Exam:   Vital Signs   /48 (BP Location: Left arm, Patient Position: Lying)   Pulse 76   Temp 97.4 °F (36.3 °C) (Oral)   Resp 18   Ht 162.6 cm (64.02\")   Wt 60.5 kg (133 lb 6.1 oz)   LMP  (LMP Unknown)   SpO2 (!) 89%   BMI 22.88 kg/m²      GENERAL: Awake and alert, but not oriented. Elderly but in no acute distress.   HEENT: Normocephalic, atraumatic.  PERRL. EOMI. No conjunctival injection. " No icterus.   NECK: Supple without nuchal rigidity. Mild JVD appreciated  HEART: RRR; No murmur.  LUNGS: Rales throughout the left lung fields, but improved  Diminished at right base.  Dry cough appreciated  ABDOMEN: Soft, nontender, nondistended. Positive bowel sounds. No rebound or guarding. No CVA tenderness  : no gramajo  EXT:  Chronic joint deformities on both hands, but no cyanosis, clubbing or significant edema.  MSK: FROM without joint effusions noted arms/legs.    SKIN: Warm and dry without cutaneous eruptions on Inspection/palpation.    NEURO: Oriented to name but not place or time. No focal deficits on motor/sensory exam at arms/legs.  PSYCHIATRIC: dementia, memory loss    Laboratory Data    Results from last 7 days   Lab Units 09/05/19  0424 09/04/19  0405 09/04/19  0357   WBC 10*3/mm3 5.21  --  6.12   HEMOGLOBIN g/dL 8.7*  --  7.8*   HEMOGLOBIN, POC g/dL  --  7.1*  --    HEMATOCRIT % 28.7*  --  27.3*   HEMATOCRIT POC %  --  21*  --    PLATELETS 10*3/mm3 172  --  181     Results from last 7 days   Lab Units 09/05/19  0425   SODIUM mmol/L 141   POTASSIUM mmol/L 4.6   CHLORIDE mmol/L 104   CO2 mmol/L 24.0   BUN mg/dL 40*   CREATININE mg/dL 1.28*   GLUCOSE mg/dL 70   CALCIUM mg/dL 7.0*     Estimated Creatinine Clearance: 24.6 mL/min (A) (by C-G formula based on SCr of 1.28 mg/dL (H)).  Results from last 7 days   Lab Units 09/04/19  0357   ALK PHOS U/L 84  84   BILIRUBIN mg/dL 0.3  0.3   BILIRUBIN DIRECT mg/dL <0.2*   ALT (SGPT) U/L <5  <5   AST (SGOT) U/L 16  16               Microbiology:    9/4 blood cx pending, NGTD   MRSA screen negative  sputum cx not collected    Radiology:  Xr Chest 1 View    Result Date: 9/5/2019  1. Mild improvement on comparison earlier film. Continued follow-up recommended. Signer Name: Rosario Sullivan MD  Signed: 9/5/2019 12:25 AM  Workstation Name: [facility]  [facility]    Xr Chest 1 View    Result Date: 9/4/2019  Globular cardiomediastinal silhouette  enlargement with severe left greater the right alveolar disease. Left-sided pacer defibrillator. Signer Name: Rosario Sullivan MD  Signed: 9/4/2019 5:15 AM  Workstation Name: [facility]  [facility]    Ct Angiogram Chest With Contrast    Result Date: 9/4/2019  No evidence for pulmonary most. 2. Globular cardiac silhouette enlargement. 3. Bilateral pleural effusions. 4. Extensive alveolar disease asymmetrically worse on the left than the right. In addition to the airspace disease there are multiple bilateral nodular lesions in the lungs. This could be infectious or inflammatory in etiology but metastatic disease is also in the differential. 5. Partially calcified subcarinal lymph node. 6. No pneumothorax 7. Concern for left adrenal mass partly in the field-of-view. Signer Name: Rosario Sullivan MD  Signed: 9/4/2019 5:14 AM  Workstation Name: [email or web address]  [facility]     I personally reviewed the above imaging studies.      Impression:   11. Acute on chronic hypoxic respiratory failure: unknown baseline, currently comfortable on 5 L.   12. Consolidation of LLL and DEJUAN on CT concerning for pneumonia:   13. Right pleural effusion  14. Acute on chronic systolic heart failure  15. Idiopathic cardiomyopathy (last EF 20%)  16. Dementia  17. Pacemaker, ICD  18. CKD stage 4  19. Hx of DVT  20. Hypothyroid    At this time is unclear to me how much worse than her baseline she truly is. Comfortable on 4-5 L. Exam somewhat improved today after starting diuresis and antibiotics.  Mild dry cough.  Dr. Cardozo evaluated and recommends palliative care consult based on end-stage heart failure.  Due to extensive findings on imaging concerning for pneumonia we will continue empiric Zosyn for now, however she is nontoxic-appearing and has normal WBC, lactic acid, and procalcitonin suggesting against infection, although she is certainly high risk for healthcare associated pneumonia given her recent  admissions and residence in a skilled nursing facility. Also high risk for aspiration. She likely has combination of both pneumonia and acute heart failure and will treat with empiric antibiotics as well as diuresis.    PLAN: Thank you for asking us to see Imelda Maldonado, I recommend the following:   - f/u pending blood cx, so far negative  - sputum culture if cough becomes productive  - trend CBC, BMP    - off vancomycin  - continue empiric zosyn, renally dosed, pending clinical improvement    - diuresis and other supportive care per primary team  - agree that palliative care consult is warranted    Discussed with Dr Glasgow. I will follow. Call with questions.     Didier Del Real MD  9/5/2019      Electronically signed by Didier Del Real MD at 9/5/2019 10:41 AM          Consult Notes (last 72 hours) (Notes from 9/3/2019  4:12 PM through 9/6/2019  4:12 PM)      Chuy Cavazos DO at 9/5/2019  2:53 PM      Consult Orders    1. Inpatient Palliative Care MD Consult [[id number]] ordered by Beverly Glasgow MD at 09/05/19 1359                Leyla Castro DO  Consulting physician: Pee    Chief Complaint   Patient presents with   • Shortness of Breath         HPI: Patient is a 89-year-old female brought in hospital due to dyspnea.  Patient has been found to have end-stage cardiac disease.  Patient does have a history of dementia and apparently lives in a nursing facility.  Patient himself is a poor historian so most of the HPI comes from chart review.      Past Medical History:   Diagnosis Date   • Acute GI bleeding     12/09/2013 - 12/24/2013. Status post 7 units of packed red blood cells. Esophagogastroduodenoscopy (no active bleed) and colonoscopy (active bleeding from flat cecal polyps and two ascending colonic polyps) suggestive of non-steroidal anti-inflammatory drug colopathy or mild ischemia. Positive for Clostridium difficile status post 14 days of antibiotics.     • Arthritis     reinitiation of  celebrex, October 2013   • CRI (chronic renal insufficiency)     mild   • DVT (deep venous thrombosis) (CMS/HCC)     RLE   • Gout    • Histoplasmosis    • Hyperlipidemia    • Hypertension    • Hypotension     symptoms of dizziness   • Idiopathic cardiomyopathy (CMS/HCC)      Past Surgical History:   Procedure Laterality Date   • APPENDECTOMY     • HYSTERECTOMY     • PACEMAKER IMPLANTATION      change out on 10/30/15 without complication   • POLYPECTOMY     • THYROIDECTOMY      chronic replacement therapy     Current Code Status     Date Active Code Status Order ID Comments User Context       9/4/2019 06:57 No CPR [id number]  Adela Gomez PA Inpatient       Questions for Current Code Status     Question Answer Comment    Code Status No CPR     Medical Interventions (Level of Support Prior to Arrest) Comfort Measures     Comments per advanced directive on chart         Current Facility-Administered Medications   Medication Dose Route Frequency Provider Last Rate Last Dose   • acetaminophen (TYLENOL) tablet 650 mg  650 mg Oral Q6H PRN Beverly Glasgow MD   650 mg at 09/04/19 1758   • carvedilol (COREG) tablet 12.5 mg  12.5 mg Oral BID With Meals Beverly Glasgow MD   12.5 mg at 09/05/19 0852   • diclofenac (VOLTAREN) 1 % gel 4 g  4 g Topical BID PRN Beverly Glasgow MD       • donepezil (ARICEPT) tablet 5 mg  5 mg Oral Nightly Beverly Glasgow MD   5 mg at 09/04/19 2124   • ferrous sulfate tablet 325 mg  325 mg Oral Daily With Breakfast Beverly Glasgow MD   325 mg at 09/05/19 0852   • folic acid (FOLVITE) tablet 1 mg  1 mg Oral Daily Beverly Glasgow MD   1 mg at 09/05/19 0852   • furosemide (LASIX) injection 20 mg  20 mg Intravenous Q12H Beverly Glasgow MD       • ipratropium-albuterol (DUO-NEB) nebulizer solution 3 mL  3 mL Nebulization Q4H PRN Adela Gomez PA   3 mL at 09/05/19 0038   • ipratropium-albuterol (DUO-NEB) nebulizer solution 3 mL  3 mL Nebulization 4x Daily - RT Beverly Glasgow MD   3 mL at  "09/05/19 1200   • levothyroxine (SYNTHROID, LEVOTHROID) tablet 50 mcg  50 mcg Oral Q AM Beverly Glasgow MD   50 mcg at 09/05/19 0522   • lisinopril (PRINIVIL,ZESTRIL) tablet 2.5 mg  2.5 mg Oral Daily Beverly Glasgow MD   2.5 mg at 09/05/19 0852   • mirtazapine (REMERON) tablet 15 mg  15 mg Oral Daily Beverly Glasgow MD   15 mg at 09/05/19 0851   • Pharmacy Consult - MTM   Does not apply Daily Scott De La Rosa, [facility]   Stopped at 09/04/19 1514   • piperacillin-tazobactam (ZOSYN) 3.375 g in iso-osmotic dextrose 50 ml (premix)  3.375 g Intravenous Q8H Adela Gomez PA   3.375 g at 09/05/19 0900   • sertraline (ZOLOFT) tablet 50 mg  50 mg Oral Daily Beverly Glasgow MD   50 mg at 09/05/19 0852   • sodium chloride 0.9 % flush 10 mL  10 mL Intravenous PRN Dung Valdez MD   10 mL at 09/04/19 0951        •  acetaminophen  •  diclofenac  •  ipratropium-albuterol  •  sodium chloride  No Known Allergies  Family History   Problem Relation Age of Onset   • Arthritis Other    • Diabetes Other    • Heart disease Other    • Hypertension Other    • Thyroid disease Other      Social History     Socioeconomic History   • Marital status:      Spouse name: Not on file   • Number of children: Not on file   • Years of education: Not on file   • Highest education level: Not on file   Tobacco Use   • Smoking status: Former Smoker     Types: Cigarettes   • Smokeless tobacco: Never Used   Substance and Sexual Activity   • Drug use: No   • Sexual activity: Defer     Review of Systems -all others reviewed and are negative except mentioned in the HPI      BP 98/59 (BP Location: Right arm, Patient Position: Lying)   Pulse 74   Temp 97.9 °F (36.6 °C) (Oral)   Resp 18   Ht 162.6 cm (64.02\")   Wt 60.5 kg (133 lb 6.1 oz)   LMP  (LMP Unknown)   SpO2 97%   BMI 22.88 kg/m²      Intake/Output Summary (Last 24 hours) at 9/5/2019 1453  Last data filed at 9/5/2019 0900  Gross per 24 hour   Intake 489 ml   Output 1650 ml   Net " -1161 ml     Physical Exam:      General Appearance:    Alert, cooperative, in no acute distress   Head:    Normocephalic, without obvious abnormality, atraumatic   Eyes:            Lids and lashes normal, conjunctivae and sclerae normal, no   icterus, no pallor, corneas clear, PERRLA   Ears:    Ears appear intact with no abnormalities noted   Throat:   No oral lesions, no thrush, oral mucosa moist   Neck:   No adenopathy, supple, trachea midline, no thyromegaly, no     carotid bruit, no JVD   Back:     No kyphosis present, no scoliosis present, no skin lesions,       erythema or scars, no tenderness to percussion or                   palpation,   range of motion normal   Lungs:     Clear to auscultation,respirations regular, even and                   unlabored    Heart:    Regular rhythm and normal rate, normal S1 and S2, no            murmur, no gallop, no rub, no click   Breast Exam:    Deferred   Abdomen:     Normal bowel sounds, no masses, no organomegaly, soft        non-tender, non-distended, no guarding, no rebound                 tenderness   Genitalia:    Deferred   Extremities:   Moves all extremities well, no edema, no cyanosis, no              redness   Pulses:   Pulses palpable and equal bilaterally   Skin:   No bleeding, bruising or rash   Lymph nodes:   No palpable adenopathy   Neurologic:   Cranial nerves 2 - 12 grossly intact, sensation intact, DTR        present and equal bilaterally       Results from last 7 days   Lab Units 09/05/19  0424   WBC 10*3/mm3 5.21   HEMOGLOBIN g/dL 8.7*   HEMATOCRIT % 28.7*   PLATELETS 10*3/mm3 172     Results from last 7 days   Lab Units 09/05/19  0425  09/04/19  0357   SODIUM mmol/L 141  --  142   POTASSIUM mmol/L 4.6  --  4.9   CHLORIDE mmol/L 104  --  106   CO2 mmol/L 24.0  --  25.0   BUN mg/dL 40*  --  33*   CREATININE mg/dL 1.28*   < > 1.06*   CALCIUM mg/dL 7.0*  --  7.4*   BILIRUBIN mg/dL  --   --  0.3  0.3   ALK PHOS U/L  --   --  84  84   ALT (SGPT) U/L   --   --  <5  <5   AST (SGOT) U/L  --   --  16  16   GLUCOSE mg/dL 70  --  91    < > = values in this interval not displayed.     Results from last 7 days   Lab Units 09/05/19  0425   SODIUM mmol/L 141   POTASSIUM mmol/L 4.6   CHLORIDE mmol/L 104   CO2 mmol/L 24.0   BUN mg/dL 40*   CREATININE mg/dL 1.28*   GLUCOSE mg/dL 70   CALCIUM mg/dL 7.0*     Imaging Results (last 72 hours)     Procedure Component Value Units Date/Time    XR Chest 1 View [[id number]] Collected:  09/05/19 0025     Updated:  09/05/19 0027    Narrative:       Chest x-ray portable    Suspected fluid overload. History of hypertension and CHF.    Single frontal portable view of the chest timed 1150 on 9/4/2019 and compared to 4:35 AM same day.    FINDINGS: Globular cardiac silhouette enlargement with left-sided pacer again seen. Continued interstitial edema and airspace disease predominantly centrally and asymmetrically worse on the left with left pleural fluid not excluded. On comparison to  prior, mild improvement in airspace disease and volume status. No pneumothorax.      Impression:       1. Mild improvement on comparison earlier film. Continued follow-up recommended.    Signer Name: Rosario Sullivan MD   Signed: 9/5/2019 12:25 AM   [facility]: [id number]    [facility]    XR Chest 1 View [[id number]] Collected:  09/04/19 0515     Updated:  09/04/19 0517    Narrative:       CR Chest 1 Vw    INDICATION:   Short of air and hypoxia     COMPARISON:    Chest x-ray 8/19/2019.    FINDINGS:  Single portable AP view(s) of the chest.  Globular cardiomediastinal silhouette enlargement and left-sided pacer/fibrillator again seen. Severe alveolar disease left greater than right. See accompanying CT chest dictation. No pneumothorax.      Impression:       Globular cardiomediastinal silhouette enlargement with severe left greater the right alveolar disease. Left-sided pacer defibrillator.    Signer Name: Rosario Sullivan MD   Signed:  9/4/2019 5:15 AM   Workstation Name: [facility]    [facility]    CT Angiogram Chest With Contrast [[id number]] Collected:  09/04/19 0514     Updated:  09/04/19 0516    Narrative:       INDICATION:   Hypoxic, short of air, recent fracture right arm.    TECHNIQUE:   CT angiogram of the chest with contrast. 3-D reformatted images were acquired.  Radiation dose reduction techniques included automated exposure control or exposure modulation based on body size. Radiation audit for number of CT and nuclear cardiology  exams performed in the last year:  0. Dose recorded in the patient's chart.    COMPARISON:   Chest x-ray from 8/19/2019.    FINDINGS:   There is no evidence for pulmonary embolism. There is globular cardiac silhouette enlargement with small to moderate pericardial effusion. There is moderate right-sided pleural effusion. There is likely moderate to large left pleural effusion with  pleural fluid seen at the apex.. There is atherosclerotic vascular calcification involving the thoracic aorta and great vessels. The timing of the contrast bolus because the patient's heart failure does not allow for assessment for thoracic aortic  dissection. The patient has a pacemaker.    There is severe alveolar disease left lung with near complete consolidation of the left lower lobe and considerable consolidation of the left upper lobe. Please correlate for clinical evidence of aspiration or pneumonia. There is patchier airspace  disease in the right lung including the right middle lobe, right lower lobe and right upper lobe. Many of the opacities are nodular and the possibility of metastatic disease or atypical infectious disease should also be considered.. Please correlate with  the patient's history. This study is not sensitive for adenopathy given the amount of pleural and parenchymal disease. There is at least partially calcified subcarinal node up to about 3.2 x 2.2 cm in dimension. Compression  fracture seen at the  thoracolumbar junction. There may be a left adrenal mass lesion 3.4 x 2.4 cm. This is partly in the field-of-view.      Impression:       No evidence for pulmonary most.  2. Globular cardiac silhouette enlargement.  3. Bilateral pleural effusions.  4. Extensive alveolar disease asymmetrically worse on the left than the right. In addition to the airspace disease there are multiple bilateral nodular lesions in the lungs. This could be infectious or inflammatory in etiology but metastatic disease is  also in the differential.  5. Partially calcified subcarinal lymph node.  6. No pneumothorax  7. Concern for left adrenal mass partly in the field-of-view.    Signer Name: Rosario Sullivan MD   Signed: 9/4/2019 5:14 AM   Workstation Name: [email or web address]    [facility] [place]        Impression: CHF  Dyspnea  Dementia  GOC      Plan: We will plan on seeing how patient does over the next 24 to possibly 48 hours to better assess whether she is inpatient hospice appropriate versus outpatient hospice appropriate.  If she does have a long-term care bed at the nursing facility outpatient hospice may be the most appropriate.        Chuy Cavazos DO  09/05/19  2:53 PM              Electronically signed by Chuy Cavazos DO at 9/5/2019  2:56 PM     Didier Del Real MD at 9/4/2019  9:58 AM      Consult Orders    1. Inpatient Infectious Diseases Consult [[id number]] ordered by Adela Gomez PA at 09/04/19 0613                Infectious Disease Consult  Imelda BARRERA Erica  4/29/1923  [id number]    Date of Consult: 9/4/2019  Admission Date: 9/4/2019    Requesting Provider: Beverly GUZMAN MD  Evaluating Physician: Didier Del Real MD    Chief Complaint: shortness of breath, cough    Reason for Consultation: possible pneumonia    History of present illness:   Patient is a 89 y.o.  Yr old female with history of idiopathic cardiomyopathy (EF 20%), osteoarthritis, CKD, DVT, gout, pacemaker/ICD,  dementia. Recent admission from 8/19 to 8/21 with heart failure, tx with diuresis.    Presented back to [facility] ED on 9/4 from SNF due to concerns of of worsening shortness of breath and productive cough. Afebrile.  Unfortunately she is not oriented to place or time and is unable to provide any additional history. She is currently on 3 L O2. BP stable. WBC 6. Blood cx pending. CTA concerning for left sided pnuemonia, Started on vancomycin and zosyn. BNP is 7535. Lactate 1.0. ID consulted for assistance with antibiotics. Currently resting comfortably. RN has not appreciated any cough. Patient complains of chronic chest pain.     Review of Systems  Unable to provide other than as above due to dementia      Past Medical History:   Diagnosis Date   • Acute GI bleeding     12/09/2013 - 12/24/2013. Status post 7 units of packed red blood cells. Esophagogastroduodenoscopy (no active bleed) and colonoscopy (active bleeding from flat cecal polyps and two ascending colonic polyps) suggestive of non-steroidal anti-inflammatory drug colopathy or mild ischemia. Positive for Clostridium difficile status post 14 days of antibiotics.     • Arthritis     reinitiation of celebrex, October 2013   • CRI (chronic renal insufficiency)     mild   • DVT (deep venous thrombosis) (CMS/HCC)     RLE   • Gout    • Histoplasmosis    • Hyperlipidemia    • Hypertension    • Hypotension     symptoms of dizziness   • Idiopathic cardiomyopathy (CMS/HCC)        Past Surgical History:   Procedure Laterality Date   • APPENDECTOMY     • HYSTERECTOMY     • PACEMAKER IMPLANTATION      change out on 10/30/15 without complication   • POLYPECTOMY     • THYROIDECTOMY      chronic replacement therapy       Social History     Socioeconomic History   • Marital status:      Spouse name: Not on file   • Number of children: Not on file   • Years of education: Not on file   • Highest education level: Not on file   Tobacco Use   • Smoking status: Former Smoker      Types: Cigarettes   • Smokeless tobacco: Never Used   Substance and Sexual Activity   • Drug use: No   • Sexual activity: Defer       family history includes Arthritis in her other; Diabetes in her other; Heart disease in her other; Hypertension in her other; Thyroid disease in her other.    No Known Allergies    Antibiotics:  Anti-Infectives (From admission, onward)    Ordered     Dose/Rate Route Frequency Start Stop    09/04/19 0621  vancomycin (dosing per levels)     Ordering Provider:  Didier Moses RP     Does not apply Daily 09/05/19 0600 09/09/19 0559    09/04/19 0613  piperacillin-tazobactam (ZOSYN) 3.375 g in iso-osmotic dextrose 50 ml (premix)     Ordering Provider:  Adela Gomez PA    3.375 g  over 4 Hours Intravenous Every 8 Hours Scheduled 09/04/19 1200 09/09/19 1159    09/04/19 0613  Pharmacy to dose vancomycin     Ordering Provider:  Adela Gomez PA     Does not apply Continuous PRN 09/04/19 0613 09/09/19 0612    09/04/19 0522  vancomycin 1250 mg/250 mL 0.9% NS IVPB ([facility])     Ordering Provider:  Dung Valdez MD    20 mg/kg × 60.5 kg Intravenous Once 09/04/19 0524 09/04/19 0552    09/04/19 0522  piperacillin-tazobactam (ZOSYN) 3.375 g in iso-osmotic dextrose 50 ml (premix)     Ordering Provider:  Dung Valdez MD    3.375 g Intravenous Once 09/04/19 0524 09/04/19 0552          Other Medications:  Current Facility-Administered Medications   Medication Dose Route Frequency Provider Last Rate Last Dose   • acetaminophen (TYLENOL) tablet 650 mg  650 mg Oral Q6H PRN Beverly Glasgow MD       • carvedilol (COREG) tablet 12.5 mg  12.5 mg Oral BID With Meals Beverly Glasgow MD   12.5 mg at 09/04/19 0951   • diclofenac (VOLTAREN) 1 % gel 4 g  4 g Topical BID PRN Beverly Glasgow MD       • donepezil (ARICEPT) tablet 5 mg  5 mg Oral Nightly Beverly Glasgow MD       • ferrous sulfate tablet 325 mg  325 mg Oral Daily With Breakfast Beverly Glasgow MD   325 mg at 09/04/19 2207   •  "folic acid (FOLVITE) tablet 1 mg  1 mg Oral Daily Beverly Glasgow MD   1 mg at 09/04/19 0950   • furosemide (LASIX) injection 40 mg  40 mg Intravenous Once Luma Mane DO       • ipratropium-albuterol (DUO-NEB) nebulizer solution 3 mL  3 mL Nebulization Q4H PRN Adela Gomez PA   3 mL at 09/04/19 0637   • levothyroxine (SYNTHROID, LEVOTHROID) tablet 50 mcg  50 mcg Oral Q AM Beverly Glasgow MD   50 mcg at 09/04/19 0957   • lisinopril (PRINIVIL,ZESTRIL) tablet 2.5 mg  2.5 mg Oral Daily Beverly Glasgow MD   2.5 mg at 09/04/19 0950   • mirtazapine (REMERON) tablet 15 mg  15 mg Oral Daily Beverly Glasgow MD   15 mg at 09/04/19 0950   • piperacillin-tazobactam (ZOSYN) 3.375 g in iso-osmotic dextrose 50 ml (premix)  3.375 g Intravenous Q8H Adela Gomez PA        And   • Pharmacy to dose vancomycin   Does not apply Continuous PRN Aedla Gomez PA       • sertraline (ZOLOFT) tablet 50 mg  50 mg Oral Daily Beverly Glasgow MD   50 mg at 09/04/19 0950   • sodium chloride 0.9 % flush 10 mL  10 mL Intravenous PRN Dung Valdez MD   10 mL at 09/04/19 0951   • [START ON 9/5/2019] vancomycin (dosing per levels)   Does not apply Daily Didier Moses, [facility]           Physical Exam:   Vital Signs   /52 (BP Location: Right arm, Patient Position: Lying)   Pulse 92   Temp 97.4 °F (36.3 °C) (Oral)   Resp 26   Ht 162.6 cm (64.02\")   Wt 60.5 kg (133 lb 6.1 oz)   LMP  (LMP Unknown)   SpO2 93%   BMI 22.88 kg/m²      GENERAL: Awake and alert, but not oriented. Elderly but in no acute distress.   HEENT: Normocephalic, atraumatic.  PERRL. EOMI. No conjunctival injection. No icterus. Oropharynx clear without evidence of thrush or exudate.   NECK: Supple without nuchal rigidity. Moderate JVD appreciated  LYMPH: No cervical lymphadenopathy.  HEART: RRR; No murmur.  LUNGS: Rales throughout the left lung fields.  Diminished at right base.  No cough appreciated  ABDOMEN: Soft, nontender, nondistended. Positive " bowel sounds. No rebound or guarding. No CVA tenderness  : no gramajo  EXT:  Chronic joint deformities on both hands, but no cyanosis, clubbing or significant edema.  MSK: FROM without joint effusions noted arms/legs.    SKIN: Warm and dry without cutaneous eruptions on Inspection/palpation.    NEURO: Oriented to name but not place or time. No focal deficits on motor/sensory exam at arms/legs.  PSYCHIATRIC: dementia     Laboratory Data    Results from last 7 days   Lab Units 09/04/19  0405 09/04/19 0357   WBC 10*3/mm3  --  6.12   HEMOGLOBIN g/dL  --  7.8*   HEMOGLOBIN, POC g/dL 7.1*  --    HEMATOCRIT %  --  27.3*   HEMATOCRIT POC % 21*  --    PLATELETS 10*3/mm3  --  181     Results from last 7 days   Lab Units 09/04/19 0405 09/04/19 0357   SODIUM mmol/L  --  142   POTASSIUM mmol/L  --  4.9   CHLORIDE mmol/L  --  106   CO2 mmol/L  --  25.0   BUN mg/dL  --  33*   CREATININE mg/dL 1.30 1.06*   GLUCOSE mg/dL  --  91   CALCIUM mg/dL  --  7.4*     Estimated Creatinine Clearance: 24.2 mL/min (by C-G formula based on SCr of 1.3 mg/dL).  Results from last 7 days   Lab Units 09/04/19 0357   ALK PHOS U/L 84  84   BILIRUBIN mg/dL 0.3  0.3   BILIRUBIN DIRECT mg/dL <0.2*   ALT (SGPT) U/L <5  <5   AST (SGOT) U/L 16  16               Microbiology:    9/4 blood cx pending    MRSA and sputum cx pending    Radiology:  Xr Chest 1 View    Result Date: 9/4/2019  Globular cardiomediastinal silhouette enlargement with severe left greater the right alveolar disease. Left-sided pacer defibrillator. Signer Name: Rosario Sullivan MD  Signed: 9/4/2019 5:15 AM  Workstation Name: [email or web address]  [facility] [place]    Ct Angiogram Chest With Contrast    Result Date: 9/4/2019  No evidence for pulmonary most. 2. Globular cardiac silhouette enlargement. 3. Bilateral pleural effusions. 4. Extensive alveolar disease asymmetrically worse on the left than the right. In addition to the airspace disease there are multiple bilateral nodular  lesions in the lungs. This could be infectious or inflammatory in etiology but metastatic disease is also in the differential. 5. Partially calcified subcarinal lymph node. 6. No pneumothorax 7. Concern for left adrenal mass partly in the field-of-view. Signer Name: Rosario Sullivan MD  Signed: 9/4/2019 5:14 AM  Workstation Name: [email or web address]  [facility] [place]     I personally reviewed the above imaging studies.      Impression:   21. Acute on chronic hypoxic respiratory failure: unknown baseline, currently comfortable on 3 L  22. Consolidation of LLL and DEJUAN on CT concerning for pneumonia:   23. Right pleural effusion  24. Acute on chronic systolic heart failure  25. Idiopathic cardiomyopathy (last EF 20%)  26. Dementia  27. Pacemaker, ICD  28. CKD stage 4  29. Hx of DVT  30. Hypothyroid    At this time is unclear to me how much worse than her baseline she truly is.  We will have to corroborate more history from family and skilled nursing facility.  She does have extensive pulmonary consolidations on the left and rales on exam although she do not demonstrate any cough during my interview.  She is nontoxic-appearing and has normal WBC, lactic acid, suggesting against infection however she is certainly high risk for healthcare associated pneumonia given her recent admissions and residence in a skilled nursing facility. Also high risk for aspiration. She likely has combination of both pneumonia and acute heart failure and will treat with empiric antibiotics as well as diuresis.    PLAN: Thank you for asking us to see Imelda Maldonado, I recommend the following:   - f/u pending blood cx  - sputum culture if cough is productive  - MRSA nares screen, discussed with RN  - trend CBC, BMP  - check procalcitonin    - since she is non-toxic will stop vancomycin. She already had a dose this AM which will cover for the next 24 hrs at least  - continue empiric zosyn, renally dosed, pending clinical improvement    -  diuresis and other supportive care per primary team    Discussed with Dr Glasgow. I will follow. Call with questions.     Didier Del Real MD  2019      Electronically signed by Didier Del Real MD at 2019 10:43 AM     eLs Cardozo MD at 2019  9:06 AM      Consult Orders    1. Inpatient Cardiology Consult [[id number]] ordered by Luma Mane DO at 19 0627                [facility]  CARDIOLOGY CONSULTATION NOTE    Imelda Maldonado  : 1923  MRN:[id number]    Date of Admission:2019  Date of Consultation: 19    PCP: Leyla Castro DO    IDENTIFICATION: A 89 y.o. female resident of [facility]     Chief Complaint   Patient presents with   • Shortness of Breath     PROBLEM LIST:   1. 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   a. Left bundle branch block.  b. History of nonsustained ventricular ectopy.  c. “Normal” coronary arteries by catheterization, 2003, echo ejection fraction of 30%, 2003.  d. Echocardiogram, 2004:  Global hypokinesis, ejection fraction 25%, moderate mitral regurgitation.  e. "[employer] BI-V pacemaker implantation by Dr. Naidu on 2010 also with a coronary sinus lead placement.    f. Patient refuses upgrade to an ICD and Coumadin, 2010.    g. Atrial fibrillation with “failed” cardioversion (early recurrence), 2010.  h. Biventricular pacemaker generator change, 10/30/2015.  i. Echocardiogram, 10/1/18 with severe MR and moderate AI, EF=21%-25%.  j. Acute on chronic SHF 2019   1. Echo 19: LV and LA are mod-severely dilated, mod-severe MR, EF 24%, no evidence of PAH; moderately reduced RVSF   2. Acute gastrointestinal bleed with hospitalization, 2013 - 2013.  a. Status post 7 units of packed red blood cells.  b. Esophagogastroduodenoscopy (no active bleed) and colonoscopy (active bleeding from  flat cecal polyps and two ascending colonic polyps) suggestive of non-steroidal anti-inflammatory drug colopathy or mild ischemia.    c. Positive for Clostridium difficile status post 14 days of antibiotics.  d. Discontinuation of aspirin, no anticoagulation.    3. Arthritis.  a. Reinitiation of Celebrex, October 2013.  4. Dyslipidemia.  5. Gout.  6. History of histoplasmosis, OS.  7. History of remote DVT, RLE.  8. Status post thyroidectomy, remote.  a. Chronic replacement therapy.  9. History of intermittent mild CRI.  10. Hypertension.  11. Surgical history.  a. Appendectomy.  b. Thyroidectomy.  c. Hysterectomy.  d. Polypectomy.  12. Dementia   13. DNR code   14. Healthcare associated pneumonia September 2019  15. Anemia     ALLERGIES: No Known Allergies    HPI: Patient is a poor historian due to dementia, and history obtained mostly from chart. She is a 89 y/o WF who was recently admitted a few weeks ago for acute/chronic SHF. She was diuresed and discharged back to her nursing home. She returns with acute/chronic hypoxia, and CT chest shows worsening left lung disease, concerning for pneumonia. She has been started on IV antibiotics and ID has been consulted. Patient denies dyspnea, cough, fever or chills. She denies chest pain. She is visibly dyspneic however, with increased work of breathing. Her proBNP is 7500 and cardiology has been consulted for acute/chronic SHF. Further history cannot be obtained from the patient.       ROS: Limited, see above for pertinent negatives.     Surgical History:   Past Surgical History:   Procedure Laterality Date   • APPENDECTOMY     • HYSTERECTOMY     • PACEMAKER IMPLANTATION      change out on 10/30/15 without complication   • POLYPECTOMY     • THYROIDECTOMY      chronic replacement therapy     Social History:   Social History     Socioeconomic History   • Marital status:    Tobacco Use   • Smoking status: Former Smoker     Types: Cigarettes   • Smokeless tobacco:  "Never Used   Substance and Sexual Activity   • Drug use: No   • Sexual activity: Defer     Family History:   Family History   Problem Relation Age of Onset   • Arthritis Other    • Diabetes Other    • Heart disease Other    • Hypertension Other    • Thyroid disease Other        Objective     /52 (BP Location: Right arm, Patient Position: Lying)   Pulse 92   Temp 97.4 °F (36.3 °C) (Oral)   Resp 26   Ht 162.6 cm (64.02\")   Wt 60.5 kg (133 lb 6.1 oz)   LMP  (LMP Unknown)   SpO2 93%   BMI 22.88 kg/m²      Intake/Output Summary (Last 24 hours) at 9/4/2019 0906  Last data filed at 9/4/2019 0552  Gross per 24 hour   Intake 100 ml   Output --   Net 100 ml     PHYSICAL EXAM:  CONSTITUTIONAL: Elderly female, frail, cooperative, in no acute distress  HEENT: Normocephalic, atraumatic, PERRLA, no JVD  CARDIOVASCULAR:  Regular rhythm and normal rate, distant heart tones due to coarse lung sounds.   RESPIRATORY: Mildly increased respiratory effort, coarse lung sounds and diffuse ronchi bilaterally, worse on the left. On 3L NC  GI: Soft, nontender  MUSCULOSKELETAL: No gross deformities, no edema  SKIN: Warm, dry. No bleeding,  + bruising on right anterior shin, no rash  NEUROLOGICAL: No focal deficits  PSYCHIATRIC: Oriented to self only.  Normal mood and affect.    Labs/Diagnostic Data  Results from last 7 days   Lab Units 09/04/19  0405 09/04/19 0357   SODIUM mmol/L  --  142   POTASSIUM mmol/L  --  4.9   CHLORIDE mmol/L  --  106   CO2 mmol/L  --  25.0   BUN mg/dL  --  33*   CREATININE mg/dL 1.30 1.06*   GLUCOSE mg/dL  --  91   CALCIUM mg/dL  --  7.4*     Results from last 7 days   Lab Units 09/04/19  0639 09/04/19  0357   TROPONIN T ng/mL 0.024 0.033*     Results from last 7 days   Lab Units 09/04/19  0405 09/04/19  0357   WBC 10*3/mm3  --  6.12   HEMOGLOBIN g/dL  --  7.8*   HEMOGLOBIN, POC g/dL 7.1*  --    HEMATOCRIT %  --  27.3*   HEMATOCRIT POC % 21*  --    PLATELETS 10*3/mm3  --  181       Results from last 7 " "days   Lab Units 09/04/19  0357   PROBNP pg/mL 7,535.0*     Results from last 7 days   Lab Units 09/04/19  0356   PROTIME Seconds 15.3*   INR  1.27*     I personally reviewed the patient's EKG/Telemetry data    Radiology Data:   CTA chest 9/4/19:  IMPRESSION:  No evidence for pulmonary most.  2. Globular cardiac silhouette enlargement.  3. Bilateral pleural effusions.  4. Extensive alveolar disease asymmetrically worse on the left than the right. In addition to the airspace disease there are multiple bilateral nodular lesions in the lungs. This could be infectious or inflammatory in etiology but metastatic disease is  also in the differential.  5. Partially calcified subcarinal lymph node.  6. No pneumothorax  7. Concern for left adrenal mass partly in the field-of-view.    Current Medications:    carvedilol 12.5 mg Oral BID With Meals   donepezil 5 mg Oral Nightly   ferrous sulfate 325 mg Oral Daily With Breakfast   folic acid 1 mg Oral Daily   furosemide 40 mg Intravenous Once   levothyroxine 50 mcg Oral Q AM   lisinopril 2.5 mg Oral Daily   mirtazapine 15 mg Oral Daily   piperacillin-tazobactam 3.375 g Intravenous Q8H   sertraline 50 mg Oral Daily   [START ON 9/5/2019] vancomycin (dosing per levels)  Does not apply Daily       Pharmacy to dose vancomycin        Assessment and Plan:     1. Health care associated pneumonia  - per hospitalists and ID    2. A/C SHF  - recent echo with EF 24% and dilated LV  - on low dose Lisinopril as previously could not tolerate Entresto due to marginal low BPs last admission   - s/p IV Lasix 40mg - strict I&Os     3. Anemia  - per hospitalists    4. Dementia/DNR    I know her well after following her in my office for years. 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 I have nothing to add other than to urge referral to hospice care. This will minimize the discomforts, costs, and angst  associated " with repeat hospital admissions to palliate symptoms of an underlying disease that is clearly end-stage.      Scribed for Les Cardozo MD by Danna Rodriguez PA-C. 9/4/2019  9:06 AM      Thank you for allowing me to participate in the care of Imelda Maldonado. Feel free to contact me directly with any further questions or concerns.      Electronically signed by Les Cardozo MD at 9/4/2019  2:46 PM       Nutrition Notes (last 72 hours) (Notes from 9/3/2019  4:12 PM through 9/6/2019  4:12 PM)     No notes of this type exist for this encounter.

## 2019-09-06 NOTE — PROGRESS NOTES
Continued Stay Note  [facility]     Patient Name: Imelda Maldonado  MRN: [id number]  Today's Date: 9/6/2019    Admit Date: 9/4/2019    Discharge Plan     Row Name 09/06/19 1533       Plan    Plan  Return to [facility] today, per ambulance at 1700    Final Discharge Disposition Code  03 - skilled nursing facility (SNF)    Final Note  Hospice consult, chart reviewed, call made to daughter; who does not want further aggressive treatment, and wants patient to have hospice in facility. Patient is permanent resident at [facility]. Visit made to see patient, who is pleasantlly confused. Dr Glasgow contacted per Dr Cavazos for discharge today, Dr Glasgow in agreement that patient could be discharged .Ambulance set for 1700 today PCS, and physician's order for DNR on chart. Called facility informed staff nurse that patient will be returning today at 1700, ask  about O2 4l/min for patient, informed O2 will be provided by facility until hospice O2 arrives. Primary nurse, Emilia, given number for facility to call report to patient's nurse. Contacted daughter to let her know patient will discharge to facility today. She is in agreement with all plans. Contacted [facility] that patient will be discharging today.        Discharge Codes    No documentation.             Niru Castillo RN

## 2019-09-06 NOTE — PROGRESS NOTES
Palliative Care Progress Note    Date of Admission: 9/4/2019    Subjective: Patient herself has no complaints at this point time.  Nursing staff report episodes of confusion.  Current Code Status     Date Active Code Status Order ID Comments User Context       9/4/2019 06:57 No CPR [id number]  Adela Gomez PA Inpatient       Questions for Current Code Status     Question Answer Comment    Code Status No CPR     Medical Interventions (Level of Support Prior to Arrest) Comfort Measures     Comments per advanced directive on chart         No current facility-administered medications on file prior to encounter.      Current Outpatient Medications on File Prior to Encounter   Medication Sig Dispense Refill   • acetaminophen (TYLENOL) 325 MG tablet Take 650 mg by mouth Every 6 (Six) Hours As Needed for mild pain (1-3).     • bisacodyl (BISAC-EVAC) 10 MG suppository Insert 10 mg into the rectum Daily As Needed for constipation.     • carvedilol (COREG) 12.5 MG tablet Take 12.5 mg by mouth 2 (Two) Times a Day With Meals.     • diclofenac (VOLTAREN) 1 % gel gel Apply 4 g topically to the appropriate area as directed 2 (Two) Times a Day As Needed.     • donepezil (ARICEPT) 5 MG tablet Take 5 mg by mouth Every Night.     • ferrous sulfate 325 (65 FE) MG tablet Take 325 mg by mouth Daily With Breakfast.     • folic acid (FOLVITE) 1 MG tablet Take 1 mg by mouth Daily.     • furosemide (LASIX) 40 MG tablet Take 1 tablet by mouth 3 (Three) Times a Week. (Patient taking differently: Take 40 mg by mouth 3 (Three) Times a Week. MonWedFri; Hold for SBP <110)     • levothyroxine (SYNTHROID, LEVOTHROID) 50 MCG tablet Take 50 mcg by mouth Daily.     • lisinopril (PRINIVIL,ZESTRIL) 2.5 MG tablet Take 2.5 mg by mouth Daily. Hold if SBP <110     • Magnesium Hydroxide (MILK OF MAGNESIA PO) Take 30 mL by mouth Daily As Needed.     • mirtazapine (REMERON) 7.5 MG half tablet Take 15 mg by mouth Daily.     • nitroglycerin (NITROSTAT) 0.4 MG  "SL tablet Place 0.4 mg under the tongue Every 5 (Five) Minutes As Needed for Chest Pain. Take no more than 3 doses in 15 minutes.     • sertraline (ZOLOFT) 50 MG tablet Take 50 mg by mouth Daily.          •  acetaminophen  •  diclofenac  •  ipratropium-albuterol  •  sodium chloride    Objective: /60 (BP Location: Right arm, Patient Position: Sitting)   Pulse 78   Temp 97.5 °F (36.4 °C) (Oral)   Resp 18   Ht 162.6 cm (64.02\")   Wt 60.5 kg (133 lb 6.1 oz)   LMP  (LMP Unknown)   SpO2 97%   BMI 22.88 kg/m²      Intake/Output Summary (Last 24 hours) at 9/6/2019 1439  Last data filed at 9/6/2019 0909  Gross per 24 hour   Intake 160 ml   Output 850 ml   Net -690 ml     Physical Exam:      General Appearance:   Awake, pleasantly confused oriented x1 to person   Head:    Normocephalic, without obvious abnormality, atraumatic   Eyes:            Lids and lashes normal, conjunctivae and sclerae normal, no   icterus, no pallor, corneas clear, PERRLA   Ears:    Ears appear intact with no abnormalities noted   Throat:   No oral lesions, no thrush, oral mucosa moist   Neck:   No adenopathy, supple, trachea midline, no thyromegaly, no     carotid bruit, no JVD   Back:     No kyphosis present, no scoliosis present, no skin lesions,       erythema or scars, no tenderness to percussion or                   palpation,   range of motion normal   Lungs:     Clear to auscultation,respirations regular, even and                   unlabored    Heart:    Regular rhythm and normal rate, normal S1 and S2, no            murmur, no gallop, no rub, no click   Breast Exam:    Deferred   Abdomen:     Normal bowel sounds, no masses, no organomegaly, soft        non-tender, non-distended, no guarding, no rebound                 tenderness   Genitalia:    Deferred   Extremities:   Moves all extremities well, no edema, no cyanosis, no              redness   Pulses:   Pulses palpable and equal bilaterally   Skin:   No bleeding, bruising or " rash   Lymph nodes:   No palpable adenopathy   Neurologic:   Cranial nerves 2 - 12 grossly intact, sensation intact, DTR        present and equal bilaterally     Results from last 7 days   Lab Units 09/06/19  0712   WBC 10*3/mm3 4.82   HEMOGLOBIN g/dL 7.7*   HEMATOCRIT % 26.0*   PLATELETS 10*3/mm3 154     Results from last 7 days   Lab Units 09/06/19  0712  09/04/19  0357   SODIUM mmol/L 140   < > 142   POTASSIUM mmol/L 4.0   < > 4.9   CHLORIDE mmol/L 101   < > 106   CO2 mmol/L 27.0   < > 25.0   BUN mg/dL 41*   < > 33*   CREATININE mg/dL 1.39*   < > 1.06*   CALCIUM mg/dL 6.9*   < > 7.4*   BILIRUBIN mg/dL  --   --  0.3  0.3   ALK PHOS U/L  --   --  84  84   ALT (SGPT) U/L  --   --  <5  <5   AST (SGOT) U/L  --   --  16  16   GLUCOSE mg/dL 87   < > 91    < > = values in this interval not displayed.       Impression: CHF  Dyspnea  Dementia  [facility]  Plan: Plan is for the patient be discharged to the nursing facility with hospice.  Hospice case management working on disposition.        Chuy Cavazos DO  09/06/19  2:39 PM

## 2019-09-07 PROBLEM — R77.8 ELEVATED TROPONIN: Status: RESOLVED | Noted: 2019-01-01 | Resolved: 2019-01-01

## 2019-09-07 NOTE — PLAN OF CARE
Problem: Fall Risk (Adult)  Goal: Absence of Fall  Outcome: Ongoing (interventions implemented as appropriate)      Problem: Patient Care Overview  Goal: Plan of Care Review  Outcome: Ongoing (interventions implemented as appropriate)   09/07/19 0449   Coping/Psychosocial   Plan of Care Reviewed With patient   Plan of Care Review   Progress no change   OTHER   Outcome Summary VSS. No acute overnight events. 4L NC. Pt to d/c in the am. Will continue to monitor.       Problem: Breathing Pattern Ineffective (Adult)  Goal: Effective Oxygenation/Ventilation  Outcome: Ongoing (interventions implemented as appropriate)    Goal: Anxiety/Fear Reduction  Outcome: Ongoing (interventions implemented as appropriate)      Problem: Confusion, Chronic (Adult)  Goal: Cognitive/Functional Impairments Minimized  Outcome: Ongoing (interventions implemented as appropriate)      Problem: Skin Injury Risk (Adult)  Goal: Skin Health and Integrity  Outcome: Ongoing (interventions implemented as appropriate)      Problem: Pain, Chronic (Adult)  Goal: Identify Related Risk Factors and Signs and Symptoms  Outcome: Outcome(s) achieved Date Met: 09/07/19    Goal: Acceptable Pain/Comfort Level and Functional Ability  Outcome: Ongoing (interventions implemented as appropriate)

## 2019-09-07 NOTE — PROGRESS NOTES
Case Management Discharge Note    Final Note: Per arrangements made through hospice service, patient discharging to [facility] with hospice today via [facility] ambulance service scheduled at 1000.  Discharge Summary can be faxed to [phone].    Destination - Selection Complete      Service Provider Request Status Selected Services Address Phone Number Fax Number    [facility] Selected Skilled Nursing [address] [place] [id number] [phone]      Durable Medical Equipment      No service has been selected for the patient.      Dialysis/Infusion      No service has been selected for the patient.      Home Medical Care      No service has been selected for the patient.      Therapy      No service has been selected for the patient.      Community Resources      No service has been selected for the patient.             Final Discharge Disposition Code: 03 - skilled nursing facility (SNF)

## 2019-09-07 NOTE — DISCHARGE SUMMARY
[facility] Medicine Services  TRANSFER SUMMARY    Patient Name: Imelda Maldonado  : 1923  MRN: [id number]    Date of Admission: 2019  Date of Discharge: 2019  Length of Stay: 3  Primary Care Physician: Leyla Castro DO    Consults     Date and Time Order Name Status Description    2019 1359 Inpatient Palliative Care MD Consult Completed     2019 0627 Inpatient Cardiology Consult Completed     2019 0613 Inpatient Infectious Diseases Consult Completed     2019 0643 Inpatient Cardiology Consult Completed           Hospital Course     Presenting Problem:   HCAP (healthcare-associated pneumonia) [J18.9]    Active Hospital Problems    Diagnosis  POA   • **Acute on chronic congestive heart failure (CMS/HCC) [I50.9]  Yes   • Acute on chronic anemia [D64.9]  Yes   • HCAP (healthcare-associated pneumonia) [J18.9]  Yes   • Hypothyroidism (acquired) [E03.9]  Yes   • Dementia [F03.90]  Yes   • Pacemaker [Z95.0]  Yes   • Acute and chronic respiratory failure with hypoxia (CMS/HCC) [J96.21]  Yes   • CKD (chronic kidney disease) stage 3, GFR 30-59 ml/min (CMS/HCC) [N18.3]  Yes   • Idiopathic cardiomyopathy (CMS/HCC) [I42.8]  Yes   • Hyperlipidemia [E78.5]  Yes   • Essential hypertension [I10]  Yes      Resolved Hospital Problems    Diagnosis Date Resolved POA   • Elevated troponin [R74.8] 2019 Yes          Hospital Course:  Imelda Maldonado is a 89 y.o. female with hx of dementia, CAD, chronic systolic CHF (EF 25%) s/p BiV-ICD, HTN, HLD, remote DVT, GI bleed in , hx of C.diff, and Afib (not anticoagulated due to hx of GI bleed) who presents due to SOA and hypoxia. Recent hospitalization - due to CHF exacerbation. CTA chest in the ER showed no PE but moderate bilateral pleural effusions, alveolar disease worse on the left with near complete consolidation of the left lower lobe. proBNP elevated at 7535 with mild elevation in troponin 0.033.  Admitted for  treatment of CHF and HCAP.  She was diuresed with IV lasix and this will be changed to oral at dc.  Cardiology was consulted and had nothing else to add, but recommended hospice for end stage heart failure Palliative/Hospice was consulted and discussed with daughter over the phone who lives out of town and is agreeable.  Antibiotics will be dc'd at discharge.          Discharge Follow Up Recommendations for labs/diagnostics:  Provider at facility within 1 week  Continue oxygen    Day of Discharge     HPI:   No noted issues overnight    Review of Systems   Unable to perform ROS: Dementia     Vital Signs:   Temp:  [97.5 °F (36.4 °C)-98.1 °F (36.7 °C)] 97.8 °F (36.6 °C)  Heart Rate:  [74-79] 74  Resp:  [18] 18  BP: (100-127)/(41-68) 100/68     Physical Exam:  Constitutional: No acute distress, sleeping, lying on left side  HENT: NCAT, mucous membranes moist  Respiratory: Clear to auscultation bilaterally, decreased in bases, no wheezing/rhonchin noted. respiratory effort normal   Cardiovascular: Regular, paced on tele, no murmurs, rubs, or gallops, palpable pedal pulses bilaterally  Gastrointestinal: Positive bowel sounds, soft, nontender, nondistended  Musculoskeletal: No bilateral ankle edema  Psychiatric: Cooperative, pleasant  Neurologic: Oriented x 1, answers simple questions, speech clear  Skin: No rashes noted        Pertinent Results     Results from last 7 days   Lab Units 09/06/19  0712 09/05/19  0425 09/05/19  0424 09/04/19  0405 09/04/19  0357   WBC 10*3/mm3 4.82  --  5.21  --  6.12   HEMOGLOBIN g/dL 7.7*  --  8.7*  --  7.8*   HEMOGLOBIN, POC g/dL  --   --   --  7.1*  --    HEMATOCRIT % 26.0*  --  28.7*  --  27.3*   HEMATOCRIT POC %  --   --   --  21*  --    PLATELETS 10*3/mm3 154  --  172  --  181   SODIUM mmol/L 140 141  --   --  142   POTASSIUM mmol/L 4.0 4.6  --   --  4.9   CHLORIDE mmol/L 101 104  --   --  106   CO2 mmol/L 27.0 24.0  --   --  25.0   BUN mg/dL 41* 40*  --   --  33*   CREATININE mg/dL  1.39* 1.28*  --  1.30 1.06*   GLUCOSE mg/dL 87 70  --   --  91   CALCIUM mg/dL 6.9* 7.0*  --   --  7.4*     Results from last 7 days   Lab Units 09/04/19  0357 09/04/19  0356   BILIRUBIN mg/dL 0.3  0.3  --    ALK PHOS U/L 84  84  --    ALT (SGPT) U/L <5  <5  --    AST (SGOT) U/L 16  16  --    PROTIME Seconds  --  15.3*   INR   --  1.27*       Brief Urine Lab Results  (Last result in the past 365 days)      Color   Clarity   Blood   Leuk Est   Nitrite   Protein   CREAT   Urine HCG        10/01/18 0159 Yellow Clear Negative Trace Negative 30 mg/dL (1+)               Microbiology Results Abnormal     Procedure Component Value - Date/Time    Blood Culture - Blood, Arm, Left [[id number]] Collected:  09/04/19 0530    Lab Status:  Preliminary result Specimen:  Blood from Arm, Left Updated:  09/07/19 0645     Blood Culture No growth at 3 days    Blood Culture - Blood, Arm, Right [[id number]] Collected:  09/04/19 0500    Lab Status:  Preliminary result Specimen:  Blood from Arm, Right Updated:  09/07/19 0645     Blood Culture No growth at 3 days    MRSA Screen, PCR - Swab, Nares [[id number]]  (Normal) Collected:  09/04/19 1800    Lab Status:  Final result Specimen:  Swab from Nares Updated:  09/1934     MRSA PCR Negative    Narrative:       MRSA Negative          Imaging Results (all)     Procedure Component Value Units Date/Time    XR Chest 1 View [[id number]] Collected:  09/05/19 0025     Updated:  09/05/19 0027    Narrative:       Chest x-ray portable    Suspected fluid overload. History of hypertension and CHF.    Single frontal portable view of the chest timed 1150 on 9/4/2019 and compared to 4:35 AM same day.    FINDINGS: Globular cardiac silhouette enlargement with left-sided pacer again seen. Continued interstitial edema and airspace disease predominantly centrally and asymmetrically worse on the left with left pleural fluid not excluded. On comparison to  prior, mild improvement in airspace disease and volume  status. No pneumothorax.      Impression:       1. Mild improvement on comparison earlier film. Continued follow-up recommended.    Signer Name: Rosario Sullivan MD   Signed: 9/5/2019 12:25 AM   Workstation [facility]: [facility]    [facility]    XR Chest 1 View [[id number]] Collected:  09/04/19 0515     Updated:  09/04/19 0517    Narrative:       CR Chest 1 Vw    INDICATION:   Short of air and hypoxia     COMPARISON:    Chest x-ray 8/19/2019.    FINDINGS:  Single portable AP view(s) of the chest.  Globular cardiomediastinal silhouette enlargement and left-sided pacer/fibrillator again seen. Severe alveolar disease left greater than right. See accompanying CT chest dictation. No pneumothorax.      Impression:       Globular cardiomediastinal silhouette enlargement with severe left greater the right alveolar disease. Left-sided pacer defibrillator.    Signer Name: Rosario Sullivan MD   Signed: 9/4/2019 5:15 AM   Workstation Name: [facility]    CT Angiogram Chest With Contrast [[id number]] Collected:  09/04/19 0514     Updated:  09/04/19 0516    Narrative:       INDICATION:   Hypoxic, short of air, recent fracture right arm.    TECHNIQUE:   CT angiogram of the chest with contrast. 3-D reformatted images were acquired.  Radiation dose reduction techniques included automated exposure control or exposure modulation based on body size. Radiation audit for number of CT and nuclear cardiology  exams performed in the last year:  0. Dose recorded in the patient's chart.    COMPARISON:   Chest x-ray from 8/19/2019.    FINDINGS:   There is no evidence for pulmonary embolism. There is globular cardiac silhouette enlargement with small to moderate pericardial effusion. There is moderate right-sided pleural effusion. There is likely moderate to large left pleural effusion with  pleural fluid seen at the apex.. There is atherosclerotic vascular calcification involving the thoracic aorta  and great vessels. The timing of the contrast bolus because the patient's heart failure does not allow for assessment for thoracic aortic  dissection. The patient has a pacemaker.    There is severe alveolar disease left lung with near complete consolidation of the left lower lobe and considerable consolidation of the left upper lobe. Please correlate for clinical evidence of aspiration or pneumonia. There is patchier airspace  disease in the right lung including the right middle lobe, right lower lobe and right upper lobe. Many of the opacities are nodular and the possibility of metastatic disease or atypical infectious disease should also be considered.. Please correlate with  the patient's history. This study is not sensitive for adenopathy given the amount of pleural and parenchymal disease. There is at least partially calcified subcarinal node up to about 3.2 x 2.2 cm in dimension. Compression fracture seen at the  thoracolumbar junction. There may be a left adrenal mass lesion 3.4 x 2.4 cm. This is partly in the field-of-view.      Impression:       No evidence for pulmonary most.  2. Globular cardiac silhouette enlargement.  3. Bilateral pleural effusions.  4. Extensive alveolar disease asymmetrically worse on the left than the right. In addition to the airspace disease there are multiple bilateral nodular lesions in the lungs. This could be infectious or inflammatory in etiology but metastatic disease is  also in the differential.  5. Partially calcified subcarinal lymph node.  6. No pneumothorax  7. Concern for left adrenal mass partly in the field-of-view.    Signer Name: Rosario Sullivan MD   Signed: 9/4/2019 5:14 AM   Workstation Name: [email or web address]    [facility]          Results for orders placed during the hospital encounter of 08/19/19   Adult Transthoracic Echo Complete With Contrast if Necessary Per Protocol    Narrative · The left ventricular cavity is moderate-to-severely  dilated.  · Left atrial cavity size is moderate-to-severely dilated.  · Moderate aortic valve regurgitation is present.  · Moderate-to-severe mitral valve regurgitation is present.  · Mild tricuspid valve regurgitation is present.  · Mild pulmonic valve regurgitation is present.  · Mild dilation of the aortic root and of the sinuses Valsalva present.  · Left ventricular wall thickness is consistent with mild concentric   hypertrophy.  · Estimated EF = 24%.  · Left ventricular systolic function is severely decreased.  · The following left ventricular wall segments are hypokinetic: basal   anterolateral, basal inferolateral, mid inferolateral, apical inferior,   mid inferior, basal inferoseptal, basal anterior, basal inferior and basal   inferoseptal. The following left ventricular wall segments are akinetic:   mid anterior, apical anterior, mid anterolateral, apical lateral, apical   septal, mid inferoseptal, apex and mid anteroseptal.  · The findings are consistent with dilated cardiomyopathy.  · Normal right ventricular cavity size and wall thickness with moderately   reduced right ventricular systolic function noted; abnormal septal motion   consistent with RV pacemaker.  · The aortic valve exhibits mild sclerosis.  · No evidence of pulmonary hypertension is present.  · There is a trivial pericardial effusion adjacent to the left ventricle.           Order Current Status    Blood Culture - Blood, Arm, Left Preliminary result    Blood Culture - Blood, Arm, Right Preliminary result          Discharge Details        Discharge Medications      New Medications      Instructions Start Date   ipratropium-albuterol 0.5-2.5 mg/3 ml nebulizer  Commonly known as:  DUO-NEB   3 mL, Nebulization, Every 4 Hours PRN         Changes to Medications      Instructions Start Date   furosemide 40 MG tablet  Commonly known as:  LASIX  What changed:  when to take this   40 mg, Oral, 2 Times Daily         Continue These Medications       Instructions Start Date   acetaminophen 325 MG tablet  Commonly known as:  TYLENOL   650 mg, Oral, Every 6 Hours PRN      BISAC-EVAC 10 MG suppository  Generic drug:  bisacodyl   10 mg, Rectal, Daily PRN      carvedilol 12.5 MG tablet  Commonly known as:  COREG   12.5 mg, Oral, 2 Times Daily With Meals      diclofenac 1 % gel gel  Commonly known as:  VOLTAREN   4 g, Topical, 2 Times Daily PRN      donepezil 5 MG tablet  Commonly known as:  ARICEPT   5 mg, Oral, Nightly      ferrous sulfate 325 (65 FE) MG tablet   325 mg, Oral, Daily With Breakfast      folic acid 1 MG tablet  Commonly known as:  FOLVITE   1 mg, Oral, Daily      levothyroxine 50 MCG tablet  Commonly known as:  SYNTHROID, LEVOTHROID   50 mcg, Oral, Daily      lisinopril 2.5 MG tablet  Commonly known as:  PRINIVIL,ZESTRIL   2.5 mg, Oral, Daily, Hold if SBP <110      MILK OF MAGNESIA PO   30 mL, Oral, Daily PRN      mirtazapine 7.5 MG half tablet  Commonly known as:  REMERON   15 mg, Oral, Daily      nitroglycerin 0.4 MG SL tablet  Commonly known as:  NITROSTAT   0.4 mg, Sublingual, Every 5 Minutes PRN, Take no more than 3 doses in 15 minutes.       sertraline 50 MG tablet  Commonly known as:  ZOLOFT   50 mg, Oral, Daily             No Known Allergies      Discharge Disposition:  Skilled Nursing Facility (DC - External)    Discharge Diet:  Diet Order   Procedures   • Diet Soft Texture; [employer] Foods; Thin; Cardiac       Discharge Activity:  Activity Instructions     Activity as Tolerated              CODE STATUS:    Code Status and Medical Interventions:   Ordered at: 09/04/19 0657     Code Status:    No CPR     Medical Interventions (Level of Support Prior to Arrest):    Comfort Measures     Comments:    per advanced directive on chart         Future Appointments   Date Time Provider Department Center   1/16/2020 12:45 PM Les Cardozo MD [facility] None       Additional Instructions for the Follow-ups that You Need to Schedule     Discharge  Follow-up with PCP   As directed       Currently Documented PCP:    Leyla Castro DO    PCP Phone Number:    [phone]     Follow Up Details:  with provider at facility within 1 week                 Time Spent on Discharge:  30 minutes    Electronically signed by ALEX Wills, 09/07/19, 9:33 AM.

## 2019-09-07 NOTE — PROGRESS NOTES
Case Management Discharge Note    Final Note: Per arrangements made through hospice service, patient discharging to [facility] with hospice today via [facility] ambulance service scheduled at 1000.      Destination - Selection Complete      Service Provider Request Status Selected Services Address Phone Number Fax Number    [facility] [address] [facility] [address] [phone]      Durable Medical Equipment      No service has been selected for the patient.      Dialysis/Infusion      No service has been selected for the patient.      Home Medical Care      No service has been selected for the patient.      Therapy      No service has been selected for the patient.      Community Resources      No service has been selected for the patient.             Final Discharge Disposition Code: 03 - skilled nursing facility (SNF)

## 2022-08-23 NOTE — NURSING NOTE
Spoke with Petrona at [facility] to obtain copies of med list as well as advance directive. Systems are currently down and they will send copies when they can. Petrona states the pt is a DNR. Reviewed med list by phone for now.          Fluconazole Pregnancy And Lactation Text: This medication is Pregnancy Category C and it isn't know if it is safe during pregnancy. It is also excreted in breast milk.